# Patient Record
Sex: MALE | Race: WHITE | NOT HISPANIC OR LATINO
[De-identification: names, ages, dates, MRNs, and addresses within clinical notes are randomized per-mention and may not be internally consistent; named-entity substitution may affect disease eponyms.]

---

## 2022-01-01 ENCOUNTER — TRANSCRIPTION ENCOUNTER (OUTPATIENT)
Age: 87
End: 2022-01-01

## 2022-01-01 ENCOUNTER — INPATIENT (INPATIENT)
Facility: HOSPITAL | Age: 87
LOS: 2 days | DRG: 951 | End: 2022-10-02
Attending: STUDENT IN AN ORGANIZED HEALTH CARE EDUCATION/TRAINING PROGRAM | Admitting: HOSPITALIST
Payer: MEDICARE

## 2022-01-01 ENCOUNTER — APPOINTMENT (OUTPATIENT)
Dept: UROLOGY | Facility: CLINIC | Age: 87
End: 2022-01-01

## 2022-01-01 ENCOUNTER — INPATIENT (INPATIENT)
Facility: HOSPITAL | Age: 87
LOS: 7 days | Discharge: NURSING, REHAB OR LONG-TERM CARE | DRG: 871 | End: 2022-08-25
Attending: INTERNAL MEDICINE
Payer: MEDICARE

## 2022-01-01 ENCOUNTER — EMERGENCY (EMERGENCY)
Facility: HOSPITAL | Age: 87
LOS: 1 days | Discharge: HOME | End: 2022-01-01
Attending: EMERGENCY MEDICINE | Admitting: EMERGENCY MEDICINE
Payer: MEDICARE

## 2022-01-01 ENCOUNTER — INPATIENT (INPATIENT)
Facility: HOSPITAL | Age: 87
LOS: 1 days | Discharge: NURSING, REHAB OR LONG-TERM CARE | DRG: 378 | End: 2022-09-12
Attending: INTERNAL MEDICINE | Admitting: INTERNAL MEDICINE
Payer: MEDICARE

## 2022-01-01 VITALS
OXYGEN SATURATION: 98 % | HEART RATE: 80 BPM | TEMPERATURE: 98.3 F | SYSTOLIC BLOOD PRESSURE: 97 MMHG | DIASTOLIC BLOOD PRESSURE: 66 MMHG

## 2022-01-01 VITALS
TEMPERATURE: 98 F | OXYGEN SATURATION: 97 % | HEIGHT: 71 IN | RESPIRATION RATE: 16 BRPM | HEART RATE: 95 BPM | DIASTOLIC BLOOD PRESSURE: 60 MMHG | SYSTOLIC BLOOD PRESSURE: 108 MMHG

## 2022-01-01 VITALS
DIASTOLIC BLOOD PRESSURE: 61 MMHG | TEMPERATURE: 98 F | RESPIRATION RATE: 18 BRPM | SYSTOLIC BLOOD PRESSURE: 107 MMHG | HEART RATE: 83 BPM | OXYGEN SATURATION: 97 %

## 2022-01-01 VITALS
SYSTOLIC BLOOD PRESSURE: 113 MMHG | TEMPERATURE: 98 F | RESPIRATION RATE: 18 BRPM | DIASTOLIC BLOOD PRESSURE: 55 MMHG | HEIGHT: 71 IN | HEART RATE: 73 BPM | OXYGEN SATURATION: 97 % | WEIGHT: 199.96 LBS

## 2022-01-01 VITALS
DIASTOLIC BLOOD PRESSURE: 64 MMHG | OXYGEN SATURATION: 96 % | HEART RATE: 93 BPM | RESPIRATION RATE: 22 BRPM | WEIGHT: 149.91 LBS | SYSTOLIC BLOOD PRESSURE: 101 MMHG | TEMPERATURE: 98 F

## 2022-01-01 VITALS
SYSTOLIC BLOOD PRESSURE: 127 MMHG | DIASTOLIC BLOOD PRESSURE: 69 MMHG | HEART RATE: 85 BPM | RESPIRATION RATE: 17 BRPM | TEMPERATURE: 98 F | OXYGEN SATURATION: 97 %

## 2022-01-01 VITALS
RESPIRATION RATE: 18 BRPM | HEIGHT: 71 IN | SYSTOLIC BLOOD PRESSURE: 83 MMHG | OXYGEN SATURATION: 85 % | WEIGHT: 149.91 LBS | TEMPERATURE: 98 F | HEART RATE: 80 BPM | DIASTOLIC BLOOD PRESSURE: 52 MMHG

## 2022-01-01 VITALS
DIASTOLIC BLOOD PRESSURE: 44 MMHG | OXYGEN SATURATION: 96 % | SYSTOLIC BLOOD PRESSURE: 70 MMHG | HEART RATE: 67 BPM | TEMPERATURE: 99 F | RESPIRATION RATE: 16 BRPM

## 2022-01-01 VITALS
SYSTOLIC BLOOD PRESSURE: 111 MMHG | RESPIRATION RATE: 18 BRPM | DIASTOLIC BLOOD PRESSURE: 67 MMHG | OXYGEN SATURATION: 95 % | TEMPERATURE: 98 F | HEART RATE: 65 BPM

## 2022-01-01 DIAGNOSIS — Z66 DO NOT RESUSCITATE: ICD-10-CM

## 2022-01-01 DIAGNOSIS — N18.9 CHRONIC KIDNEY DISEASE, UNSPECIFIED: ICD-10-CM

## 2022-01-01 DIAGNOSIS — J98.11 ATELECTASIS: ICD-10-CM

## 2022-01-01 DIAGNOSIS — Z51.5 ENCOUNTER FOR PALLIATIVE CARE: ICD-10-CM

## 2022-01-01 DIAGNOSIS — E78.5 HYPERLIPIDEMIA, UNSPECIFIED: ICD-10-CM

## 2022-01-01 DIAGNOSIS — I42.5 OTHER RESTRICTIVE CARDIOMYOPATHY: ICD-10-CM

## 2022-01-01 DIAGNOSIS — G93.49 OTHER ENCEPHALOPATHY: ICD-10-CM

## 2022-01-01 DIAGNOSIS — F03.90 UNSPECIFIED DEMENTIA WITHOUT BEHAVIORAL DISTURBANCE: ICD-10-CM

## 2022-01-01 DIAGNOSIS — F32.9 MAJOR DEPRESSIVE DISORDER, SINGLE EPISODE, UNSPECIFIED: ICD-10-CM

## 2022-01-01 DIAGNOSIS — R52 PAIN, UNSPECIFIED: ICD-10-CM

## 2022-01-01 DIAGNOSIS — I50.9 HEART FAILURE, UNSPECIFIED: ICD-10-CM

## 2022-01-01 DIAGNOSIS — N40.1 BENIGN PROSTATIC HYPERPLASIA WITH LOWER URINARY TRACT SYMPTOMS: ICD-10-CM

## 2022-01-01 DIAGNOSIS — H91.90 UNSPECIFIED HEARING LOSS, UNSPECIFIED EAR: ICD-10-CM

## 2022-01-01 DIAGNOSIS — Z29.9 ENCOUNTER FOR PROPHYLACTIC MEASURES, UNSPECIFIED: ICD-10-CM

## 2022-01-01 DIAGNOSIS — Z20.822 CONTACT WITH AND (SUSPECTED) EXPOSURE TO COVID-19: ICD-10-CM

## 2022-01-01 DIAGNOSIS — I13.0 HYPERTENSIVE HEART AND CHRONIC KIDNEY DISEASE WITH HEART FAILURE AND STAGE 1 THROUGH STAGE 4 CHRONIC KIDNEY DISEASE, OR UNSPECIFIED CHRONIC KIDNEY DISEASE: ICD-10-CM

## 2022-01-01 DIAGNOSIS — I10 ESSENTIAL (PRIMARY) HYPERTENSION: ICD-10-CM

## 2022-01-01 DIAGNOSIS — I48.20 CHRONIC ATRIAL FIBRILLATION, UNSPECIFIED: ICD-10-CM

## 2022-01-01 DIAGNOSIS — I48.91 UNSPECIFIED ATRIAL FIBRILLATION: ICD-10-CM

## 2022-01-01 DIAGNOSIS — K92.2 GASTROINTESTINAL HEMORRHAGE, UNSPECIFIED: ICD-10-CM

## 2022-01-01 DIAGNOSIS — G93.40 ENCEPHALOPATHY, UNSPECIFIED: ICD-10-CM

## 2022-01-01 DIAGNOSIS — A41.9 SEPSIS, UNSPECIFIED ORGANISM: ICD-10-CM

## 2022-01-01 DIAGNOSIS — R45.1 RESTLESSNESS AND AGITATION: ICD-10-CM

## 2022-01-01 DIAGNOSIS — Z79.01 LONG TERM (CURRENT) USE OF ANTICOAGULANTS: ICD-10-CM

## 2022-01-01 DIAGNOSIS — R79.89 OTHER SPECIFIED ABNORMAL FINDINGS OF BLOOD CHEMISTRY: ICD-10-CM

## 2022-01-01 DIAGNOSIS — Z71.89 OTHER SPECIFIED COUNSELING: ICD-10-CM

## 2022-01-01 DIAGNOSIS — F03.91 UNSPECIFIED DEMENTIA WITH BEHAVIORAL DISTURBANCE: ICD-10-CM

## 2022-01-01 DIAGNOSIS — N40.0 BENIGN PROSTATIC HYPERPLASIA WITHOUT LOWER URINARY TRACT SYMPTOMS: ICD-10-CM

## 2022-01-01 DIAGNOSIS — Z86.73 PERSONAL HISTORY OF TRANSIENT ISCHEMIC ATTACK (TIA), AND CEREBRAL INFARCTION WITHOUT RESIDUAL DEFICITS: ICD-10-CM

## 2022-01-01 DIAGNOSIS — D64.9 ANEMIA, UNSPECIFIED: ICD-10-CM

## 2022-01-01 DIAGNOSIS — J69.0 PNEUMONITIS DUE TO INHALATION OF FOOD AND VOMIT: ICD-10-CM

## 2022-01-01 DIAGNOSIS — G93.41 METABOLIC ENCEPHALOPATHY: ICD-10-CM

## 2022-01-01 DIAGNOSIS — I50.22 CHRONIC SYSTOLIC (CONGESTIVE) HEART FAILURE: ICD-10-CM

## 2022-01-01 DIAGNOSIS — I50.30 UNSPECIFIED DIASTOLIC (CONGESTIVE) HEART FAILURE: ICD-10-CM

## 2022-01-01 DIAGNOSIS — L89.95 PRESSURE ULCER OF UNSPECIFIED SITE, UNSTAGEABLE: ICD-10-CM

## 2022-01-01 DIAGNOSIS — E87.2 ACIDOSIS: ICD-10-CM

## 2022-01-01 DIAGNOSIS — I95.9 HYPOTENSION, UNSPECIFIED: ICD-10-CM

## 2022-01-01 DIAGNOSIS — N17.9 ACUTE KIDNEY FAILURE, UNSPECIFIED: ICD-10-CM

## 2022-01-01 DIAGNOSIS — Z53.29 PROCEDURE AND TREATMENT NOT CARRIED OUT BECAUSE OF PATIENT'S DECISION FOR OTHER REASONS: ICD-10-CM

## 2022-01-01 DIAGNOSIS — N39.0 URINARY TRACT INFECTION, SITE NOT SPECIFIED: ICD-10-CM

## 2022-01-01 DIAGNOSIS — R41.82 ALTERED MENTAL STATUS, UNSPECIFIED: ICD-10-CM

## 2022-01-01 DIAGNOSIS — E87.1 HYPO-OSMOLALITY AND HYPONATREMIA: ICD-10-CM

## 2022-01-01 DIAGNOSIS — E86.1 HYPOVOLEMIA: ICD-10-CM

## 2022-01-01 DIAGNOSIS — L89.616 PRESSURE-INDUCED DEEP TISSUE DAMAGE OF RIGHT HEEL: ICD-10-CM

## 2022-01-01 DIAGNOSIS — R33.9 RETENTION OF URINE, UNSPECIFIED: ICD-10-CM

## 2022-01-01 DIAGNOSIS — Z85.51 PERSONAL HISTORY OF MALIGNANT NEOPLASM OF BLADDER: ICD-10-CM

## 2022-01-01 DIAGNOSIS — R13.10 DYSPHAGIA, UNSPECIFIED: ICD-10-CM

## 2022-01-01 DIAGNOSIS — R11.2 NAUSEA WITH VOMITING, UNSPECIFIED: ICD-10-CM

## 2022-01-01 DIAGNOSIS — R60.0 LOCALIZED EDEMA: ICD-10-CM

## 2022-01-01 DIAGNOSIS — I44.0 ATRIOVENTRICULAR BLOCK, FIRST DEGREE: ICD-10-CM

## 2022-01-01 DIAGNOSIS — S31.000A UNSPECIFIED OPEN WOUND OF LOWER BACK AND PELVIS WITHOUT PENETRATION INTO RETROPERITONEUM, INITIAL ENCOUNTER: ICD-10-CM

## 2022-01-01 DIAGNOSIS — R53.81 OTHER MALAISE: ICD-10-CM

## 2022-01-01 DIAGNOSIS — R63.8 OTHER SYMPTOMS AND SIGNS CONCERNING FOOD AND FLUID INTAKE: ICD-10-CM

## 2022-01-01 DIAGNOSIS — L03.113 CELLULITIS OF RIGHT UPPER LIMB: ICD-10-CM

## 2022-01-01 DIAGNOSIS — R33.8 OTHER RETENTION OF URINE: ICD-10-CM

## 2022-01-01 DIAGNOSIS — R53.2 FUNCTIONAL QUADRIPLEGIA: ICD-10-CM

## 2022-01-01 DIAGNOSIS — Q61.02 CONGENITAL MULTIPLE RENAL CYSTS: ICD-10-CM

## 2022-01-01 DIAGNOSIS — L89.156 PRESSURE-INDUCED DEEP TISSUE DAMAGE OF SACRAL REGION: ICD-10-CM

## 2022-01-01 DIAGNOSIS — F03.90 UNSPECIFIED DEMENTIA, UNSPECIFIED SEVERITY, WITHOUT BEHAVIORAL DISTURBANCE, PSYCHOTIC DISTURBANCE, MOOD DISTURBANCE, AND ANXIETY: ICD-10-CM

## 2022-01-01 DIAGNOSIS — L89.626 PRESSURE-INDUCED DEEP TISSUE DAMAGE OF LEFT HEEL: ICD-10-CM

## 2022-01-01 DIAGNOSIS — N17.0 ACUTE KIDNEY FAILURE WITH TUBULAR NECROSIS: ICD-10-CM

## 2022-01-01 LAB
ALBUMIN SERPL ELPH-MCNC: 1.6 G/DL — LOW (ref 3.3–5)
ALBUMIN SERPL ELPH-MCNC: 1.9 G/DL — LOW (ref 3.3–5)
ALBUMIN SERPL ELPH-MCNC: 2 G/DL — LOW (ref 3.3–5)
ALBUMIN SERPL ELPH-MCNC: 2.1 G/DL — LOW (ref 3.3–5)
ALBUMIN SERPL ELPH-MCNC: 2.2 G/DL — LOW (ref 3.3–5)
ALBUMIN SERPL ELPH-MCNC: 2.3 G/DL — LOW (ref 3.3–5)
ALBUMIN SERPL ELPH-MCNC: 2.4 G/DL — LOW (ref 3.3–5)
ALBUMIN SERPL ELPH-MCNC: 2.6 G/DL — LOW (ref 3.3–5)
ALBUMIN SERPL ELPH-MCNC: 2.7 G/DL — LOW (ref 3.3–5)
ALBUMIN SERPL ELPH-MCNC: 3 G/DL — LOW (ref 3.3–5)
ALP SERPL-CCNC: 29 U/L — LOW (ref 40–120)
ALP SERPL-CCNC: 30 U/L — LOW (ref 40–120)
ALP SERPL-CCNC: 33 U/L — LOW (ref 40–120)
ALP SERPL-CCNC: 34 U/L — LOW (ref 40–120)
ALP SERPL-CCNC: 34 U/L — LOW (ref 40–120)
ALP SERPL-CCNC: 35 U/L — LOW (ref 40–120)
ALP SERPL-CCNC: 36 U/L — LOW (ref 40–120)
ALP SERPL-CCNC: 44 U/L — SIGNIFICANT CHANGE UP (ref 40–120)
ALP SERPL-CCNC: 45 U/L — SIGNIFICANT CHANGE UP (ref 40–120)
ALP SERPL-CCNC: 49 U/L — SIGNIFICANT CHANGE UP (ref 40–120)
ALP SERPL-CCNC: 53 U/L — SIGNIFICANT CHANGE UP (ref 40–120)
ALP SERPL-CCNC: 58 U/L — SIGNIFICANT CHANGE UP (ref 40–120)
ALP SERPL-CCNC: 67 U/L — SIGNIFICANT CHANGE UP (ref 40–120)
ALP SERPL-CCNC: 82 U/L — SIGNIFICANT CHANGE UP (ref 40–120)
ALT FLD-CCNC: 23 U/L — SIGNIFICANT CHANGE UP (ref 10–45)
ALT FLD-CCNC: 24 U/L — SIGNIFICANT CHANGE UP (ref 10–45)
ALT FLD-CCNC: 25 U/L — SIGNIFICANT CHANGE UP (ref 10–45)
ALT FLD-CCNC: 27 U/L — SIGNIFICANT CHANGE UP (ref 10–45)
ALT FLD-CCNC: 28 U/L — SIGNIFICANT CHANGE UP (ref 10–45)
ALT FLD-CCNC: 29 U/L — SIGNIFICANT CHANGE UP (ref 10–45)
ALT FLD-CCNC: 37 U/L — SIGNIFICANT CHANGE UP (ref 10–45)
ALT FLD-CCNC: 38 U/L — SIGNIFICANT CHANGE UP (ref 10–45)
ALT FLD-CCNC: 46 U/L — HIGH (ref 10–45)
ANION GAP SERPL CALC-SCNC: 10 MMOL/L — SIGNIFICANT CHANGE UP (ref 5–17)
ANION GAP SERPL CALC-SCNC: 11 MMOL/L — SIGNIFICANT CHANGE UP (ref 5–17)
ANION GAP SERPL CALC-SCNC: 11 MMOL/L — SIGNIFICANT CHANGE UP (ref 5–17)
ANION GAP SERPL CALC-SCNC: 12 MMOL/L — SIGNIFICANT CHANGE UP (ref 5–17)
ANION GAP SERPL CALC-SCNC: 13 MMOL/L — SIGNIFICANT CHANGE UP (ref 5–17)
ANION GAP SERPL CALC-SCNC: 14 MMOL/L — SIGNIFICANT CHANGE UP (ref 5–17)
ANION GAP SERPL CALC-SCNC: 14 MMOL/L — SIGNIFICANT CHANGE UP (ref 5–17)
ANION GAP SERPL CALC-SCNC: 15 MMOL/L — SIGNIFICANT CHANGE UP (ref 5–17)
ANION GAP SERPL CALC-SCNC: 15 MMOL/L — SIGNIFICANT CHANGE UP (ref 5–17)
ANION GAP SERPL CALC-SCNC: 18 MMOL/L — HIGH (ref 5–17)
ANION GAP SERPL CALC-SCNC: 18 MMOL/L — HIGH (ref 5–17)
ANION GAP SERPL CALC-SCNC: 19 MMOL/L — HIGH (ref 5–17)
ANISOCYTOSIS BLD QL: SIGNIFICANT CHANGE UP
ANISOCYTOSIS BLD QL: SLIGHT — SIGNIFICANT CHANGE UP
APPEARANCE UR: CLEAR — SIGNIFICANT CHANGE UP
APTT BLD: 24.3 SEC — LOW (ref 27.5–35.5)
APTT BLD: 27.5 SEC — SIGNIFICANT CHANGE UP (ref 27.5–35.5)
APTT BLD: 28.6 SEC — SIGNIFICANT CHANGE UP (ref 27.5–35.5)
APTT BLD: 31.3 SEC — SIGNIFICANT CHANGE UP (ref 27.5–35.5)
AST SERPL-CCNC: 37 U/L — SIGNIFICANT CHANGE UP (ref 10–40)
AST SERPL-CCNC: 41 U/L — HIGH (ref 10–40)
AST SERPL-CCNC: 48 U/L — HIGH (ref 10–40)
AST SERPL-CCNC: 48 U/L — HIGH (ref 10–40)
AST SERPL-CCNC: 53 U/L — HIGH (ref 10–40)
AST SERPL-CCNC: 53 U/L — HIGH (ref 10–40)
AST SERPL-CCNC: 56 U/L — HIGH (ref 10–40)
AST SERPL-CCNC: 57 U/L — HIGH (ref 10–40)
AST SERPL-CCNC: 60 U/L — HIGH (ref 10–40)
AST SERPL-CCNC: 60 U/L — HIGH (ref 10–40)
AST SERPL-CCNC: 65 U/L — HIGH (ref 10–40)
AST SERPL-CCNC: 70 U/L — HIGH (ref 10–40)
AST SERPL-CCNC: 75 U/L — HIGH (ref 10–40)
AST SERPL-CCNC: 78 U/L — HIGH (ref 10–40)
BACTERIA # UR AUTO: PRESENT /HPF
BASE EXCESS BLDV CALC-SCNC: -1.1 MMOL/L — SIGNIFICANT CHANGE UP (ref -2–3)
BASE EXCESS BLDV CALC-SCNC: -3.3 MMOL/L — LOW (ref -2–3)
BASE EXCESS BLDV CALC-SCNC: -8.8 MMOL/L — LOW (ref -2–3)
BASE EXCESS BLDV CALC-SCNC: -9.6 MMOL/L — LOW (ref -2–3)
BASOPHILS # BLD AUTO: 0 K/UL — SIGNIFICANT CHANGE UP (ref 0–0.2)
BASOPHILS # BLD AUTO: 0.01 K/UL — SIGNIFICANT CHANGE UP (ref 0–0.2)
BASOPHILS # BLD AUTO: 0.02 K/UL — SIGNIFICANT CHANGE UP (ref 0–0.2)
BASOPHILS NFR BLD AUTO: 0 % — SIGNIFICANT CHANGE UP (ref 0–2)
BASOPHILS NFR BLD AUTO: 0.1 % — SIGNIFICANT CHANGE UP (ref 0–2)
BASOPHILS NFR BLD AUTO: 0.2 % — SIGNIFICANT CHANGE UP (ref 0–2)
BASOPHILS NFR BLD AUTO: 0.2 % — SIGNIFICANT CHANGE UP (ref 0–2)
BILIRUB SERPL-MCNC: 0.4 MG/DL — SIGNIFICANT CHANGE UP (ref 0.2–1.2)
BILIRUB SERPL-MCNC: 0.4 MG/DL — SIGNIFICANT CHANGE UP (ref 0.2–1.2)
BILIRUB SERPL-MCNC: 0.5 MG/DL — SIGNIFICANT CHANGE UP (ref 0.2–1.2)
BILIRUB SERPL-MCNC: 0.6 MG/DL — SIGNIFICANT CHANGE UP (ref 0.2–1.2)
BILIRUB SERPL-MCNC: 0.8 MG/DL — SIGNIFICANT CHANGE UP (ref 0.2–1.2)
BILIRUB SERPL-MCNC: 0.9 MG/DL — SIGNIFICANT CHANGE UP (ref 0.2–1.2)
BILIRUB SERPL-MCNC: 1.2 MG/DL — SIGNIFICANT CHANGE UP (ref 0.2–1.2)
BILIRUB SERPL-MCNC: 1.4 MG/DL — HIGH (ref 0.2–1.2)
BILIRUB UR-MCNC: NEGATIVE — SIGNIFICANT CHANGE UP
BLD GP AB SCN SERPL QL: NEGATIVE — SIGNIFICANT CHANGE UP
BUN SERPL-MCNC: 100 MG/DL — HIGH (ref 7–23)
BUN SERPL-MCNC: 102 MG/DL — HIGH (ref 7–23)
BUN SERPL-MCNC: 103 MG/DL — HIGH (ref 7–23)
BUN SERPL-MCNC: 103 MG/DL — HIGH (ref 7–23)
BUN SERPL-MCNC: 117 MG/DL — HIGH (ref 7–23)
BUN SERPL-MCNC: 125 MG/DL — HIGH (ref 7–23)
BUN SERPL-MCNC: 62 MG/DL — HIGH (ref 7–23)
BUN SERPL-MCNC: 75 MG/DL — HIGH (ref 7–23)
BUN SERPL-MCNC: 83 MG/DL — HIGH (ref 7–23)
BUN SERPL-MCNC: 84 MG/DL — HIGH (ref 7–23)
BUN SERPL-MCNC: 94 MG/DL — HIGH (ref 7–23)
BUN SERPL-MCNC: 96 MG/DL — HIGH (ref 7–23)
BUN SERPL-MCNC: 96 MG/DL — HIGH (ref 7–23)
BUN SERPL-MCNC: 98 MG/DL — HIGH (ref 7–23)
BUN SERPL-MCNC: 99 MG/DL — HIGH (ref 7–23)
BURR CELLS BLD QL SMEAR: PRESENT — SIGNIFICANT CHANGE UP
BURR CELLS BLD QL SMEAR: SLIGHT — SIGNIFICANT CHANGE UP
CA-I SERPL-SCNC: 1.14 MMOL/L — LOW (ref 1.15–1.33)
CA-I SERPL-SCNC: 1.17 MMOL/L — SIGNIFICANT CHANGE UP (ref 1.15–1.33)
CA-I SERPL-SCNC: 1.35 MMOL/L — HIGH (ref 1.15–1.33)
CA-I SERPL-SCNC: 1.43 MMOL/L — HIGH (ref 1.15–1.33)
CALCIUM SERPL-MCNC: 10.5 MG/DL — SIGNIFICANT CHANGE UP (ref 8.4–10.5)
CALCIUM SERPL-MCNC: 7.6 MG/DL — LOW (ref 8.4–10.5)
CALCIUM SERPL-MCNC: 8.3 MG/DL — LOW (ref 8.4–10.5)
CALCIUM SERPL-MCNC: 8.5 MG/DL — SIGNIFICANT CHANGE UP (ref 8.4–10.5)
CALCIUM SERPL-MCNC: 8.6 MG/DL — SIGNIFICANT CHANGE UP (ref 8.4–10.5)
CALCIUM SERPL-MCNC: 8.7 MG/DL — SIGNIFICANT CHANGE UP (ref 8.4–10.5)
CALCIUM SERPL-MCNC: 8.8 MG/DL — SIGNIFICANT CHANGE UP (ref 8.4–10.5)
CALCIUM SERPL-MCNC: 8.9 MG/DL — SIGNIFICANT CHANGE UP (ref 8.4–10.5)
CALCIUM SERPL-MCNC: 9 MG/DL — SIGNIFICANT CHANGE UP (ref 8.4–10.5)
CALCIUM SERPL-MCNC: 9.1 MG/DL — SIGNIFICANT CHANGE UP (ref 8.4–10.5)
CALCIUM SERPL-MCNC: 9.2 MG/DL — SIGNIFICANT CHANGE UP (ref 8.4–10.5)
CALCIUM SERPL-MCNC: 9.4 MG/DL — SIGNIFICANT CHANGE UP (ref 8.4–10.5)
CALCIUM SERPL-MCNC: 9.4 MG/DL — SIGNIFICANT CHANGE UP (ref 8.4–10.5)
CHLORIDE SERPL-SCNC: 100 MMOL/L — SIGNIFICANT CHANGE UP (ref 96–108)
CHLORIDE SERPL-SCNC: 101 MMOL/L — SIGNIFICANT CHANGE UP (ref 96–108)
CHLORIDE SERPL-SCNC: 102 MMOL/L — SIGNIFICANT CHANGE UP (ref 96–108)
CHLORIDE SERPL-SCNC: 103 MMOL/L — SIGNIFICANT CHANGE UP (ref 96–108)
CHLORIDE SERPL-SCNC: 104 MMOL/L — SIGNIFICANT CHANGE UP (ref 96–108)
CHLORIDE SERPL-SCNC: 108 MMOL/L — SIGNIFICANT CHANGE UP (ref 96–108)
CHLORIDE SERPL-SCNC: 108 MMOL/L — SIGNIFICANT CHANGE UP (ref 96–108)
CHLORIDE SERPL-SCNC: 109 MMOL/L — HIGH (ref 96–108)
CHLORIDE SERPL-SCNC: 111 MMOL/L — HIGH (ref 96–108)
CHLORIDE SERPL-SCNC: 111 MMOL/L — HIGH (ref 96–108)
CHLORIDE SERPL-SCNC: 112 MMOL/L — HIGH (ref 96–108)
CHLORIDE SERPL-SCNC: 113 MMOL/L — HIGH (ref 96–108)
CHLORIDE SERPL-SCNC: 117 MMOL/L — HIGH (ref 96–108)
CHLORIDE SERPL-SCNC: 122 MMOL/L — HIGH (ref 96–108)
CK MB CFR SERPL CALC: 4 NG/ML — SIGNIFICANT CHANGE UP (ref 0–6.7)
CK MB CFR SERPL CALC: 5 NG/ML — SIGNIFICANT CHANGE UP (ref 0–6.7)
CK SERPL-CCNC: 265 U/L — HIGH (ref 30–200)
CK SERPL-CCNC: 379 U/L — HIGH (ref 30–200)
CO2 BLDV-SCNC: 15.7 MMOL/L — LOW (ref 22–26)
CO2 BLDV-SCNC: 16.5 MMOL/L — LOW (ref 22–26)
CO2 BLDV-SCNC: 20.8 MMOL/L — LOW (ref 22–26)
CO2 BLDV-SCNC: 23.6 MMOL/L — SIGNIFICANT CHANGE UP (ref 22–26)
CO2 SERPL-SCNC: 13 MMOL/L — LOW (ref 22–31)
CO2 SERPL-SCNC: 13 MMOL/L — LOW (ref 22–31)
CO2 SERPL-SCNC: 15 MMOL/L — LOW (ref 22–31)
CO2 SERPL-SCNC: 15 MMOL/L — LOW (ref 22–31)
CO2 SERPL-SCNC: 16 MMOL/L — LOW (ref 22–31)
CO2 SERPL-SCNC: 16 MMOL/L — LOW (ref 22–31)
CO2 SERPL-SCNC: 18 MMOL/L — LOW (ref 22–31)
CO2 SERPL-SCNC: 18 MMOL/L — LOW (ref 22–31)
CO2 SERPL-SCNC: 19 MMOL/L — LOW (ref 22–31)
CO2 SERPL-SCNC: 19 MMOL/L — LOW (ref 22–31)
CO2 SERPL-SCNC: 20 MMOL/L — LOW (ref 22–31)
CO2 SERPL-SCNC: 21 MMOL/L — LOW (ref 22–31)
CO2 SERPL-SCNC: 22 MMOL/L — SIGNIFICANT CHANGE UP (ref 22–31)
CO2 SERPL-SCNC: 25 MMOL/L — SIGNIFICANT CHANGE UP (ref 22–31)
COLOR SPEC: YELLOW — SIGNIFICANT CHANGE UP
CREAT ?TM UR-MCNC: 62 MG/DL — SIGNIFICANT CHANGE UP
CREAT ?TM UR-MCNC: 67 MG/DL — SIGNIFICANT CHANGE UP
CREAT SERPL-MCNC: 2.69 MG/DL — HIGH (ref 0.5–1.3)
CREAT SERPL-MCNC: 3.22 MG/DL — HIGH (ref 0.5–1.3)
CREAT SERPL-MCNC: 3.59 MG/DL — HIGH (ref 0.5–1.3)
CREAT SERPL-MCNC: 3.63 MG/DL — HIGH (ref 0.5–1.3)
CREAT SERPL-MCNC: 3.73 MG/DL — HIGH (ref 0.5–1.3)
CREAT SERPL-MCNC: 3.8 MG/DL — HIGH (ref 0.5–1.3)
CREAT SERPL-MCNC: 3.85 MG/DL — HIGH (ref 0.5–1.3)
CREAT SERPL-MCNC: 3.88 MG/DL — HIGH (ref 0.5–1.3)
CREAT SERPL-MCNC: 3.91 MG/DL — HIGH (ref 0.5–1.3)
CREAT SERPL-MCNC: 3.92 MG/DL — HIGH (ref 0.5–1.3)
CREAT SERPL-MCNC: 4.01 MG/DL — HIGH (ref 0.5–1.3)
CREAT SERPL-MCNC: 4.03 MG/DL — HIGH (ref 0.5–1.3)
CREAT SERPL-MCNC: 4.05 MG/DL — HIGH (ref 0.5–1.3)
CREAT SERPL-MCNC: 4.13 MG/DL — HIGH (ref 0.5–1.3)
CREAT SERPL-MCNC: 4.15 MG/DL — HIGH (ref 0.5–1.3)
CREAT SERPL-MCNC: 4.26 MG/DL — HIGH (ref 0.5–1.3)
CREAT SERPL-MCNC: 4.27 MG/DL — HIGH (ref 0.5–1.3)
CREAT SERPL-MCNC: 4.41 MG/DL — HIGH (ref 0.5–1.3)
CREAT SERPL-MCNC: 5.99 MG/DL — HIGH (ref 0.5–1.3)
CREAT SERPL-MCNC: 6.4 MG/DL — HIGH (ref 0.5–1.3)
CULTURE RESULTS: NO GROWTH — SIGNIFICANT CHANGE UP
CULTURE RESULTS: SIGNIFICANT CHANGE UP
CULTURE RESULTS: SIGNIFICANT CHANGE UP
DIFF PNL FLD: ABNORMAL
EGFR: 12 ML/MIN/1.73M2 — LOW
EGFR: 13 ML/MIN/1.73M2 — LOW
EGFR: 14 ML/MIN/1.73M2 — LOW
EGFR: 15 ML/MIN/1.73M2 — LOW
EGFR: 15 ML/MIN/1.73M2 — LOW
EGFR: 17 ML/MIN/1.73M2 — LOW
EGFR: 21 ML/MIN/1.73M2 — LOW
EGFR: 8 ML/MIN/1.73M2 — LOW
EGFR: 8 ML/MIN/1.73M2 — LOW
EOSINOPHIL # BLD AUTO: 0 K/UL — SIGNIFICANT CHANGE UP (ref 0–0.5)
EOSINOPHIL # BLD AUTO: 0.01 K/UL — SIGNIFICANT CHANGE UP (ref 0–0.5)
EOSINOPHIL # BLD AUTO: 0.02 K/UL — SIGNIFICANT CHANGE UP (ref 0–0.5)
EOSINOPHIL # BLD AUTO: 0.06 K/UL — SIGNIFICANT CHANGE UP (ref 0–0.5)
EOSINOPHIL # BLD AUTO: 0.08 K/UL — SIGNIFICANT CHANGE UP (ref 0–0.5)
EOSINOPHIL # BLD AUTO: 0.13 K/UL — SIGNIFICANT CHANGE UP (ref 0–0.5)
EOSINOPHIL # BLD AUTO: 0.15 K/UL — SIGNIFICANT CHANGE UP (ref 0–0.5)
EOSINOPHIL # BLD AUTO: 0.16 K/UL — SIGNIFICANT CHANGE UP (ref 0–0.5)
EOSINOPHIL # BLD AUTO: 0.17 K/UL — SIGNIFICANT CHANGE UP (ref 0–0.5)
EOSINOPHIL # BLD AUTO: 0.22 K/UL — SIGNIFICANT CHANGE UP (ref 0–0.5)
EOSINOPHIL # BLD AUTO: 0.26 K/UL — SIGNIFICANT CHANGE UP (ref 0–0.5)
EOSINOPHIL # BLD AUTO: 0.34 K/UL — SIGNIFICANT CHANGE UP (ref 0–0.5)
EOSINOPHIL NFR BLD AUTO: 0 % — SIGNIFICANT CHANGE UP (ref 0–6)
EOSINOPHIL NFR BLD AUTO: 0.1 % — SIGNIFICANT CHANGE UP (ref 0–6)
EOSINOPHIL NFR BLD AUTO: 0.1 % — SIGNIFICANT CHANGE UP (ref 0–6)
EOSINOPHIL NFR BLD AUTO: 0.6 % — SIGNIFICANT CHANGE UP (ref 0–6)
EOSINOPHIL NFR BLD AUTO: 0.9 % — SIGNIFICANT CHANGE UP (ref 0–6)
EOSINOPHIL NFR BLD AUTO: 1.7 % — SIGNIFICANT CHANGE UP (ref 0–6)
EOSINOPHIL NFR BLD AUTO: 2 % — SIGNIFICANT CHANGE UP (ref 0–6)
EOSINOPHIL NFR BLD AUTO: 2 % — SIGNIFICANT CHANGE UP (ref 0–6)
EOSINOPHIL NFR BLD AUTO: 2.3 % — SIGNIFICANT CHANGE UP (ref 0–6)
EOSINOPHIL NFR BLD AUTO: 2.5 % — SIGNIFICANT CHANGE UP (ref 0–6)
EOSINOPHIL NFR BLD AUTO: 3.6 % — SIGNIFICANT CHANGE UP (ref 0–6)
EOSINOPHIL NFR BLD AUTO: 4.4 % — SIGNIFICANT CHANGE UP (ref 0–6)
EPI CELLS # UR: SIGNIFICANT CHANGE UP /HPF (ref 0–5)
FERRITIN SERPL-MCNC: 916 NG/ML — HIGH (ref 30–400)
FOLATE SERPL-MCNC: 5.8 NG/ML — SIGNIFICANT CHANGE UP
GAS PNL BLDV: 130 MMOL/L — LOW (ref 136–145)
GAS PNL BLDV: 133 MMOL/L — LOW (ref 136–145)
GAS PNL BLDV: 149 MMOL/L — HIGH (ref 136–145)
GAS PNL BLDV: 149 MMOL/L — HIGH (ref 136–145)
GAS PNL BLDV: SIGNIFICANT CHANGE UP
GIANT PLATELETS BLD QL SMEAR: PRESENT — SIGNIFICANT CHANGE UP
GLUCOSE SERPL-MCNC: 105 MG/DL — HIGH (ref 70–99)
GLUCOSE SERPL-MCNC: 106 MG/DL — HIGH (ref 70–99)
GLUCOSE SERPL-MCNC: 112 MG/DL — HIGH (ref 70–99)
GLUCOSE SERPL-MCNC: 119 MG/DL — HIGH (ref 70–99)
GLUCOSE SERPL-MCNC: 121 MG/DL — HIGH (ref 70–99)
GLUCOSE SERPL-MCNC: 124 MG/DL — HIGH (ref 70–99)
GLUCOSE SERPL-MCNC: 132 MG/DL — HIGH (ref 70–99)
GLUCOSE SERPL-MCNC: 140 MG/DL — HIGH (ref 70–99)
GLUCOSE SERPL-MCNC: 81 MG/DL — SIGNIFICANT CHANGE UP (ref 70–99)
GLUCOSE SERPL-MCNC: 84 MG/DL — SIGNIFICANT CHANGE UP (ref 70–99)
GLUCOSE SERPL-MCNC: 85 MG/DL — SIGNIFICANT CHANGE UP (ref 70–99)
GLUCOSE SERPL-MCNC: 87 MG/DL — SIGNIFICANT CHANGE UP (ref 70–99)
GLUCOSE SERPL-MCNC: 91 MG/DL — SIGNIFICANT CHANGE UP (ref 70–99)
GLUCOSE SERPL-MCNC: 92 MG/DL — SIGNIFICANT CHANGE UP (ref 70–99)
GLUCOSE SERPL-MCNC: 94 MG/DL — SIGNIFICANT CHANGE UP (ref 70–99)
GLUCOSE SERPL-MCNC: 94 MG/DL — SIGNIFICANT CHANGE UP (ref 70–99)
GLUCOSE SERPL-MCNC: 97 MG/DL — SIGNIFICANT CHANGE UP (ref 70–99)
GLUCOSE SERPL-MCNC: 99 MG/DL — SIGNIFICANT CHANGE UP (ref 70–99)
GLUCOSE UR QL: NEGATIVE — SIGNIFICANT CHANGE UP
HCO3 BLDV-SCNC: 15 MMOL/L — LOW (ref 22–29)
HCO3 BLDV-SCNC: 16 MMOL/L — LOW (ref 22–29)
HCO3 BLDV-SCNC: 20 MMOL/L — LOW (ref 22–29)
HCO3 BLDV-SCNC: 23 MMOL/L — SIGNIFICANT CHANGE UP (ref 22–29)
HCT VFR BLD CALC: 19 % — CRITICAL LOW (ref 39–50)
HCT VFR BLD CALC: 21.2 % — LOW (ref 39–50)
HCT VFR BLD CALC: 21.8 % — LOW (ref 39–50)
HCT VFR BLD CALC: 22.1 % — LOW (ref 39–50)
HCT VFR BLD CALC: 22.5 % — LOW (ref 39–50)
HCT VFR BLD CALC: 23.3 % — LOW (ref 39–50)
HCT VFR BLD CALC: 23.5 % — LOW (ref 39–50)
HCT VFR BLD CALC: 23.6 % — LOW (ref 39–50)
HCT VFR BLD CALC: 23.8 % — LOW (ref 39–50)
HCT VFR BLD CALC: 24.2 % — LOW (ref 39–50)
HCT VFR BLD CALC: 27.5 % — LOW (ref 39–50)
HCT VFR BLD CALC: 28 % — LOW (ref 39–50)
HCT VFR BLD CALC: 28.1 % — LOW (ref 39–50)
HCT VFR BLD CALC: 28.2 % — LOW (ref 39–50)
HCT VFR BLD CALC: 28.4 % — LOW (ref 39–50)
HCT VFR BLD CALC: 28.9 % — LOW (ref 39–50)
HCT VFR BLD CALC: 29.6 % — LOW (ref 39–50)
HCT VFR BLD CALC: 30.2 % — LOW (ref 39–50)
HGB BLD-MCNC: 5.9 G/DL — CRITICAL LOW (ref 13–17)
HGB BLD-MCNC: 7.2 G/DL — LOW (ref 13–17)
HGB BLD-MCNC: 7.2 G/DL — LOW (ref 13–17)
HGB BLD-MCNC: 7.3 G/DL — LOW (ref 13–17)
HGB BLD-MCNC: 7.6 G/DL — LOW (ref 13–17)
HGB BLD-MCNC: 7.7 G/DL — LOW (ref 13–17)
HGB BLD-MCNC: 7.8 G/DL — LOW (ref 13–17)
HGB BLD-MCNC: 8.6 G/DL — LOW (ref 13–17)
HGB BLD-MCNC: 8.9 G/DL — LOW (ref 13–17)
HGB BLD-MCNC: 9.1 G/DL — LOW (ref 13–17)
HGB BLD-MCNC: 9.2 G/DL — LOW (ref 13–17)
HGB BLD-MCNC: 9.4 G/DL — LOW (ref 13–17)
HGB BLD-MCNC: 9.5 G/DL — LOW (ref 13–17)
HYPOCHROMIA BLD QL: SLIGHT — SIGNIFICANT CHANGE UP
IMM GRANULOCYTES NFR BLD AUTO: 0.4 % — SIGNIFICANT CHANGE UP (ref 0–1.5)
IMM GRANULOCYTES NFR BLD AUTO: 0.5 % — SIGNIFICANT CHANGE UP (ref 0–1.5)
IMM GRANULOCYTES NFR BLD AUTO: 0.9 % — SIGNIFICANT CHANGE UP (ref 0–1.5)
IMM GRANULOCYTES NFR BLD AUTO: 0.9 % — SIGNIFICANT CHANGE UP (ref 0–1.5)
IMM GRANULOCYTES NFR BLD AUTO: 1 % — SIGNIFICANT CHANGE UP (ref 0–1.5)
IMM GRANULOCYTES NFR BLD AUTO: 1 % — SIGNIFICANT CHANGE UP (ref 0–1.5)
IMM GRANULOCYTES NFR BLD AUTO: 1.1 % — HIGH (ref 0–0.9)
IMM GRANULOCYTES NFR BLD AUTO: 1.2 % — SIGNIFICANT CHANGE UP (ref 0–1.5)
IMM GRANULOCYTES NFR BLD AUTO: 1.2 % — SIGNIFICANT CHANGE UP (ref 0–1.5)
IMM GRANULOCYTES NFR BLD AUTO: 1.3 % — SIGNIFICANT CHANGE UP (ref 0–1.5)
IMM GRANULOCYTES NFR BLD AUTO: 1.3 % — SIGNIFICANT CHANGE UP (ref 0–1.5)
IMM GRANULOCYTES NFR BLD AUTO: 1.6 % — HIGH (ref 0–1.5)
IMM GRANULOCYTES NFR BLD AUTO: 1.8 % — HIGH (ref 0–1.5)
INR BLD: 1.18 — HIGH (ref 0.88–1.16)
INR BLD: 1.19 — HIGH (ref 0.88–1.16)
INR BLD: 1.42 — HIGH (ref 0.88–1.16)
INR BLD: 1.43 — HIGH (ref 0.88–1.16)
IRON SATN MFR SERPL: 32 % — SIGNIFICANT CHANGE UP (ref 16–55)
IRON SATN MFR SERPL: 49 UG/DL — SIGNIFICANT CHANGE UP (ref 45–165)
KETONES UR-MCNC: NEGATIVE — SIGNIFICANT CHANGE UP
LACTATE SERPL-SCNC: 1.1 MMOL/L — SIGNIFICANT CHANGE UP (ref 0.5–2)
LACTATE SERPL-SCNC: 1.8 MMOL/L — SIGNIFICANT CHANGE UP (ref 0.5–2)
LACTATE SERPL-SCNC: 2.3 MMOL/L — HIGH (ref 0.5–2)
LEUKOCYTE ESTERASE UR-ACNC: NEGATIVE — SIGNIFICANT CHANGE UP
LIDOCAIN IGE QN: 49 U/L — SIGNIFICANT CHANGE UP (ref 7–60)
LIDOCAIN IGE QN: 54 U/L — SIGNIFICANT CHANGE UP (ref 7–60)
LYMPHOCYTES # BLD AUTO: 0.19 K/UL — LOW (ref 1–3.3)
LYMPHOCYTES # BLD AUTO: 0.62 K/UL — LOW (ref 1–3.3)
LYMPHOCYTES # BLD AUTO: 0.77 K/UL — LOW (ref 1–3.3)
LYMPHOCYTES # BLD AUTO: 1.05 K/UL — SIGNIFICANT CHANGE UP (ref 1–3.3)
LYMPHOCYTES # BLD AUTO: 1.13 K/UL — SIGNIFICANT CHANGE UP (ref 1–3.3)
LYMPHOCYTES # BLD AUTO: 1.19 K/UL — SIGNIFICANT CHANGE UP (ref 1–3.3)
LYMPHOCYTES # BLD AUTO: 1.25 K/UL — SIGNIFICANT CHANGE UP (ref 1–3.3)
LYMPHOCYTES # BLD AUTO: 1.28 K/UL — SIGNIFICANT CHANGE UP (ref 1–3.3)
LYMPHOCYTES # BLD AUTO: 1.33 K/UL — SIGNIFICANT CHANGE UP (ref 1–3.3)
LYMPHOCYTES # BLD AUTO: 1.41 K/UL — SIGNIFICANT CHANGE UP (ref 1–3.3)
LYMPHOCYTES # BLD AUTO: 1.42 K/UL — SIGNIFICANT CHANGE UP (ref 1–3.3)
LYMPHOCYTES # BLD AUTO: 1.46 K/UL — SIGNIFICANT CHANGE UP (ref 1–3.3)
LYMPHOCYTES # BLD AUTO: 1.5 K/UL — SIGNIFICANT CHANGE UP (ref 1–3.3)
LYMPHOCYTES # BLD AUTO: 1.6 K/UL — SIGNIFICANT CHANGE UP (ref 1–3.3)
LYMPHOCYTES # BLD AUTO: 1.7 % — LOW (ref 13–44)
LYMPHOCYTES # BLD AUTO: 1.7 K/UL — SIGNIFICANT CHANGE UP (ref 1–3.3)
LYMPHOCYTES # BLD AUTO: 10.2 % — LOW (ref 13–44)
LYMPHOCYTES # BLD AUTO: 13.8 % — SIGNIFICANT CHANGE UP (ref 13–44)
LYMPHOCYTES # BLD AUTO: 15.4 % — SIGNIFICANT CHANGE UP (ref 13–44)
LYMPHOCYTES # BLD AUTO: 15.7 % — SIGNIFICANT CHANGE UP (ref 13–44)
LYMPHOCYTES # BLD AUTO: 16.8 % — SIGNIFICANT CHANGE UP (ref 13–44)
LYMPHOCYTES # BLD AUTO: 17 % — SIGNIFICANT CHANGE UP (ref 13–44)
LYMPHOCYTES # BLD AUTO: 17.3 % — SIGNIFICANT CHANGE UP (ref 13–44)
LYMPHOCYTES # BLD AUTO: 17.4 % — SIGNIFICANT CHANGE UP (ref 13–44)
LYMPHOCYTES # BLD AUTO: 21.9 % — SIGNIFICANT CHANGE UP (ref 13–44)
LYMPHOCYTES # BLD AUTO: 22.1 % — SIGNIFICANT CHANGE UP (ref 13–44)
LYMPHOCYTES # BLD AUTO: 4.8 % — LOW (ref 13–44)
LYMPHOCYTES # BLD AUTO: 5.2 % — LOW (ref 13–44)
LYMPHOCYTES # BLD AUTO: 8 % — LOW (ref 13–44)
LYMPHOCYTES # BLD AUTO: 8.5 % — LOW (ref 13–44)
MACROCYTES BLD QL: SIGNIFICANT CHANGE UP
MACROCYTES BLD QL: SLIGHT — SIGNIFICANT CHANGE UP
MAGNESIUM SERPL-MCNC: 2.2 MG/DL — SIGNIFICANT CHANGE UP (ref 1.6–2.6)
MAGNESIUM SERPL-MCNC: 2.3 MG/DL — SIGNIFICANT CHANGE UP (ref 1.6–2.6)
MAGNESIUM SERPL-MCNC: 2.4 MG/DL — SIGNIFICANT CHANGE UP (ref 1.6–2.6)
MAGNESIUM SERPL-MCNC: 2.6 MG/DL — SIGNIFICANT CHANGE UP (ref 1.6–2.6)
MAGNESIUM SERPL-MCNC: 2.8 MG/DL — HIGH (ref 1.6–2.6)
MANUAL SMEAR VERIFICATION: SIGNIFICANT CHANGE UP
MANUAL SMEAR VERIFICATION: SIGNIFICANT CHANGE UP
MCHC RBC-ENTMCNC: 30.5 PG — SIGNIFICANT CHANGE UP (ref 27–34)
MCHC RBC-ENTMCNC: 30.6 GM/DL — LOW (ref 32–36)
MCHC RBC-ENTMCNC: 30.6 PG — SIGNIFICANT CHANGE UP (ref 27–34)
MCHC RBC-ENTMCNC: 30.7 PG — SIGNIFICANT CHANGE UP (ref 27–34)
MCHC RBC-ENTMCNC: 30.8 PG — SIGNIFICANT CHANGE UP (ref 27–34)
MCHC RBC-ENTMCNC: 30.9 PG — SIGNIFICANT CHANGE UP (ref 27–34)
MCHC RBC-ENTMCNC: 31 PG — SIGNIFICANT CHANGE UP (ref 27–34)
MCHC RBC-ENTMCNC: 31.1 GM/DL — LOW (ref 32–36)
MCHC RBC-ENTMCNC: 31.1 GM/DL — LOW (ref 32–36)
MCHC RBC-ENTMCNC: 31.1 PG — SIGNIFICANT CHANGE UP (ref 27–34)
MCHC RBC-ENTMCNC: 31.2 PG — SIGNIFICANT CHANGE UP (ref 27–34)
MCHC RBC-ENTMCNC: 31.2 PG — SIGNIFICANT CHANGE UP (ref 27–34)
MCHC RBC-ENTMCNC: 31.4 PG — SIGNIFICANT CHANGE UP (ref 27–34)
MCHC RBC-ENTMCNC: 31.6 PG — SIGNIFICANT CHANGE UP (ref 27–34)
MCHC RBC-ENTMCNC: 31.8 GM/DL — LOW (ref 32–36)
MCHC RBC-ENTMCNC: 31.8 GM/DL — LOW (ref 32–36)
MCHC RBC-ENTMCNC: 31.8 PG — SIGNIFICANT CHANGE UP (ref 27–34)
MCHC RBC-ENTMCNC: 31.9 GM/DL — LOW (ref 32–36)
MCHC RBC-ENTMCNC: 31.9 PG — SIGNIFICANT CHANGE UP (ref 27–34)
MCHC RBC-ENTMCNC: 31.9 PG — SIGNIFICANT CHANGE UP (ref 27–34)
MCHC RBC-ENTMCNC: 32.1 GM/DL — SIGNIFICANT CHANGE UP (ref 32–36)
MCHC RBC-ENTMCNC: 32.1 PG — SIGNIFICANT CHANGE UP (ref 27–34)
MCHC RBC-ENTMCNC: 32.2 GM/DL — SIGNIFICANT CHANGE UP (ref 32–36)
MCHC RBC-ENTMCNC: 32.2 PG — SIGNIFICANT CHANGE UP (ref 27–34)
MCHC RBC-ENTMCNC: 32.2 PG — SIGNIFICANT CHANGE UP (ref 27–34)
MCHC RBC-ENTMCNC: 32.5 GM/DL — SIGNIFICANT CHANGE UP (ref 32–36)
MCHC RBC-ENTMCNC: 32.6 GM/DL — SIGNIFICANT CHANGE UP (ref 32–36)
MCHC RBC-ENTMCNC: 33 PG — SIGNIFICANT CHANGE UP (ref 27–34)
MCHC RBC-ENTMCNC: 33.1 GM/DL — SIGNIFICANT CHANGE UP (ref 32–36)
MCHC RBC-ENTMCNC: 33.1 GM/DL — SIGNIFICANT CHANGE UP (ref 32–36)
MCHC RBC-ENTMCNC: 33.2 GM/DL — SIGNIFICANT CHANGE UP (ref 32–36)
MCHC RBC-ENTMCNC: 33.5 GM/DL — SIGNIFICANT CHANGE UP (ref 32–36)
MCHC RBC-ENTMCNC: 33.8 GM/DL — SIGNIFICANT CHANGE UP (ref 32–36)
MCHC RBC-ENTMCNC: 34 GM/DL — SIGNIFICANT CHANGE UP (ref 32–36)
MCV RBC AUTO: 100.3 FL — HIGH (ref 80–100)
MCV RBC AUTO: 100.4 FL — HIGH (ref 80–100)
MCV RBC AUTO: 101.8 FL — HIGH (ref 80–100)
MCV RBC AUTO: 103.1 FL — HIGH (ref 80–100)
MCV RBC AUTO: 106.1 FL — HIGH (ref 80–100)
MCV RBC AUTO: 91.6 FL — SIGNIFICANT CHANGE UP (ref 80–100)
MCV RBC AUTO: 91.8 FL — SIGNIFICANT CHANGE UP (ref 80–100)
MCV RBC AUTO: 91.8 FL — SIGNIFICANT CHANGE UP (ref 80–100)
MCV RBC AUTO: 92.2 FL — SIGNIFICANT CHANGE UP (ref 80–100)
MCV RBC AUTO: 93.7 FL — SIGNIFICANT CHANGE UP (ref 80–100)
MCV RBC AUTO: 95.9 FL — SIGNIFICANT CHANGE UP (ref 80–100)
MCV RBC AUTO: 96 FL — SIGNIFICANT CHANGE UP (ref 80–100)
MCV RBC AUTO: 96.2 FL — SIGNIFICANT CHANGE UP (ref 80–100)
MCV RBC AUTO: 96.5 FL — SIGNIFICANT CHANGE UP (ref 80–100)
MCV RBC AUTO: 96.8 FL — SIGNIFICANT CHANGE UP (ref 80–100)
MCV RBC AUTO: 96.9 FL — SIGNIFICANT CHANGE UP (ref 80–100)
MCV RBC AUTO: 98 FL — SIGNIFICANT CHANGE UP (ref 80–100)
MCV RBC AUTO: 98.7 FL — SIGNIFICANT CHANGE UP (ref 80–100)
MICROCYTES BLD QL: SLIGHT — SIGNIFICANT CHANGE UP
MONOCYTES # BLD AUTO: 0.38 K/UL — SIGNIFICANT CHANGE UP (ref 0–0.9)
MONOCYTES # BLD AUTO: 0.58 K/UL — SIGNIFICANT CHANGE UP (ref 0–0.9)
MONOCYTES # BLD AUTO: 0.63 K/UL — SIGNIFICANT CHANGE UP (ref 0–0.9)
MONOCYTES # BLD AUTO: 0.67 K/UL — SIGNIFICANT CHANGE UP (ref 0–0.9)
MONOCYTES # BLD AUTO: 0.71 K/UL — SIGNIFICANT CHANGE UP (ref 0–0.9)
MONOCYTES # BLD AUTO: 0.81 K/UL — SIGNIFICANT CHANGE UP (ref 0–0.9)
MONOCYTES # BLD AUTO: 0.83 K/UL — SIGNIFICANT CHANGE UP (ref 0–0.9)
MONOCYTES # BLD AUTO: 0.84 K/UL — SIGNIFICANT CHANGE UP (ref 0–0.9)
MONOCYTES # BLD AUTO: 0.88 K/UL — SIGNIFICANT CHANGE UP (ref 0–0.9)
MONOCYTES # BLD AUTO: 0.88 K/UL — SIGNIFICANT CHANGE UP (ref 0–0.9)
MONOCYTES # BLD AUTO: 0.94 K/UL — HIGH (ref 0–0.9)
MONOCYTES # BLD AUTO: 0.96 K/UL — HIGH (ref 0–0.9)
MONOCYTES # BLD AUTO: 1.06 K/UL — HIGH (ref 0–0.9)
MONOCYTES # BLD AUTO: 1.06 K/UL — HIGH (ref 0–0.9)
MONOCYTES # BLD AUTO: 1.43 K/UL — HIGH (ref 0–0.9)
MONOCYTES NFR BLD AUTO: 10.3 % — SIGNIFICANT CHANGE UP (ref 2–14)
MONOCYTES NFR BLD AUTO: 10.8 % — SIGNIFICANT CHANGE UP (ref 2–14)
MONOCYTES NFR BLD AUTO: 10.9 % — SIGNIFICANT CHANGE UP (ref 2–14)
MONOCYTES NFR BLD AUTO: 10.9 % — SIGNIFICANT CHANGE UP (ref 2–14)
MONOCYTES NFR BLD AUTO: 11.3 % — SIGNIFICANT CHANGE UP (ref 2–14)
MONOCYTES NFR BLD AUTO: 11.6 % — SIGNIFICANT CHANGE UP (ref 2–14)
MONOCYTES NFR BLD AUTO: 13.8 % — SIGNIFICANT CHANGE UP (ref 2–14)
MONOCYTES NFR BLD AUTO: 14 % — SIGNIFICANT CHANGE UP (ref 2–14)
MONOCYTES NFR BLD AUTO: 2.6 % — SIGNIFICANT CHANGE UP (ref 2–14)
MONOCYTES NFR BLD AUTO: 5.2 % — SIGNIFICANT CHANGE UP (ref 2–14)
MONOCYTES NFR BLD AUTO: 5.5 % — SIGNIFICANT CHANGE UP (ref 2–14)
MONOCYTES NFR BLD AUTO: 5.9 % — SIGNIFICANT CHANGE UP (ref 2–14)
MONOCYTES NFR BLD AUTO: 6.1 % — SIGNIFICANT CHANGE UP (ref 2–14)
MONOCYTES NFR BLD AUTO: 6.8 % — SIGNIFICANT CHANGE UP (ref 2–14)
MONOCYTES NFR BLD AUTO: 8.8 % — SIGNIFICANT CHANGE UP (ref 2–14)
MYELOCYTES NFR BLD: 0.8 % — HIGH (ref 0–0)
NEUTROPHILS # BLD AUTO: 10.46 K/UL — HIGH (ref 1.8–7.4)
NEUTROPHILS # BLD AUTO: 10.92 K/UL — HIGH (ref 1.8–7.4)
NEUTROPHILS # BLD AUTO: 11.12 K/UL — HIGH (ref 1.8–7.4)
NEUTROPHILS # BLD AUTO: 11.53 K/UL — HIGH (ref 1.8–7.4)
NEUTROPHILS # BLD AUTO: 12.71 K/UL — HIGH (ref 1.8–7.4)
NEUTROPHILS # BLD AUTO: 13.46 K/UL — HIGH (ref 1.8–7.4)
NEUTROPHILS # BLD AUTO: 4.11 K/UL — SIGNIFICANT CHANGE UP (ref 1.8–7.4)
NEUTROPHILS # BLD AUTO: 4.48 K/UL — SIGNIFICANT CHANGE UP (ref 1.8–7.4)
NEUTROPHILS # BLD AUTO: 4.79 K/UL — SIGNIFICANT CHANGE UP (ref 1.8–7.4)
NEUTROPHILS # BLD AUTO: 5.48 K/UL — SIGNIFICANT CHANGE UP (ref 1.8–7.4)
NEUTROPHILS # BLD AUTO: 5.49 K/UL — SIGNIFICANT CHANGE UP (ref 1.8–7.4)
NEUTROPHILS # BLD AUTO: 5.51 K/UL — SIGNIFICANT CHANGE UP (ref 1.8–7.4)
NEUTROPHILS # BLD AUTO: 6.2 K/UL — SIGNIFICANT CHANGE UP (ref 1.8–7.4)
NEUTROPHILS # BLD AUTO: 6.43 K/UL — SIGNIFICANT CHANGE UP (ref 1.8–7.4)
NEUTROPHILS # BLD AUTO: 7.04 K/UL — SIGNIFICANT CHANGE UP (ref 1.8–7.4)
NEUTROPHILS NFR BLD AUTO: 58.4 % — SIGNIFICANT CHANGE UP (ref 43–77)
NEUTROPHILS NFR BLD AUTO: 59.9 % — SIGNIFICANT CHANGE UP (ref 43–77)
NEUTROPHILS NFR BLD AUTO: 66.2 % — SIGNIFICANT CHANGE UP (ref 43–77)
NEUTROPHILS NFR BLD AUTO: 67.9 % — SIGNIFICANT CHANGE UP (ref 43–77)
NEUTROPHILS NFR BLD AUTO: 68.2 % — SIGNIFICANT CHANGE UP (ref 43–77)
NEUTROPHILS NFR BLD AUTO: 71.8 % — SIGNIFICANT CHANGE UP (ref 43–77)
NEUTROPHILS NFR BLD AUTO: 71.9 % — SIGNIFICANT CHANGE UP (ref 43–77)
NEUTROPHILS NFR BLD AUTO: 72.2 % — SIGNIFICANT CHANGE UP (ref 43–77)
NEUTROPHILS NFR BLD AUTO: 75.8 % — SIGNIFICANT CHANGE UP (ref 43–77)
NEUTROPHILS NFR BLD AUTO: 78.8 % — HIGH (ref 43–77)
NEUTROPHILS NFR BLD AUTO: 84 % — HIGH (ref 43–77)
NEUTROPHILS NFR BLD AUTO: 85 % — HIGH (ref 43–77)
NEUTROPHILS NFR BLD AUTO: 88.6 % — HIGH (ref 43–77)
NEUTROPHILS NFR BLD AUTO: 91.4 % — HIGH (ref 43–77)
NEUTROPHILS NFR BLD AUTO: 93.1 % — HIGH (ref 43–77)
NITRITE UR-MCNC: NEGATIVE — SIGNIFICANT CHANGE UP
NRBC # BLD: 0 /100 WBCS — SIGNIFICANT CHANGE UP (ref 0–0)
NRBC # BLD: 1 /100 WBCS — HIGH (ref 0–0)
NRBC # BLD: 1 /100 — HIGH (ref 0–0)
NRBC # BLD: 2 /100 WBCS — HIGH (ref 0–0)
NRBC # BLD: SIGNIFICANT CHANGE UP /100 WBCS (ref 0–0)
NT-PROBNP SERPL-SCNC: 2189 PG/ML — HIGH (ref 0–300)
OSMOLALITY SERPL: 314 MOSM/KG — HIGH (ref 280–301)
OSMOLALITY UR: 370 MOSM/KG — SIGNIFICANT CHANGE UP (ref 300–900)
OVALOCYTES BLD QL SMEAR: SLIGHT — SIGNIFICANT CHANGE UP
OVALOCYTES BLD QL SMEAR: SLIGHT — SIGNIFICANT CHANGE UP
PCO2 BLDV: 28 MMHG — LOW (ref 42–55)
PCO2 BLDV: 29 MMHG — LOW (ref 42–55)
PCO2 BLDV: 30 MMHG — LOW (ref 42–55)
PCO2 BLDV: 34 MMHG — LOW (ref 42–55)
PH BLDV: 7.33 — SIGNIFICANT CHANGE UP (ref 7.32–7.43)
PH BLDV: 7.34 — SIGNIFICANT CHANGE UP (ref 7.32–7.43)
PH BLDV: 7.43 — SIGNIFICANT CHANGE UP (ref 7.32–7.43)
PH BLDV: 7.43 — SIGNIFICANT CHANGE UP (ref 7.32–7.43)
PH UR: 6 — SIGNIFICANT CHANGE UP (ref 5–8)
PHOSPHATE SERPL-MCNC: 3.2 MG/DL — SIGNIFICANT CHANGE UP (ref 2.5–4.5)
PHOSPHATE SERPL-MCNC: 3.2 MG/DL — SIGNIFICANT CHANGE UP (ref 2.5–4.5)
PHOSPHATE SERPL-MCNC: 3.4 MG/DL — SIGNIFICANT CHANGE UP (ref 2.5–4.5)
PHOSPHATE SERPL-MCNC: 3.7 MG/DL — SIGNIFICANT CHANGE UP (ref 2.5–4.5)
PHOSPHATE SERPL-MCNC: 3.8 MG/DL — SIGNIFICANT CHANGE UP (ref 2.5–4.5)
PHOSPHATE SERPL-MCNC: 3.9 MG/DL — SIGNIFICANT CHANGE UP (ref 2.5–4.5)
PHOSPHATE SERPL-MCNC: 4 MG/DL — SIGNIFICANT CHANGE UP (ref 2.5–4.5)
PHOSPHATE SERPL-MCNC: 4.1 MG/DL — SIGNIFICANT CHANGE UP (ref 2.5–4.5)
PHOSPHATE SERPL-MCNC: 4.1 MG/DL — SIGNIFICANT CHANGE UP (ref 2.5–4.5)
PHOSPHATE SERPL-MCNC: 4.9 MG/DL — HIGH (ref 2.5–4.5)
PHOSPHATE SERPL-MCNC: 5.1 MG/DL — HIGH (ref 2.5–4.5)
PLAT MORPH BLD: NORMAL — SIGNIFICANT CHANGE UP
PLAT MORPH BLD: NORMAL — SIGNIFICANT CHANGE UP
PLATELET # BLD AUTO: 191 K/UL — SIGNIFICANT CHANGE UP (ref 150–400)
PLATELET # BLD AUTO: 209 K/UL — SIGNIFICANT CHANGE UP (ref 150–400)
PLATELET # BLD AUTO: 229 K/UL — SIGNIFICANT CHANGE UP (ref 150–400)
PLATELET # BLD AUTO: 245 K/UL — SIGNIFICANT CHANGE UP (ref 150–400)
PLATELET # BLD AUTO: 248 K/UL — SIGNIFICANT CHANGE UP (ref 150–400)
PLATELET # BLD AUTO: 251 K/UL — SIGNIFICANT CHANGE UP (ref 150–400)
PLATELET # BLD AUTO: 257 K/UL — SIGNIFICANT CHANGE UP (ref 150–400)
PLATELET # BLD AUTO: 258 K/UL — SIGNIFICANT CHANGE UP (ref 150–400)
PLATELET # BLD AUTO: 258 K/UL — SIGNIFICANT CHANGE UP (ref 150–400)
PLATELET # BLD AUTO: 273 K/UL — SIGNIFICANT CHANGE UP (ref 150–400)
PLATELET # BLD AUTO: 278 K/UL — SIGNIFICANT CHANGE UP (ref 150–400)
PLATELET # BLD AUTO: 285 K/UL — SIGNIFICANT CHANGE UP (ref 150–400)
PLATELET # BLD AUTO: 294 K/UL — SIGNIFICANT CHANGE UP (ref 150–400)
PLATELET # BLD AUTO: 305 K/UL — SIGNIFICANT CHANGE UP (ref 150–400)
PLATELET # BLD AUTO: 308 K/UL — SIGNIFICANT CHANGE UP (ref 150–400)
PLATELET # BLD AUTO: 355 K/UL — SIGNIFICANT CHANGE UP (ref 150–400)
PLATELET # BLD AUTO: 396 K/UL — SIGNIFICANT CHANGE UP (ref 150–400)
PLATELET # BLD AUTO: 400 K/UL — SIGNIFICANT CHANGE UP (ref 150–400)
PO2 BLDV: 39 MMHG — SIGNIFICANT CHANGE UP (ref 25–45)
PO2 BLDV: 40 MMHG — SIGNIFICANT CHANGE UP (ref 25–45)
PO2 BLDV: 48 MMHG — HIGH (ref 25–45)
PO2 BLDV: 57 MMHG — HIGH (ref 25–45)
POIKILOCYTOSIS BLD QL AUTO: SIGNIFICANT CHANGE UP
POIKILOCYTOSIS BLD QL AUTO: SLIGHT — SIGNIFICANT CHANGE UP
POLYCHROMASIA BLD QL SMEAR: SLIGHT — SIGNIFICANT CHANGE UP
POLYCHROMASIA BLD QL SMEAR: SLIGHT — SIGNIFICANT CHANGE UP
POTASSIUM BLDV-SCNC: 4.1 MMOL/L — SIGNIFICANT CHANGE UP (ref 3.5–5.1)
POTASSIUM BLDV-SCNC: 4.3 MMOL/L — SIGNIFICANT CHANGE UP (ref 3.5–5.1)
POTASSIUM BLDV-SCNC: 4.5 MMOL/L — SIGNIFICANT CHANGE UP (ref 3.5–5.1)
POTASSIUM BLDV-SCNC: 4.7 MMOL/L — SIGNIFICANT CHANGE UP (ref 3.5–5.1)
POTASSIUM SERPL-MCNC: 3.4 MMOL/L — LOW (ref 3.5–5.3)
POTASSIUM SERPL-MCNC: 3.5 MMOL/L — SIGNIFICANT CHANGE UP (ref 3.5–5.3)
POTASSIUM SERPL-MCNC: 3.6 MMOL/L — SIGNIFICANT CHANGE UP (ref 3.5–5.3)
POTASSIUM SERPL-MCNC: 3.9 MMOL/L — SIGNIFICANT CHANGE UP (ref 3.5–5.3)
POTASSIUM SERPL-MCNC: 4 MMOL/L — SIGNIFICANT CHANGE UP (ref 3.5–5.3)
POTASSIUM SERPL-MCNC: 4.1 MMOL/L — SIGNIFICANT CHANGE UP (ref 3.5–5.3)
POTASSIUM SERPL-MCNC: 4.2 MMOL/L — SIGNIFICANT CHANGE UP (ref 3.5–5.3)
POTASSIUM SERPL-MCNC: 4.2 MMOL/L — SIGNIFICANT CHANGE UP (ref 3.5–5.3)
POTASSIUM SERPL-MCNC: 4.4 MMOL/L — SIGNIFICANT CHANGE UP (ref 3.5–5.3)
POTASSIUM SERPL-MCNC: 4.5 MMOL/L — SIGNIFICANT CHANGE UP (ref 3.5–5.3)
POTASSIUM SERPL-MCNC: 4.6 MMOL/L — SIGNIFICANT CHANGE UP (ref 3.5–5.3)
POTASSIUM SERPL-MCNC: 4.7 MMOL/L — SIGNIFICANT CHANGE UP (ref 3.5–5.3)
POTASSIUM SERPL-MCNC: 4.9 MMOL/L — SIGNIFICANT CHANGE UP (ref 3.5–5.3)
POTASSIUM SERPL-MCNC: 5 MMOL/L — SIGNIFICANT CHANGE UP (ref 3.5–5.3)
POTASSIUM SERPL-SCNC: 3.4 MMOL/L — LOW (ref 3.5–5.3)
POTASSIUM SERPL-SCNC: 3.5 MMOL/L — SIGNIFICANT CHANGE UP (ref 3.5–5.3)
POTASSIUM SERPL-SCNC: 3.6 MMOL/L — SIGNIFICANT CHANGE UP (ref 3.5–5.3)
POTASSIUM SERPL-SCNC: 3.9 MMOL/L — SIGNIFICANT CHANGE UP (ref 3.5–5.3)
POTASSIUM SERPL-SCNC: 4 MMOL/L — SIGNIFICANT CHANGE UP (ref 3.5–5.3)
POTASSIUM SERPL-SCNC: 4.1 MMOL/L — SIGNIFICANT CHANGE UP (ref 3.5–5.3)
POTASSIUM SERPL-SCNC: 4.2 MMOL/L — SIGNIFICANT CHANGE UP (ref 3.5–5.3)
POTASSIUM SERPL-SCNC: 4.2 MMOL/L — SIGNIFICANT CHANGE UP (ref 3.5–5.3)
POTASSIUM SERPL-SCNC: 4.4 MMOL/L — SIGNIFICANT CHANGE UP (ref 3.5–5.3)
POTASSIUM SERPL-SCNC: 4.5 MMOL/L — SIGNIFICANT CHANGE UP (ref 3.5–5.3)
POTASSIUM SERPL-SCNC: 4.6 MMOL/L — SIGNIFICANT CHANGE UP (ref 3.5–5.3)
POTASSIUM SERPL-SCNC: 4.7 MMOL/L — SIGNIFICANT CHANGE UP (ref 3.5–5.3)
POTASSIUM SERPL-SCNC: 4.9 MMOL/L — SIGNIFICANT CHANGE UP (ref 3.5–5.3)
POTASSIUM SERPL-SCNC: 5 MMOL/L — SIGNIFICANT CHANGE UP (ref 3.5–5.3)
POTASSIUM UR-SCNC: 48 MMOL/L — SIGNIFICANT CHANGE UP
PROCALCITONIN SERPL-MCNC: 1.6 NG/ML — HIGH (ref 0.02–0.1)
PROCALCITONIN SERPL-MCNC: 1.94 NG/ML — HIGH (ref 0.02–0.1)
PROT ?TM UR-MCNC: 37 MG/DL — HIGH (ref 0–12)
PROT SERPL-MCNC: 4.2 G/DL — LOW (ref 6–8.3)
PROT SERPL-MCNC: 4.2 G/DL — LOW (ref 6–8.3)
PROT SERPL-MCNC: 4.4 G/DL — LOW (ref 6–8.3)
PROT SERPL-MCNC: 4.5 G/DL — LOW (ref 6–8.3)
PROT SERPL-MCNC: 4.6 G/DL — LOW (ref 6–8.3)
PROT SERPL-MCNC: 4.6 G/DL — LOW (ref 6–8.3)
PROT SERPL-MCNC: 4.7 G/DL — LOW (ref 6–8.3)
PROT SERPL-MCNC: 4.7 G/DL — LOW (ref 6–8.3)
PROT SERPL-MCNC: 4.8 G/DL — LOW (ref 6–8.3)
PROT SERPL-MCNC: 4.9 G/DL — LOW (ref 6–8.3)
PROT SERPL-MCNC: 5 G/DL — LOW (ref 6–8.3)
PROT SERPL-MCNC: 5.1 G/DL — LOW (ref 6–8.3)
PROT SERPL-MCNC: 5.6 G/DL — LOW (ref 6–8.3)
PROT SERPL-MCNC: 6.3 G/DL — SIGNIFICANT CHANGE UP (ref 6–8.3)
PROT UR-MCNC: 30 MG/DL
PROT/CREAT UR-RTO: 0.6 RATIO — HIGH (ref 0–0.2)
PROTHROM AB SERPL-ACNC: 14.1 SEC — HIGH (ref 10.5–13.4)
PROTHROM AB SERPL-ACNC: 14.2 SEC — HIGH (ref 10.5–13.4)
PROTHROM AB SERPL-ACNC: 16.9 SEC — HIGH (ref 10.5–13.4)
PROTHROM AB SERPL-ACNC: 17.1 SEC — HIGH (ref 10.5–13.4)
RAPID RVP RESULT: SIGNIFICANT CHANGE UP
RBC # BLD: 1.79 M/UL — LOW (ref 4.2–5.8)
RBC # BLD: 2.29 M/UL — LOW (ref 4.2–5.8)
RBC # BLD: 2.31 M/UL — LOW (ref 4.2–5.8)
RBC # BLD: 2.36 M/UL — LOW (ref 4.2–5.8)
RBC # BLD: 2.38 M/UL — LOW (ref 4.2–5.8)
RBC # BLD: 2.44 M/UL — LOW (ref 4.2–5.8)
RBC # BLD: 2.46 M/UL — LOW (ref 4.2–5.8)
RBC # BLD: 2.48 M/UL — LOW (ref 4.2–5.8)
RBC # BLD: 2.5 M/UL — LOW (ref 4.2–5.8)
RBC # BLD: 2.56 M/UL — LOW (ref 4.2–5.8)
RBC # BLD: 2.76 M/UL — LOW (ref 4.2–5.8)
RBC # BLD: 2.79 M/UL — LOW (ref 4.2–5.8)
RBC # BLD: 2.86 M/UL — LOW (ref 4.2–5.8)
RBC # BLD: 2.91 M/UL — LOW (ref 4.2–5.8)
RBC # BLD: 2.93 M/UL — LOW (ref 4.2–5.8)
RBC # BLD: 2.95 M/UL — LOW (ref 4.2–5.8)
RBC # BLD: 2.95 M/UL — LOW (ref 4.2–5.8)
RBC # BLD: 3.03 M/UL — LOW (ref 4.2–5.8)
RBC # FLD: 14.9 % — HIGH (ref 10.3–14.5)
RBC # FLD: 15 % — HIGH (ref 10.3–14.5)
RBC # FLD: 15.2 % — HIGH (ref 10.3–14.5)
RBC # FLD: 15.2 % — HIGH (ref 10.3–14.5)
RBC # FLD: 15.3 % — HIGH (ref 10.3–14.5)
RBC # FLD: 15.3 % — HIGH (ref 10.3–14.5)
RBC # FLD: 15.4 % — HIGH (ref 10.3–14.5)
RBC # FLD: 15.6 % — HIGH (ref 10.3–14.5)
RBC # FLD: 15.7 % — HIGH (ref 10.3–14.5)
RBC # FLD: 15.8 % — HIGH (ref 10.3–14.5)
RBC # FLD: 19.4 % — HIGH (ref 10.3–14.5)
RBC # FLD: 19.8 % — HIGH (ref 10.3–14.5)
RBC # FLD: 20 % — HIGH (ref 10.3–14.5)
RBC # FLD: 20.9 % — HIGH (ref 10.3–14.5)
RBC # FLD: 21.5 % — HIGH (ref 10.3–14.5)
RBC # FLD: 22 % — HIGH (ref 10.3–14.5)
RBC # FLD: 22.4 % — HIGH (ref 10.3–14.5)
RBC # FLD: 24.8 % — HIGH (ref 10.3–14.5)
RBC BLD AUTO: ABNORMAL
RBC BLD AUTO: SIGNIFICANT CHANGE UP
RBC CASTS # UR COMP ASSIST: < 5 /HPF — SIGNIFICANT CHANGE UP
RH IG SCN BLD-IMP: NEGATIVE — SIGNIFICANT CHANGE UP
SAO2 % BLDV: 61.2 % — LOW (ref 67–88)
SAO2 % BLDV: 69.8 % — SIGNIFICANT CHANGE UP (ref 67–88)
SAO2 % BLDV: 77.9 % — SIGNIFICANT CHANGE UP (ref 67–88)
SAO2 % BLDV: 90.3 % — HIGH (ref 67–88)
SARS-COV-2 RNA SPEC QL NAA+PROBE: NEGATIVE — SIGNIFICANT CHANGE UP
SARS-COV-2 RNA SPEC QL NAA+PROBE: SIGNIFICANT CHANGE UP
SARS-COV-2 RNA SPEC QL NAA+PROBE: SIGNIFICANT CHANGE UP
SODIUM SERPL-SCNC: 131 MMOL/L — LOW (ref 135–145)
SODIUM SERPL-SCNC: 133 MMOL/L — LOW (ref 135–145)
SODIUM SERPL-SCNC: 134 MMOL/L — LOW (ref 135–145)
SODIUM SERPL-SCNC: 135 MMOL/L — SIGNIFICANT CHANGE UP (ref 135–145)
SODIUM SERPL-SCNC: 136 MMOL/L — SIGNIFICANT CHANGE UP (ref 135–145)
SODIUM SERPL-SCNC: 136 MMOL/L — SIGNIFICANT CHANGE UP (ref 135–145)
SODIUM SERPL-SCNC: 137 MMOL/L — SIGNIFICANT CHANGE UP (ref 135–145)
SODIUM SERPL-SCNC: 139 MMOL/L — SIGNIFICANT CHANGE UP (ref 135–145)
SODIUM SERPL-SCNC: 139 MMOL/L — SIGNIFICANT CHANGE UP (ref 135–145)
SODIUM SERPL-SCNC: 140 MMOL/L — SIGNIFICANT CHANGE UP (ref 135–145)
SODIUM SERPL-SCNC: 140 MMOL/L — SIGNIFICANT CHANGE UP (ref 135–145)
SODIUM SERPL-SCNC: 143 MMOL/L — SIGNIFICANT CHANGE UP (ref 135–145)
SODIUM SERPL-SCNC: 144 MMOL/L — SIGNIFICANT CHANGE UP (ref 135–145)
SODIUM SERPL-SCNC: 144 MMOL/L — SIGNIFICANT CHANGE UP (ref 135–145)
SODIUM SERPL-SCNC: 151 MMOL/L — HIGH (ref 135–145)
SODIUM SERPL-SCNC: 153 MMOL/L — HIGH (ref 135–145)
SODIUM UR-SCNC: <20 MMOL/L — SIGNIFICANT CHANGE UP
SODIUM UR-SCNC: <20 MMOL/L — SIGNIFICANT CHANGE UP
SP GR SPEC: 1.02 — SIGNIFICANT CHANGE UP (ref 1–1.03)
SPECIMEN SOURCE: SIGNIFICANT CHANGE UP
SPHEROCYTES BLD QL SMEAR: SIGNIFICANT CHANGE UP
TIBC SERPL-MCNC: 153 UG/DL — LOW (ref 220–430)
TROPONIN T SERPL-MCNC: 0.18 NG/ML — CRITICAL HIGH (ref 0–0.01)
TROPONIN T SERPL-MCNC: 0.2 NG/ML — CRITICAL HIGH (ref 0–0.01)
TROPONIN T SERPL-MCNC: 0.22 NG/ML — CRITICAL HIGH (ref 0–0.01)
TSH SERPL-MCNC: 3.12 UIU/ML — SIGNIFICANT CHANGE UP (ref 0.27–4.2)
UIBC SERPL-MCNC: 104 UG/DL — LOW (ref 110–370)
UROBILINOGEN FLD QL: 0.2 E.U./DL — SIGNIFICANT CHANGE UP
UUN UR-MCNC: 682 MG/DL — SIGNIFICANT CHANGE UP
VIT B12 SERPL-MCNC: 1222 PG/ML — SIGNIFICANT CHANGE UP (ref 232–1245)
WBC # BLD: 11.24 K/UL — HIGH (ref 3.8–10.5)
WBC # BLD: 13 K/UL — HIGH (ref 3.8–10.5)
WBC # BLD: 13.24 K/UL — HIGH (ref 3.8–10.5)
WBC # BLD: 13.88 K/UL — HIGH (ref 3.8–10.5)
WBC # BLD: 13.9 K/UL — HIGH (ref 3.8–10.5)
WBC # BLD: 14.14 K/UL — HIGH (ref 3.8–10.5)
WBC # BLD: 14.26 K/UL — HIGH (ref 3.8–10.5)
WBC # BLD: 14.73 K/UL — HIGH (ref 3.8–10.5)
WBC # BLD: 14.95 K/UL — HIGH (ref 3.8–10.5)
WBC # BLD: 6.86 K/UL — SIGNIFICANT CHANGE UP (ref 3.8–10.5)
WBC # BLD: 7.24 K/UL — SIGNIFICANT CHANGE UP (ref 3.8–10.5)
WBC # BLD: 7.61 K/UL — SIGNIFICANT CHANGE UP (ref 3.8–10.5)
WBC # BLD: 7.62 K/UL — SIGNIFICANT CHANGE UP (ref 3.8–10.5)
WBC # BLD: 7.68 K/UL — SIGNIFICANT CHANGE UP (ref 3.8–10.5)
WBC # BLD: 8.12 K/UL — SIGNIFICANT CHANGE UP (ref 3.8–10.5)
WBC # BLD: 8.64 K/UL — SIGNIFICANT CHANGE UP (ref 3.8–10.5)
WBC # BLD: 9.29 K/UL — SIGNIFICANT CHANGE UP (ref 3.8–10.5)
WBC # BLD: 9.42 K/UL — SIGNIFICANT CHANGE UP (ref 3.8–10.5)
WBC # FLD AUTO: 11.24 K/UL — HIGH (ref 3.8–10.5)
WBC # FLD AUTO: 13 K/UL — HIGH (ref 3.8–10.5)
WBC # FLD AUTO: 13.24 K/UL — HIGH (ref 3.8–10.5)
WBC # FLD AUTO: 13.88 K/UL — HIGH (ref 3.8–10.5)
WBC # FLD AUTO: 13.9 K/UL — HIGH (ref 3.8–10.5)
WBC # FLD AUTO: 14.14 K/UL — HIGH (ref 3.8–10.5)
WBC # FLD AUTO: 14.26 K/UL — HIGH (ref 3.8–10.5)
WBC # FLD AUTO: 14.73 K/UL — HIGH (ref 3.8–10.5)
WBC # FLD AUTO: 14.95 K/UL — HIGH (ref 3.8–10.5)
WBC # FLD AUTO: 6.86 K/UL — SIGNIFICANT CHANGE UP (ref 3.8–10.5)
WBC # FLD AUTO: 7.24 K/UL — SIGNIFICANT CHANGE UP (ref 3.8–10.5)
WBC # FLD AUTO: 7.61 K/UL — SIGNIFICANT CHANGE UP (ref 3.8–10.5)
WBC # FLD AUTO: 7.62 K/UL — SIGNIFICANT CHANGE UP (ref 3.8–10.5)
WBC # FLD AUTO: 7.68 K/UL — SIGNIFICANT CHANGE UP (ref 3.8–10.5)
WBC # FLD AUTO: 8.12 K/UL — SIGNIFICANT CHANGE UP (ref 3.8–10.5)
WBC # FLD AUTO: 8.64 K/UL — SIGNIFICANT CHANGE UP (ref 3.8–10.5)
WBC # FLD AUTO: 9.29 K/UL — SIGNIFICANT CHANGE UP (ref 3.8–10.5)
WBC # FLD AUTO: 9.42 K/UL — SIGNIFICANT CHANGE UP (ref 3.8–10.5)
WBC UR QL: < 5 /HPF — SIGNIFICANT CHANGE UP

## 2022-01-01 PROCEDURE — 85610 PROTHROMBIN TIME: CPT

## 2022-01-01 PROCEDURE — 99358 PROLONG SERVICE W/O CONTACT: CPT

## 2022-01-01 PROCEDURE — 93010 ELECTROCARDIOGRAM REPORT: CPT

## 2022-01-01 PROCEDURE — 76770 US EXAM ABDO BACK WALL COMP: CPT

## 2022-01-01 PROCEDURE — 86901 BLOOD TYPING SEROLOGIC RH(D): CPT

## 2022-01-01 PROCEDURE — 80048 BASIC METABOLIC PNL TOTAL CA: CPT

## 2022-01-01 PROCEDURE — 97162 PT EVAL MOD COMPLEX 30 MIN: CPT

## 2022-01-01 PROCEDURE — 99233 SBSQ HOSP IP/OBS HIGH 50: CPT

## 2022-01-01 PROCEDURE — G1004: CPT

## 2022-01-01 PROCEDURE — 84132 ASSAY OF SERUM POTASSIUM: CPT

## 2022-01-01 PROCEDURE — 84133 ASSAY OF URINE POTASSIUM: CPT

## 2022-01-01 PROCEDURE — 99223 1ST HOSP IP/OBS HIGH 75: CPT

## 2022-01-01 PROCEDURE — 74230 X-RAY XM SWLNG FUNCJ C+: CPT

## 2022-01-01 PROCEDURE — 99222 1ST HOSP IP/OBS MODERATE 55: CPT

## 2022-01-01 PROCEDURE — 97164 PT RE-EVAL EST PLAN CARE: CPT

## 2022-01-01 PROCEDURE — 85025 COMPLETE CBC W/AUTO DIFF WBC: CPT

## 2022-01-01 PROCEDURE — 36415 COLL VENOUS BLD VENIPUNCTURE: CPT

## 2022-01-01 PROCEDURE — 80053 COMPREHEN METABOLIC PANEL: CPT

## 2022-01-01 PROCEDURE — 99232 SBSQ HOSP IP/OBS MODERATE 35: CPT | Mod: GC

## 2022-01-01 PROCEDURE — 93971 EXTREMITY STUDY: CPT | Mod: 26,RT

## 2022-01-01 PROCEDURE — 82728 ASSAY OF FERRITIN: CPT

## 2022-01-01 PROCEDURE — 92610 EVALUATE SWALLOWING FUNCTION: CPT

## 2022-01-01 PROCEDURE — 82803 BLOOD GASES ANY COMBINATION: CPT

## 2022-01-01 PROCEDURE — 93306 TTE W/DOPPLER COMPLETE: CPT | Mod: 26

## 2022-01-01 PROCEDURE — 82746 ASSAY OF FOLIC ACID SERUM: CPT

## 2022-01-01 PROCEDURE — 99497 ADVNCD CARE PLAN 30 MIN: CPT | Mod: 25

## 2022-01-01 PROCEDURE — 99221 1ST HOSP IP/OBS SF/LOW 40: CPT | Mod: GC

## 2022-01-01 PROCEDURE — 99283 EMERGENCY DEPT VISIT LOW MDM: CPT

## 2022-01-01 PROCEDURE — 70450 CT HEAD/BRAIN W/O DYE: CPT | Mod: 26,MA

## 2022-01-01 PROCEDURE — 99232 SBSQ HOSP IP/OBS MODERATE 35: CPT

## 2022-01-01 PROCEDURE — 71045 X-RAY EXAM CHEST 1 VIEW: CPT

## 2022-01-01 PROCEDURE — 96374 THER/PROPH/DIAG INJ IV PUSH: CPT

## 2022-01-01 PROCEDURE — 83690 ASSAY OF LIPASE: CPT

## 2022-01-01 PROCEDURE — 93971 EXTREMITY STUDY: CPT

## 2022-01-01 PROCEDURE — 94640 AIRWAY INHALATION TREATMENT: CPT

## 2022-01-01 PROCEDURE — 84443 ASSAY THYROID STIM HORMONE: CPT

## 2022-01-01 PROCEDURE — 84100 ASSAY OF PHOSPHORUS: CPT

## 2022-01-01 PROCEDURE — 99284 EMERGENCY DEPT VISIT MOD MDM: CPT

## 2022-01-01 PROCEDURE — 74176 CT ABD & PELVIS W/O CONTRAST: CPT | Mod: 26,MG

## 2022-01-01 PROCEDURE — 83935 ASSAY OF URINE OSMOLALITY: CPT

## 2022-01-01 PROCEDURE — 99291 CRITICAL CARE FIRST HOUR: CPT

## 2022-01-01 PROCEDURE — 84145 PROCALCITONIN (PCT): CPT

## 2022-01-01 PROCEDURE — 0225U NFCT DS DNA&RNA 21 SARSCOV2: CPT

## 2022-01-01 PROCEDURE — 83735 ASSAY OF MAGNESIUM: CPT

## 2022-01-01 PROCEDURE — 97161 PT EVAL LOW COMPLEX 20 MIN: CPT

## 2022-01-01 PROCEDURE — P9016: CPT

## 2022-01-01 PROCEDURE — 84295 ASSAY OF SERUM SODIUM: CPT

## 2022-01-01 PROCEDURE — 99223 1ST HOSP IP/OBS HIGH 75: CPT | Mod: GC

## 2022-01-01 PROCEDURE — 93005 ELECTROCARDIOGRAM TRACING: CPT

## 2022-01-01 PROCEDURE — 85730 THROMBOPLASTIN TIME PARTIAL: CPT

## 2022-01-01 PROCEDURE — 86900 BLOOD TYPING SEROLOGIC ABO: CPT

## 2022-01-01 PROCEDURE — 87635 SARS-COV-2 COVID-19 AMP PRB: CPT

## 2022-01-01 PROCEDURE — 82570 ASSAY OF URINE CREATININE: CPT

## 2022-01-01 PROCEDURE — 70450 CT HEAD/BRAIN W/O DYE: CPT | Mod: 26

## 2022-01-01 PROCEDURE — 83540 ASSAY OF IRON: CPT

## 2022-01-01 PROCEDURE — 83880 ASSAY OF NATRIURETIC PEPTIDE: CPT

## 2022-01-01 PROCEDURE — 99233 SBSQ HOSP IP/OBS HIGH 50: CPT | Mod: GC

## 2022-01-01 PROCEDURE — 93306 TTE W/DOPPLER COMPLETE: CPT

## 2022-01-01 PROCEDURE — 84484 ASSAY OF TROPONIN QUANT: CPT

## 2022-01-01 PROCEDURE — 70450 CT HEAD/BRAIN W/O DYE: CPT

## 2022-01-01 PROCEDURE — 96375 TX/PRO/DX INJ NEW DRUG ADDON: CPT

## 2022-01-01 PROCEDURE — 99239 HOSP IP/OBS DSCHRG MGMT >30: CPT

## 2022-01-01 PROCEDURE — 82553 CREATINE MB FRACTION: CPT

## 2022-01-01 PROCEDURE — 71250 CT THORAX DX C-: CPT | Mod: MG

## 2022-01-01 PROCEDURE — 71250 CT THORAX DX C-: CPT | Mod: 26,MG

## 2022-01-01 PROCEDURE — 76770 US EXAM ABDO BACK WALL COMP: CPT | Mod: 26

## 2022-01-01 PROCEDURE — 99285 EMERGENCY DEPT VISIT HI MDM: CPT

## 2022-01-01 PROCEDURE — 96376 TX/PRO/DX INJ SAME DRUG ADON: CPT

## 2022-01-01 PROCEDURE — 84540 ASSAY OF URINE/UREA-N: CPT

## 2022-01-01 PROCEDURE — 82330 ASSAY OF CALCIUM: CPT

## 2022-01-01 PROCEDURE — 84156 ASSAY OF PROTEIN URINE: CPT

## 2022-01-01 PROCEDURE — 74176 CT ABD & PELVIS W/O CONTRAST: CPT | Mod: MG

## 2022-01-01 PROCEDURE — 86850 RBC ANTIBODY SCREEN: CPT

## 2022-01-01 PROCEDURE — 92526 ORAL FUNCTION THERAPY: CPT

## 2022-01-01 PROCEDURE — 87040 BLOOD CULTURE FOR BACTERIA: CPT

## 2022-01-01 PROCEDURE — 74230 X-RAY XM SWLNG FUNCJ C+: CPT | Mod: 26

## 2022-01-01 PROCEDURE — 99358 PROLONG SERVICE W/O CONTACT: CPT | Mod: NC

## 2022-01-01 PROCEDURE — 86923 COMPATIBILITY TEST ELECTRIC: CPT

## 2022-01-01 PROCEDURE — 36430 TRANSFUSION BLD/BLD COMPNT: CPT

## 2022-01-01 PROCEDURE — 99291 CRITICAL CARE FIRST HOUR: CPT | Mod: FT,25

## 2022-01-01 PROCEDURE — 82550 ASSAY OF CK (CPK): CPT

## 2022-01-01 PROCEDURE — 71045 X-RAY EXAM CHEST 1 VIEW: CPT | Mod: 26

## 2022-01-01 PROCEDURE — 82607 VITAMIN B-12: CPT

## 2022-01-01 PROCEDURE — 92611 MOTION FLUOROSCOPY/SWALLOW: CPT

## 2022-01-01 PROCEDURE — 83930 ASSAY OF BLOOD OSMOLALITY: CPT

## 2022-01-01 PROCEDURE — 83605 ASSAY OF LACTIC ACID: CPT

## 2022-01-01 PROCEDURE — 85027 COMPLETE CBC AUTOMATED: CPT

## 2022-01-01 PROCEDURE — 83550 IRON BINDING TEST: CPT

## 2022-01-01 PROCEDURE — 84300 ASSAY OF URINE SODIUM: CPT

## 2022-01-01 RX ORDER — ASCORBIC ACID 60 MG
500 TABLET,CHEWABLE ORAL DAILY
Refills: 0 | Status: DISCONTINUED | OUTPATIENT
Start: 2022-01-01 | End: 2022-01-01

## 2022-01-01 RX ORDER — IPRATROPIUM/ALBUTEROL SULFATE 18-103MCG
3 AEROSOL WITH ADAPTER (GRAM) INHALATION EVERY 6 HOURS
Refills: 0 | Status: DISCONTINUED | OUTPATIENT
Start: 2022-01-01 | End: 2022-01-01

## 2022-01-01 RX ORDER — CEFTRIAXONE 500 MG/1
1000 INJECTION, POWDER, FOR SOLUTION INTRAMUSCULAR; INTRAVENOUS ONCE
Refills: 0 | Status: COMPLETED | OUTPATIENT
Start: 2022-01-01 | End: 2022-01-01

## 2022-01-01 RX ORDER — HYDROMORPHONE HYDROCHLORIDE 2 MG/ML
0.5 INJECTION INTRAMUSCULAR; INTRAVENOUS; SUBCUTANEOUS ONCE
Refills: 0 | Status: DISCONTINUED | OUTPATIENT
Start: 2022-01-01 | End: 2022-01-01

## 2022-01-01 RX ORDER — FINASTERIDE 5 MG/1
1 TABLET, FILM COATED ORAL
Qty: 0 | Refills: 0 | DISCHARGE

## 2022-01-01 RX ORDER — CEPHALEXIN 500 MG
1 CAPSULE ORAL
Qty: 0 | Refills: 0 | DISCHARGE
Start: 2022-01-01

## 2022-01-01 RX ORDER — CEPHALEXIN 500 MG
250 CAPSULE ORAL EVERY 12 HOURS
Refills: 0 | Status: DISCONTINUED | OUTPATIENT
Start: 2022-01-01 | End: 2022-01-01

## 2022-01-01 RX ORDER — POTASSIUM CHLORIDE 20 MEQ
40 PACKET (EA) ORAL ONCE
Refills: 0 | Status: COMPLETED | OUTPATIENT
Start: 2022-01-01 | End: 2022-01-01

## 2022-01-01 RX ORDER — SERTRALINE 25 MG/1
1 TABLET, FILM COATED ORAL
Qty: 0 | Refills: 0 | DISCHARGE

## 2022-01-01 RX ORDER — PIPERACILLIN AND TAZOBACTAM 4; .5 G/20ML; G/20ML
2.25 INJECTION, POWDER, LYOPHILIZED, FOR SOLUTION INTRAVENOUS ONCE
Refills: 0 | Status: COMPLETED | OUTPATIENT
Start: 2022-01-01 | End: 2022-01-01

## 2022-01-01 RX ORDER — SODIUM CHLORIDE 9 MG/ML
500 INJECTION, SOLUTION INTRAVENOUS ONCE
Refills: 0 | Status: COMPLETED | OUTPATIENT
Start: 2022-01-01 | End: 2022-01-01

## 2022-01-01 RX ORDER — PANTOPRAZOLE SODIUM 20 MG/1
40 TABLET, DELAYED RELEASE ORAL ONCE
Refills: 0 | Status: COMPLETED | OUTPATIENT
Start: 2022-01-01 | End: 2022-01-01

## 2022-01-01 RX ORDER — PIPERACILLIN AND TAZOBACTAM 4; .5 G/20ML; G/20ML
2.25 INJECTION, POWDER, LYOPHILIZED, FOR SOLUTION INTRAVENOUS EVERY 8 HOURS
Refills: 0 | Status: COMPLETED | OUTPATIENT
Start: 2022-01-01 | End: 2022-01-01

## 2022-01-01 RX ORDER — SODIUM CHLORIDE 9 MG/ML
1000 INJECTION, SOLUTION INTRAVENOUS ONCE
Refills: 0 | Status: COMPLETED | OUTPATIENT
Start: 2022-01-01 | End: 2022-01-01

## 2022-01-01 RX ORDER — ONDANSETRON 8 MG/1
4 TABLET, FILM COATED ORAL ONCE
Refills: 0 | Status: COMPLETED | OUTPATIENT
Start: 2022-01-01 | End: 2022-01-01

## 2022-01-01 RX ORDER — CEFPODOXIME PROXETIL 100 MG
1 TABLET ORAL
Qty: 3 | Refills: 0
Start: 2022-01-01 | End: 2022-01-01

## 2022-01-01 RX ORDER — LIDOCAINE 4 G/100G
1 CREAM TOPICAL
Qty: 0 | Refills: 0 | DISCHARGE

## 2022-01-01 RX ORDER — ATORVASTATIN CALCIUM 80 MG/1
10 TABLET, FILM COATED ORAL AT BEDTIME
Refills: 0 | Status: DISCONTINUED | OUTPATIENT
Start: 2022-01-01 | End: 2022-01-01

## 2022-01-01 RX ORDER — FUROSEMIDE 40 MG
1 TABLET ORAL
Qty: 0 | Refills: 0 | DISCHARGE

## 2022-01-01 RX ORDER — SODIUM CHLORIDE 9 MG/ML
500 INJECTION, SOLUTION INTRAVENOUS
Refills: 0 | Status: DISCONTINUED | OUTPATIENT
Start: 2022-01-01 | End: 2022-01-01

## 2022-01-01 RX ORDER — APIXABAN 2.5 MG/1
2.5 TABLET, FILM COATED ORAL EVERY 12 HOURS
Refills: 0 | Status: DISCONTINUED | OUTPATIENT
Start: 2022-01-01 | End: 2022-01-01

## 2022-01-01 RX ORDER — ONDANSETRON 8 MG/1
4 TABLET, FILM COATED ORAL EVERY 6 HOURS
Refills: 0 | Status: DISCONTINUED | OUTPATIENT
Start: 2022-01-01 | End: 2022-01-01

## 2022-01-01 RX ORDER — PIPERACILLIN AND TAZOBACTAM 4; .5 G/20ML; G/20ML
2.25 INJECTION, POWDER, LYOPHILIZED, FOR SOLUTION INTRAVENOUS
Qty: 0 | Refills: 0 | DISCHARGE
End: 2022-01-01

## 2022-01-01 RX ORDER — HYDROMORPHONE HYDROCHLORIDE 2 MG/ML
0.5 INJECTION INTRAMUSCULAR; INTRAVENOUS; SUBCUTANEOUS
Refills: 0 | Status: DISCONTINUED | OUTPATIENT
Start: 2022-01-01 | End: 2022-01-01

## 2022-01-01 RX ORDER — DOXEPIN HCL 100 MG
1 CAPSULE ORAL
Qty: 0 | Refills: 0 | DISCHARGE

## 2022-01-01 RX ORDER — FUROSEMIDE 40 MG
20 TABLET ORAL EVERY 24 HOURS
Refills: 0 | Status: DISCONTINUED | OUTPATIENT
Start: 2022-01-01 | End: 2022-01-01

## 2022-01-01 RX ORDER — HALOPERIDOL DECANOATE 100 MG/ML
2.5 INJECTION INTRAMUSCULAR ONCE
Refills: 0 | Status: COMPLETED | OUTPATIENT
Start: 2022-01-01 | End: 2022-01-01

## 2022-01-01 RX ORDER — POTASSIUM CHLORIDE 20 MEQ
20 PACKET (EA) ORAL ONCE
Refills: 0 | Status: COMPLETED | OUTPATIENT
Start: 2022-01-01 | End: 2022-01-01

## 2022-01-01 RX ORDER — FINASTERIDE 5 MG/1
5 TABLET, FILM COATED ORAL DAILY
Refills: 0 | Status: DISCONTINUED | OUTPATIENT
Start: 2022-01-01 | End: 2022-01-01

## 2022-01-01 RX ORDER — SODIUM CHLORIDE 9 MG/ML
1000 INJECTION, SOLUTION INTRAVENOUS
Refills: 0 | Status: DISCONTINUED | OUTPATIENT
Start: 2022-01-01 | End: 2022-01-01

## 2022-01-01 RX ORDER — HYDROMORPHONE HYDROCHLORIDE 2 MG/ML
2 INJECTION INTRAMUSCULAR; INTRAVENOUS; SUBCUTANEOUS EVERY 6 HOURS
Refills: 0 | Status: DISCONTINUED | OUTPATIENT
Start: 2022-01-01 | End: 2022-01-01

## 2022-01-01 RX ORDER — PANTOPRAZOLE SODIUM 20 MG/1
40 TABLET, DELAYED RELEASE ORAL EVERY 12 HOURS
Refills: 0 | Status: DISCONTINUED | OUTPATIENT
Start: 2022-01-01 | End: 2022-01-01

## 2022-01-01 RX ORDER — ROBINUL 0.2 MG/ML
0.4 INJECTION INTRAMUSCULAR; INTRAVENOUS
Refills: 0 | Status: DISCONTINUED | OUTPATIENT
Start: 2022-01-01 | End: 2022-01-01

## 2022-01-01 RX ORDER — SODIUM CHLORIDE 9 MG/ML
1000 INJECTION INTRAMUSCULAR; INTRAVENOUS; SUBCUTANEOUS ONCE
Refills: 0 | Status: COMPLETED | OUTPATIENT
Start: 2022-01-01 | End: 2022-01-01

## 2022-01-01 RX ORDER — PROTHROMBIN COMPLEX CONCENTRATE (HUMAN) 25.5; 16.5; 24; 22; 22; 26 [IU]/ML; [IU]/ML; [IU]/ML; [IU]/ML; [IU]/ML; [IU]/ML
2000 POWDER, FOR SOLUTION INTRAVENOUS ONCE
Refills: 0 | Status: COMPLETED | OUTPATIENT
Start: 2022-01-01 | End: 2022-01-01

## 2022-01-01 RX ORDER — SERTRALINE 25 MG/1
50 TABLET, FILM COATED ORAL DAILY
Refills: 0 | Status: DISCONTINUED | OUTPATIENT
Start: 2022-01-01 | End: 2022-01-01

## 2022-01-01 RX ORDER — ACETAMINOPHEN 500 MG
650 TABLET ORAL EVERY 6 HOURS
Refills: 0 | Status: DISCONTINUED | OUTPATIENT
Start: 2022-01-01 | End: 2022-01-01

## 2022-01-01 RX ORDER — PANTOPRAZOLE SODIUM 20 MG/1
40 TABLET, DELAYED RELEASE ORAL
Refills: 0 | Status: DISCONTINUED | OUTPATIENT
Start: 2022-01-01 | End: 2022-01-01

## 2022-01-01 RX ORDER — SODIUM CHLORIDE 9 MG/ML
1000 INJECTION INTRAMUSCULAR; INTRAVENOUS; SUBCUTANEOUS
Refills: 0 | Status: DISCONTINUED | OUTPATIENT
Start: 2022-01-01 | End: 2022-01-01

## 2022-01-01 RX ORDER — POLYETHYLENE GLYCOL 3350 17 G/17G
17 POWDER, FOR SOLUTION ORAL DAILY
Refills: 0 | Status: DISCONTINUED | OUTPATIENT
Start: 2022-01-01 | End: 2022-01-01

## 2022-01-01 RX ORDER — HYDROMORPHONE HYDROCHLORIDE 2 MG/ML
0.5 INJECTION INTRAMUSCULAR; INTRAVENOUS; SUBCUTANEOUS EVERY 6 HOURS
Refills: 0 | Status: DISCONTINUED | OUTPATIENT
Start: 2022-01-01 | End: 2022-01-01

## 2022-01-01 RX ORDER — QUETIAPINE FUMARATE 200 MG/1
1 TABLET, FILM COATED ORAL
Qty: 0 | Refills: 0 | DISCHARGE
Start: 2022-01-01

## 2022-01-01 RX ORDER — PANTOPRAZOLE SODIUM 20 MG/1
8 TABLET, DELAYED RELEASE ORAL
Qty: 80 | Refills: 0 | Status: DISCONTINUED | OUTPATIENT
Start: 2022-01-01 | End: 2022-01-01

## 2022-01-01 RX ORDER — CEFTRIAXONE 500 MG/1
2000 INJECTION, POWDER, FOR SOLUTION INTRAMUSCULAR; INTRAVENOUS EVERY 24 HOURS
Refills: 0 | Status: DISCONTINUED | OUTPATIENT
Start: 2022-01-01 | End: 2022-01-01

## 2022-01-01 RX ORDER — CARVEDILOL PHOSPHATE 80 MG/1
3.12 CAPSULE, EXTENDED RELEASE ORAL EVERY 12 HOURS
Refills: 0 | Status: DISCONTINUED | OUTPATIENT
Start: 2022-01-01 | End: 2022-01-01

## 2022-01-01 RX ORDER — TAMSULOSIN HYDROCHLORIDE 0.4 MG/1
0.4 CAPSULE ORAL AT BEDTIME
Refills: 0 | Status: DISCONTINUED | OUTPATIENT
Start: 2022-01-01 | End: 2022-01-01

## 2022-01-01 RX ORDER — ATORVASTATIN CALCIUM 80 MG/1
1 TABLET, FILM COATED ORAL
Qty: 0 | Refills: 0 | DISCHARGE

## 2022-01-01 RX ORDER — PANTOPRAZOLE SODIUM 20 MG/1
40 TABLET, DELAYED RELEASE ORAL
Qty: 0 | Refills: 0 | DISCHARGE
Start: 2022-01-01

## 2022-01-01 RX ORDER — ASCORBIC ACID 60 MG
1 TABLET,CHEWABLE ORAL
Qty: 0 | Refills: 0 | DISCHARGE

## 2022-01-01 RX ORDER — ACETAMINOPHEN 500 MG
2 TABLET ORAL
Qty: 0 | Refills: 0 | DISCHARGE

## 2022-01-01 RX ORDER — SERTRALINE 25 MG/1
50 TABLET, FILM COATED ORAL EVERY 24 HOURS
Refills: 0 | Status: DISCONTINUED | OUTPATIENT
Start: 2022-01-01 | End: 2022-01-01

## 2022-01-01 RX ORDER — CARVEDILOL PHOSPHATE 80 MG/1
1 CAPSULE, EXTENDED RELEASE ORAL
Qty: 0 | Refills: 0 | DISCHARGE

## 2022-01-01 RX ORDER — APIXABAN 2.5 MG/1
1 TABLET, FILM COATED ORAL
Qty: 0 | Refills: 0 | DISCHARGE

## 2022-01-01 RX ORDER — ZINC SULFATE TAB 220 MG (50 MG ZINC EQUIVALENT) 220 (50 ZN) MG
1 TAB ORAL
Qty: 0 | Refills: 0 | DISCHARGE

## 2022-01-01 RX ORDER — SODIUM BICARBONATE 1 MEQ/ML
0.22 SYRINGE (ML) INTRAVENOUS
Qty: 150 | Refills: 0 | Status: DISCONTINUED | OUTPATIENT
Start: 2022-01-01 | End: 2022-01-01

## 2022-01-01 RX ORDER — SODIUM CHLORIDE 9 MG/ML
2100 INJECTION INTRAMUSCULAR; INTRAVENOUS; SUBCUTANEOUS ONCE
Refills: 0 | Status: DISCONTINUED | OUTPATIENT
Start: 2022-01-01 | End: 2022-01-01

## 2022-01-01 RX ORDER — PANTOPRAZOLE SODIUM 20 MG/1
1 TABLET, DELAYED RELEASE ORAL
Qty: 60 | Refills: 0
Start: 2022-01-01 | End: 2022-10-11

## 2022-01-01 RX ORDER — TAMSULOSIN HYDROCHLORIDE 0.4 MG/1
1 CAPSULE ORAL
Qty: 0 | Refills: 0 | DISCHARGE

## 2022-01-01 RX ORDER — QUETIAPINE FUMARATE 200 MG/1
25 TABLET, FILM COATED ORAL AT BEDTIME
Refills: 0 | Status: DISCONTINUED | OUTPATIENT
Start: 2022-01-01 | End: 2022-01-01

## 2022-01-01 RX ORDER — DOCUSATE SODIUM 100 MG
1 CAPSULE ORAL
Qty: 0 | Refills: 0 | DISCHARGE

## 2022-01-01 RX ORDER — LIDOCAINE 4 G/100G
1 CREAM TOPICAL DAILY
Refills: 0 | Status: DISCONTINUED | OUTPATIENT
Start: 2022-01-01 | End: 2022-01-01

## 2022-01-01 RX ORDER — SODIUM CHLORIDE 9 MG/ML
1000 INJECTION, SOLUTION INTRAVENOUS ONCE
Refills: 0 | Status: DISCONTINUED | OUTPATIENT
Start: 2022-01-01 | End: 2022-01-01

## 2022-01-01 RX ORDER — VANCOMYCIN HCL 1 G
1000 VIAL (EA) INTRAVENOUS ONCE
Refills: 0 | Status: COMPLETED | OUTPATIENT
Start: 2022-01-01 | End: 2022-01-01

## 2022-01-01 RX ORDER — FENOFIBRATE,MICRONIZED 130 MG
1 CAPSULE ORAL
Qty: 0 | Refills: 0 | DISCHARGE

## 2022-01-01 RX ORDER — PIPERACILLIN AND TAZOBACTAM 4; .5 G/20ML; G/20ML
3.38 INJECTION, POWDER, LYOPHILIZED, FOR SOLUTION INTRAVENOUS ONCE
Refills: 0 | Status: DISCONTINUED | OUTPATIENT
Start: 2022-01-01 | End: 2022-01-01

## 2022-01-01 RX ORDER — METRONIDAZOLE 500 MG
500 TABLET ORAL ONCE
Refills: 0 | Status: COMPLETED | OUTPATIENT
Start: 2022-01-01 | End: 2022-01-01

## 2022-01-01 RX ORDER — FINASTERIDE 5 MG/1
5 TABLET, FILM COATED ORAL EVERY 24 HOURS
Refills: 0 | Status: DISCONTINUED | OUTPATIENT
Start: 2022-01-01 | End: 2022-01-01

## 2022-01-01 RX ORDER — SENNA PLUS 8.6 MG/1
2 TABLET ORAL AT BEDTIME
Refills: 0 | Status: DISCONTINUED | OUTPATIENT
Start: 2022-01-01 | End: 2022-01-01

## 2022-01-01 RX ORDER — PIPERACILLIN AND TAZOBACTAM 4; .5 G/20ML; G/20ML
3.38 INJECTION, POWDER, LYOPHILIZED, FOR SOLUTION INTRAVENOUS ONCE
Refills: 0 | Status: COMPLETED | OUTPATIENT
Start: 2022-01-01 | End: 2022-01-01

## 2022-01-01 RX ORDER — SODIUM CHLORIDE 9 MG/ML
500 INJECTION INTRAMUSCULAR; INTRAVENOUS; SUBCUTANEOUS ONCE
Refills: 0 | Status: COMPLETED | OUTPATIENT
Start: 2022-01-01 | End: 2022-01-01

## 2022-01-01 RX ORDER — HYDRALAZINE HCL 50 MG
1 TABLET ORAL
Qty: 0 | Refills: 0 | DISCHARGE

## 2022-01-01 RX ORDER — PIPERACILLIN AND TAZOBACTAM 4; .5 G/20ML; G/20ML
2.25 INJECTION, POWDER, LYOPHILIZED, FOR SOLUTION INTRAVENOUS EVERY 6 HOURS
Refills: 0 | Status: DISCONTINUED | OUTPATIENT
Start: 2022-01-01 | End: 2022-01-01

## 2022-01-01 RX ORDER — HYDRALAZINE HCL 50 MG
50 TABLET ORAL EVERY 8 HOURS
Refills: 0 | Status: DISCONTINUED | OUTPATIENT
Start: 2022-01-01 | End: 2022-01-01

## 2022-01-01 RX ORDER — FENOFIBRATE,MICRONIZED 130 MG
145 CAPSULE ORAL EVERY 24 HOURS
Refills: 0 | Status: DISCONTINUED | OUTPATIENT
Start: 2022-01-01 | End: 2022-01-01

## 2022-01-01 RX ORDER — HYDROMORPHONE HYDROCHLORIDE 2 MG/ML
0.5 INJECTION INTRAMUSCULAR; INTRAVENOUS; SUBCUTANEOUS EVERY 4 HOURS
Refills: 0 | Status: DISCONTINUED | OUTPATIENT
Start: 2022-01-01 | End: 2022-01-01

## 2022-01-01 RX ADMIN — HYDROMORPHONE HYDROCHLORIDE 0.5 MILLIGRAM(S): 2 INJECTION INTRAMUSCULAR; INTRAVENOUS; SUBCUTANEOUS at 05:44

## 2022-01-01 RX ADMIN — Medication 650 MILLIGRAM(S): at 10:18

## 2022-01-01 RX ADMIN — HYDROMORPHONE HYDROCHLORIDE 0.5 MILLIGRAM(S): 2 INJECTION INTRAMUSCULAR; INTRAVENOUS; SUBCUTANEOUS at 22:15

## 2022-01-01 RX ADMIN — PIPERACILLIN AND TAZOBACTAM 200 GRAM(S): 4; .5 INJECTION, POWDER, LYOPHILIZED, FOR SOLUTION INTRAVENOUS at 18:46

## 2022-01-01 RX ADMIN — PIPERACILLIN AND TAZOBACTAM 200 GRAM(S): 4; .5 INJECTION, POWDER, LYOPHILIZED, FOR SOLUTION INTRAVENOUS at 14:40

## 2022-01-01 RX ADMIN — FINASTERIDE 5 MILLIGRAM(S): 5 TABLET, FILM COATED ORAL at 08:39

## 2022-01-01 RX ADMIN — APIXABAN 2.5 MILLIGRAM(S): 2.5 TABLET, FILM COATED ORAL at 09:02

## 2022-01-01 RX ADMIN — Medication 650 MILLIGRAM(S): at 07:03

## 2022-01-01 RX ADMIN — ROBINUL 0.4 MILLIGRAM(S): 0.2 INJECTION INTRAMUSCULAR; INTRAVENOUS at 00:32

## 2022-01-01 RX ADMIN — APIXABAN 2.5 MILLIGRAM(S): 2.5 TABLET, FILM COATED ORAL at 10:40

## 2022-01-01 RX ADMIN — Medication 250 MILLIGRAM(S): at 19:06

## 2022-01-01 RX ADMIN — SERTRALINE 50 MILLIGRAM(S): 25 TABLET, FILM COATED ORAL at 08:39

## 2022-01-01 RX ADMIN — PIPERACILLIN AND TAZOBACTAM 200 GRAM(S): 4; .5 INJECTION, POWDER, LYOPHILIZED, FOR SOLUTION INTRAVENOUS at 14:09

## 2022-01-01 RX ADMIN — SERTRALINE 50 MILLIGRAM(S): 25 TABLET, FILM COATED ORAL at 12:33

## 2022-01-01 RX ADMIN — Medication 3 MILLILITER(S): at 18:15

## 2022-01-01 RX ADMIN — ATORVASTATIN CALCIUM 10 MILLIGRAM(S): 80 TABLET, FILM COATED ORAL at 22:13

## 2022-01-01 RX ADMIN — SERTRALINE 50 MILLIGRAM(S): 25 TABLET, FILM COATED ORAL at 12:01

## 2022-01-01 RX ADMIN — SODIUM CHLORIDE 100 MILLILITER(S): 9 INJECTION, SOLUTION INTRAVENOUS at 13:59

## 2022-01-01 RX ADMIN — APIXABAN 2.5 MILLIGRAM(S): 2.5 TABLET, FILM COATED ORAL at 22:05

## 2022-01-01 RX ADMIN — Medication 3 MILLILITER(S): at 12:26

## 2022-01-01 RX ADMIN — TAMSULOSIN HYDROCHLORIDE 0.4 MILLIGRAM(S): 0.4 CAPSULE ORAL at 22:13

## 2022-01-01 RX ADMIN — PANTOPRAZOLE SODIUM 40 MILLIGRAM(S): 20 TABLET, DELAYED RELEASE ORAL at 17:40

## 2022-01-01 RX ADMIN — APIXABAN 2.5 MILLIGRAM(S): 2.5 TABLET, FILM COATED ORAL at 23:17

## 2022-01-01 RX ADMIN — APIXABAN 2.5 MILLIGRAM(S): 2.5 TABLET, FILM COATED ORAL at 23:13

## 2022-01-01 RX ADMIN — SENNA PLUS 2 TABLET(S): 8.6 TABLET ORAL at 22:33

## 2022-01-01 RX ADMIN — PIPERACILLIN AND TAZOBACTAM 200 GRAM(S): 4; .5 INJECTION, POWDER, LYOPHILIZED, FOR SOLUTION INTRAVENOUS at 01:19

## 2022-01-01 RX ADMIN — TAMSULOSIN HYDROCHLORIDE 0.4 MILLIGRAM(S): 0.4 CAPSULE ORAL at 22:28

## 2022-01-01 RX ADMIN — HYDROMORPHONE HYDROCHLORIDE 0.5 MILLIGRAM(S): 2 INJECTION INTRAMUSCULAR; INTRAVENOUS; SUBCUTANEOUS at 10:01

## 2022-01-01 RX ADMIN — Medication 3 MILLILITER(S): at 05:46

## 2022-01-01 RX ADMIN — HYDROMORPHONE HYDROCHLORIDE 0.5 MILLIGRAM(S): 2 INJECTION INTRAMUSCULAR; INTRAVENOUS; SUBCUTANEOUS at 00:54

## 2022-01-01 RX ADMIN — Medication 3 MILLILITER(S): at 17:40

## 2022-01-01 RX ADMIN — Medication 650 MILLIGRAM(S): at 07:45

## 2022-01-01 RX ADMIN — PIPERACILLIN AND TAZOBACTAM 200 GRAM(S): 4; .5 INJECTION, POWDER, LYOPHILIZED, FOR SOLUTION INTRAVENOUS at 18:40

## 2022-01-01 RX ADMIN — POLYETHYLENE GLYCOL 3350 17 GRAM(S): 17 POWDER, FOR SOLUTION ORAL at 11:08

## 2022-01-01 RX ADMIN — Medication 3 MILLILITER(S): at 23:18

## 2022-01-01 RX ADMIN — SODIUM CHLORIDE 1000 MILLILITER(S): 9 INJECTION, SOLUTION INTRAVENOUS at 17:41

## 2022-01-01 RX ADMIN — SODIUM CHLORIDE 500 MILLILITER(S): 9 INJECTION INTRAMUSCULAR; INTRAVENOUS; SUBCUTANEOUS at 21:08

## 2022-01-01 RX ADMIN — ATORVASTATIN CALCIUM 10 MILLIGRAM(S): 80 TABLET, FILM COATED ORAL at 22:54

## 2022-01-01 RX ADMIN — SODIUM CHLORIDE 50 MILLILITER(S): 9 INJECTION, SOLUTION INTRAVENOUS at 18:25

## 2022-01-01 RX ADMIN — Medication 40 MILLIEQUIVALENT(S): at 15:02

## 2022-01-01 RX ADMIN — PANTOPRAZOLE SODIUM 40 MILLIGRAM(S): 20 TABLET, DELAYED RELEASE ORAL at 07:03

## 2022-01-01 RX ADMIN — Medication 3 MILLILITER(S): at 11:58

## 2022-01-01 RX ADMIN — SODIUM CHLORIDE 1000 MILLILITER(S): 9 INJECTION INTRAMUSCULAR; INTRAVENOUS; SUBCUTANEOUS at 20:33

## 2022-01-01 RX ADMIN — PIPERACILLIN AND TAZOBACTAM 200 GRAM(S): 4; .5 INJECTION, POWDER, LYOPHILIZED, FOR SOLUTION INTRAVENOUS at 06:52

## 2022-01-01 RX ADMIN — SERTRALINE 50 MILLIGRAM(S): 25 TABLET, FILM COATED ORAL at 10:40

## 2022-01-01 RX ADMIN — PIPERACILLIN AND TAZOBACTAM 200 GRAM(S): 4; .5 INJECTION, POWDER, LYOPHILIZED, FOR SOLUTION INTRAVENOUS at 13:03

## 2022-01-01 RX ADMIN — PIPERACILLIN AND TAZOBACTAM 200 GRAM(S): 4; .5 INJECTION, POWDER, LYOPHILIZED, FOR SOLUTION INTRAVENOUS at 13:34

## 2022-01-01 RX ADMIN — ATORVASTATIN CALCIUM 10 MILLIGRAM(S): 80 TABLET, FILM COATED ORAL at 21:40

## 2022-01-01 RX ADMIN — Medication 650 MILLIGRAM(S): at 01:22

## 2022-01-01 RX ADMIN — ATORVASTATIN CALCIUM 10 MILLIGRAM(S): 80 TABLET, FILM COATED ORAL at 22:05

## 2022-01-01 RX ADMIN — SODIUM CHLORIDE 75 MILLILITER(S): 9 INJECTION, SOLUTION INTRAVENOUS at 11:41

## 2022-01-01 RX ADMIN — HYDROMORPHONE HYDROCHLORIDE 0.5 MILLIGRAM(S): 2 INJECTION INTRAMUSCULAR; INTRAVENOUS; SUBCUTANEOUS at 06:11

## 2022-01-01 RX ADMIN — Medication 650 MILLIGRAM(S): at 11:19

## 2022-01-01 RX ADMIN — HYDROMORPHONE HYDROCHLORIDE 0.5 MILLIGRAM(S): 2 INJECTION INTRAMUSCULAR; INTRAVENOUS; SUBCUTANEOUS at 22:30

## 2022-01-01 RX ADMIN — HYDROMORPHONE HYDROCHLORIDE 0.5 MILLIGRAM(S): 2 INJECTION INTRAMUSCULAR; INTRAVENOUS; SUBCUTANEOUS at 04:10

## 2022-01-01 RX ADMIN — ATORVASTATIN CALCIUM 10 MILLIGRAM(S): 80 TABLET, FILM COATED ORAL at 23:14

## 2022-01-01 RX ADMIN — Medication 3 MILLILITER(S): at 06:12

## 2022-01-01 RX ADMIN — HYDROMORPHONE HYDROCHLORIDE 0.5 MILLIGRAM(S): 2 INJECTION INTRAMUSCULAR; INTRAVENOUS; SUBCUTANEOUS at 13:03

## 2022-01-01 RX ADMIN — PIPERACILLIN AND TAZOBACTAM 200 GRAM(S): 4; .5 INJECTION, POWDER, LYOPHILIZED, FOR SOLUTION INTRAVENOUS at 15:43

## 2022-01-01 RX ADMIN — ATORVASTATIN CALCIUM 10 MILLIGRAM(S): 80 TABLET, FILM COATED ORAL at 22:14

## 2022-01-01 RX ADMIN — FINASTERIDE 5 MILLIGRAM(S): 5 TABLET, FILM COATED ORAL at 09:02

## 2022-01-01 RX ADMIN — HYDROMORPHONE HYDROCHLORIDE 0.5 MILLIGRAM(S): 2 INJECTION INTRAMUSCULAR; INTRAVENOUS; SUBCUTANEOUS at 10:22

## 2022-01-01 RX ADMIN — Medication 3 MILLILITER(S): at 12:01

## 2022-01-01 RX ADMIN — HYDROMORPHONE HYDROCHLORIDE 0.5 MILLIGRAM(S): 2 INJECTION INTRAMUSCULAR; INTRAVENOUS; SUBCUTANEOUS at 19:03

## 2022-01-01 RX ADMIN — Medication 3 MILLILITER(S): at 17:04

## 2022-01-01 RX ADMIN — TAMSULOSIN HYDROCHLORIDE 0.4 MILLIGRAM(S): 0.4 CAPSULE ORAL at 22:33

## 2022-01-01 RX ADMIN — TAMSULOSIN HYDROCHLORIDE 0.4 MILLIGRAM(S): 0.4 CAPSULE ORAL at 22:07

## 2022-01-01 RX ADMIN — Medication 3 MILLILITER(S): at 00:47

## 2022-01-01 RX ADMIN — Medication 3 MILLILITER(S): at 06:52

## 2022-01-01 RX ADMIN — SODIUM CHLORIDE 1000 MILLILITER(S): 9 INJECTION INTRAMUSCULAR; INTRAVENOUS; SUBCUTANEOUS at 20:35

## 2022-01-01 RX ADMIN — HYDROMORPHONE HYDROCHLORIDE 0.5 MILLIGRAM(S): 2 INJECTION INTRAMUSCULAR; INTRAVENOUS; SUBCUTANEOUS at 06:00

## 2022-01-01 RX ADMIN — HYDROMORPHONE HYDROCHLORIDE 0.5 MILLIGRAM(S): 2 INJECTION INTRAMUSCULAR; INTRAVENOUS; SUBCUTANEOUS at 16:30

## 2022-01-01 RX ADMIN — CEFTRIAXONE 100 MILLIGRAM(S): 500 INJECTION, POWDER, FOR SOLUTION INTRAMUSCULAR; INTRAVENOUS at 10:19

## 2022-01-01 RX ADMIN — PANTOPRAZOLE SODIUM 10 MG/HR: 20 TABLET, DELAYED RELEASE ORAL at 00:46

## 2022-01-01 RX ADMIN — Medication 3 MILLILITER(S): at 12:52

## 2022-01-01 RX ADMIN — APIXABAN 2.5 MILLIGRAM(S): 2.5 TABLET, FILM COATED ORAL at 22:54

## 2022-01-01 RX ADMIN — APIXABAN 2.5 MILLIGRAM(S): 2.5 TABLET, FILM COATED ORAL at 08:39

## 2022-01-01 RX ADMIN — SENNA PLUS 2 TABLET(S): 8.6 TABLET ORAL at 21:41

## 2022-01-01 RX ADMIN — Medication 40 MILLIEQUIVALENT(S): at 07:51

## 2022-01-01 RX ADMIN — PANTOPRAZOLE SODIUM 40 MILLIGRAM(S): 20 TABLET, DELAYED RELEASE ORAL at 00:18

## 2022-01-01 RX ADMIN — TAMSULOSIN HYDROCHLORIDE 0.4 MILLIGRAM(S): 0.4 CAPSULE ORAL at 22:53

## 2022-01-01 RX ADMIN — PIPERACILLIN AND TAZOBACTAM 200 GRAM(S): 4; .5 INJECTION, POWDER, LYOPHILIZED, FOR SOLUTION INTRAVENOUS at 22:03

## 2022-01-01 RX ADMIN — Medication 40 MILLIEQUIVALENT(S): at 15:00

## 2022-01-01 RX ADMIN — APIXABAN 2.5 MILLIGRAM(S): 2.5 TABLET, FILM COATED ORAL at 22:13

## 2022-01-01 RX ADMIN — HALOPERIDOL DECANOATE 2.5 MILLIGRAM(S): 100 INJECTION INTRAMUSCULAR at 11:50

## 2022-01-01 RX ADMIN — Medication 100 MILLIGRAM(S): at 04:02

## 2022-01-01 RX ADMIN — HYDROMORPHONE HYDROCHLORIDE 0.5 MILLIGRAM(S): 2 INJECTION INTRAMUSCULAR; INTRAVENOUS; SUBCUTANEOUS at 12:21

## 2022-01-01 RX ADMIN — HYDROMORPHONE HYDROCHLORIDE 0.5 MILLIGRAM(S): 2 INJECTION INTRAMUSCULAR; INTRAVENOUS; SUBCUTANEOUS at 12:36

## 2022-01-01 RX ADMIN — FINASTERIDE 5 MILLIGRAM(S): 5 TABLET, FILM COATED ORAL at 12:00

## 2022-01-01 RX ADMIN — APIXABAN 2.5 MILLIGRAM(S): 2.5 TABLET, FILM COATED ORAL at 09:32

## 2022-01-01 RX ADMIN — SODIUM CHLORIDE 70 MILLILITER(S): 9 INJECTION, SOLUTION INTRAVENOUS at 18:01

## 2022-01-01 RX ADMIN — ATORVASTATIN CALCIUM 10 MILLIGRAM(S): 80 TABLET, FILM COATED ORAL at 22:28

## 2022-01-01 RX ADMIN — HYDROMORPHONE HYDROCHLORIDE 0.5 MILLIGRAM(S): 2 INJECTION INTRAMUSCULAR; INTRAVENOUS; SUBCUTANEOUS at 03:56

## 2022-01-01 RX ADMIN — Medication 3 MILLILITER(S): at 13:05

## 2022-01-01 RX ADMIN — FINASTERIDE 5 MILLIGRAM(S): 5 TABLET, FILM COATED ORAL at 12:33

## 2022-01-01 RX ADMIN — HYDROMORPHONE HYDROCHLORIDE 0.5 MILLIGRAM(S): 2 INJECTION INTRAMUSCULAR; INTRAVENOUS; SUBCUTANEOUS at 01:09

## 2022-01-01 RX ADMIN — QUETIAPINE FUMARATE 25 MILLIGRAM(S): 200 TABLET, FILM COATED ORAL at 22:28

## 2022-01-01 RX ADMIN — PANTOPRAZOLE SODIUM 40 MILLIGRAM(S): 20 TABLET, DELAYED RELEASE ORAL at 17:43

## 2022-01-01 RX ADMIN — FINASTERIDE 5 MILLIGRAM(S): 5 TABLET, FILM COATED ORAL at 11:20

## 2022-01-01 RX ADMIN — Medication 3 MILLILITER(S): at 06:02

## 2022-01-01 RX ADMIN — HYDROMORPHONE HYDROCHLORIDE 0.5 MILLIGRAM(S): 2 INJECTION INTRAMUSCULAR; INTRAVENOUS; SUBCUTANEOUS at 03:26

## 2022-01-01 RX ADMIN — PROTHROMBIN COMPLEX CONCENTRATE (HUMAN) 2000 INTERNATIONAL UNIT(S): 25.5; 16.5; 24; 22; 22; 26 POWDER, FOR SOLUTION INTRAVENOUS at 00:29

## 2022-01-01 RX ADMIN — POLYETHYLENE GLYCOL 3350 17 GRAM(S): 17 POWDER, FOR SOLUTION ORAL at 12:53

## 2022-01-01 RX ADMIN — SENNA PLUS 2 TABLET(S): 8.6 TABLET ORAL at 22:05

## 2022-01-01 RX ADMIN — Medication 100 MEQ/KG/HR: at 10:01

## 2022-01-01 RX ADMIN — HYDROMORPHONE HYDROCHLORIDE 0.5 MILLIGRAM(S): 2 INJECTION INTRAMUSCULAR; INTRAVENOUS; SUBCUTANEOUS at 10:37

## 2022-01-01 RX ADMIN — Medication 100 MEQ/KG/HR: at 21:20

## 2022-01-01 RX ADMIN — Medication 3 MILLILITER(S): at 17:32

## 2022-01-01 RX ADMIN — Medication 650 MILLIGRAM(S): at 17:43

## 2022-01-01 RX ADMIN — Medication 650 MILLIGRAM(S): at 11:15

## 2022-01-01 RX ADMIN — HYDROMORPHONE HYDROCHLORIDE 0.5 MILLIGRAM(S): 2 INJECTION INTRAMUSCULAR; INTRAVENOUS; SUBCUTANEOUS at 06:15

## 2022-01-01 RX ADMIN — FINASTERIDE 5 MILLIGRAM(S): 5 TABLET, FILM COATED ORAL at 11:51

## 2022-01-01 RX ADMIN — TAMSULOSIN HYDROCHLORIDE 0.4 MILLIGRAM(S): 0.4 CAPSULE ORAL at 22:05

## 2022-01-01 RX ADMIN — PANTOPRAZOLE SODIUM 40 MILLIGRAM(S): 20 TABLET, DELAYED RELEASE ORAL at 23:18

## 2022-01-01 RX ADMIN — ATORVASTATIN CALCIUM 10 MILLIGRAM(S): 80 TABLET, FILM COATED ORAL at 22:33

## 2022-01-01 RX ADMIN — APIXABAN 2.5 MILLIGRAM(S): 2.5 TABLET, FILM COATED ORAL at 10:01

## 2022-01-01 RX ADMIN — HYDROMORPHONE HYDROCHLORIDE 0.5 MILLIGRAM(S): 2 INJECTION INTRAMUSCULAR; INTRAVENOUS; SUBCUTANEOUS at 04:46

## 2022-01-01 RX ADMIN — Medication 650 MILLIGRAM(S): at 12:04

## 2022-01-01 RX ADMIN — PIPERACILLIN AND TAZOBACTAM 200 GRAM(S): 4; .5 INJECTION, POWDER, LYOPHILIZED, FOR SOLUTION INTRAVENOUS at 06:19

## 2022-01-01 RX ADMIN — PIPERACILLIN AND TAZOBACTAM 200 GRAM(S): 4; .5 INJECTION, POWDER, LYOPHILIZED, FOR SOLUTION INTRAVENOUS at 06:02

## 2022-01-01 RX ADMIN — Medication 3 MILLILITER(S): at 23:12

## 2022-01-01 RX ADMIN — PIPERACILLIN AND TAZOBACTAM 200 GRAM(S): 4; .5 INJECTION, POWDER, LYOPHILIZED, FOR SOLUTION INTRAVENOUS at 21:41

## 2022-01-01 RX ADMIN — ONDANSETRON 4 MILLIGRAM(S): 8 TABLET, FILM COATED ORAL at 20:33

## 2022-01-01 RX ADMIN — HYDROMORPHONE HYDROCHLORIDE 0.5 MILLIGRAM(S): 2 INJECTION INTRAMUSCULAR; INTRAVENOUS; SUBCUTANEOUS at 00:45

## 2022-01-01 RX ADMIN — Medication 3 MILLILITER(S): at 11:22

## 2022-01-01 RX ADMIN — PIPERACILLIN AND TAZOBACTAM 200 GRAM(S): 4; .5 INJECTION, POWDER, LYOPHILIZED, FOR SOLUTION INTRAVENOUS at 14:44

## 2022-01-01 RX ADMIN — ATORVASTATIN CALCIUM 10 MILLIGRAM(S): 80 TABLET, FILM COATED ORAL at 22:06

## 2022-01-01 RX ADMIN — Medication 3 MILLILITER(S): at 05:49

## 2022-01-01 RX ADMIN — HYDROMORPHONE HYDROCHLORIDE 0.5 MILLIGRAM(S): 2 INJECTION INTRAMUSCULAR; INTRAVENOUS; SUBCUTANEOUS at 16:45

## 2022-01-01 RX ADMIN — PIPERACILLIN AND TAZOBACTAM 200 GRAM(S): 4; .5 INJECTION, POWDER, LYOPHILIZED, FOR SOLUTION INTRAVENOUS at 22:53

## 2022-01-01 RX ADMIN — FINASTERIDE 5 MILLIGRAM(S): 5 TABLET, FILM COATED ORAL at 08:54

## 2022-01-01 RX ADMIN — Medication 3 MILLILITER(S): at 11:11

## 2022-01-01 RX ADMIN — Medication 40 MILLIEQUIVALENT(S): at 19:51

## 2022-01-01 RX ADMIN — Medication 145 MILLIGRAM(S): at 12:33

## 2022-01-01 RX ADMIN — Medication 1000 MILLIGRAM(S): at 20:10

## 2022-01-01 RX ADMIN — Medication 650 MILLIGRAM(S): at 01:52

## 2022-01-01 RX ADMIN — HYDROMORPHONE HYDROCHLORIDE 0.5 MILLIGRAM(S): 2 INJECTION INTRAMUSCULAR; INTRAVENOUS; SUBCUTANEOUS at 18:55

## 2022-01-01 RX ADMIN — HYDROMORPHONE HYDROCHLORIDE 0.5 MILLIGRAM(S): 2 INJECTION INTRAMUSCULAR; INTRAVENOUS; SUBCUTANEOUS at 04:31

## 2022-01-01 RX ADMIN — CEFTRIAXONE 100 MILLIGRAM(S): 500 INJECTION, POWDER, FOR SOLUTION INTRAMUSCULAR; INTRAVENOUS at 03:36

## 2022-01-01 RX ADMIN — HYDROMORPHONE HYDROCHLORIDE 0.5 MILLIGRAM(S): 2 INJECTION INTRAMUSCULAR; INTRAVENOUS; SUBCUTANEOUS at 18:14

## 2022-01-01 RX ADMIN — PIPERACILLIN AND TAZOBACTAM 200 GRAM(S): 4; .5 INJECTION, POWDER, LYOPHILIZED, FOR SOLUTION INTRAVENOUS at 22:13

## 2022-01-01 RX ADMIN — SERTRALINE 50 MILLIGRAM(S): 25 TABLET, FILM COATED ORAL at 11:51

## 2022-01-01 RX ADMIN — APIXABAN 2.5 MILLIGRAM(S): 2.5 TABLET, FILM COATED ORAL at 22:00

## 2022-01-01 RX ADMIN — Medication 145 MILLIGRAM(S): at 11:19

## 2022-01-01 RX ADMIN — Medication 650 MILLIGRAM(S): at 06:45

## 2022-01-01 RX ADMIN — APIXABAN 2.5 MILLIGRAM(S): 2.5 TABLET, FILM COATED ORAL at 09:14

## 2022-01-01 RX ADMIN — Medication 3 MILLILITER(S): at 00:50

## 2022-01-01 RX ADMIN — PANTOPRAZOLE SODIUM 10 MG/HR: 20 TABLET, DELAYED RELEASE ORAL at 10:48

## 2022-01-01 RX ADMIN — SERTRALINE 50 MILLIGRAM(S): 25 TABLET, FILM COATED ORAL at 09:33

## 2022-01-01 RX ADMIN — SODIUM CHLORIDE 60 MILLILITER(S): 9 INJECTION, SOLUTION INTRAVENOUS at 08:49

## 2022-01-01 RX ADMIN — Medication 3 MILLILITER(S): at 12:08

## 2022-01-01 RX ADMIN — FINASTERIDE 5 MILLIGRAM(S): 5 TABLET, FILM COATED ORAL at 09:14

## 2022-01-01 RX ADMIN — SODIUM CHLORIDE 1000 MILLILITER(S): 9 INJECTION INTRAMUSCULAR; INTRAVENOUS; SUBCUTANEOUS at 03:36

## 2022-01-01 RX ADMIN — SERTRALINE 50 MILLIGRAM(S): 25 TABLET, FILM COATED ORAL at 09:14

## 2022-01-01 RX ADMIN — PANTOPRAZOLE SODIUM 40 MILLIGRAM(S): 20 TABLET, DELAYED RELEASE ORAL at 06:45

## 2022-01-01 RX ADMIN — Medication 250 MILLIGRAM(S): at 14:39

## 2022-01-01 RX ADMIN — PANTOPRAZOLE SODIUM 40 MILLIGRAM(S): 20 TABLET, DELAYED RELEASE ORAL at 18:12

## 2022-01-01 RX ADMIN — TAMSULOSIN HYDROCHLORIDE 0.4 MILLIGRAM(S): 0.4 CAPSULE ORAL at 21:41

## 2022-01-01 RX ADMIN — SODIUM CHLORIDE 1000 MILLILITER(S): 9 INJECTION INTRAMUSCULAR; INTRAVENOUS; SUBCUTANEOUS at 21:59

## 2022-01-01 RX ADMIN — FINASTERIDE 5 MILLIGRAM(S): 5 TABLET, FILM COATED ORAL at 09:32

## 2022-01-01 RX ADMIN — POLYETHYLENE GLYCOL 3350 17 GRAM(S): 17 POWDER, FOR SOLUTION ORAL at 17:42

## 2022-01-01 RX ADMIN — HYDROMORPHONE HYDROCHLORIDE 0.5 MILLIGRAM(S): 2 INJECTION INTRAMUSCULAR; INTRAVENOUS; SUBCUTANEOUS at 19:22

## 2022-01-01 RX ADMIN — PANTOPRAZOLE SODIUM 40 MILLIGRAM(S): 20 TABLET, DELAYED RELEASE ORAL at 17:32

## 2022-01-01 RX ADMIN — FINASTERIDE 5 MILLIGRAM(S): 5 TABLET, FILM COATED ORAL at 10:01

## 2022-01-01 RX ADMIN — SODIUM CHLORIDE 75 MILLILITER(S): 9 INJECTION, SOLUTION INTRAVENOUS at 22:03

## 2022-01-01 RX ADMIN — HYDROMORPHONE HYDROCHLORIDE 0.5 MILLIGRAM(S): 2 INJECTION INTRAMUSCULAR; INTRAVENOUS; SUBCUTANEOUS at 03:45

## 2022-01-01 RX ADMIN — SERTRALINE 50 MILLIGRAM(S): 25 TABLET, FILM COATED ORAL at 10:02

## 2022-01-01 RX ADMIN — Medication 3 MILLILITER(S): at 01:00

## 2022-01-01 RX ADMIN — POLYETHYLENE GLYCOL 3350 17 GRAM(S): 17 POWDER, FOR SOLUTION ORAL at 09:13

## 2022-01-01 RX ADMIN — PIPERACILLIN AND TAZOBACTAM 200 GRAM(S): 4; .5 INJECTION, POWDER, LYOPHILIZED, FOR SOLUTION INTRAVENOUS at 22:07

## 2022-01-01 RX ADMIN — Medication 3 MILLILITER(S): at 18:09

## 2022-01-01 RX ADMIN — Medication 3 MILLILITER(S): at 17:13

## 2022-01-01 RX ADMIN — PIPERACILLIN AND TAZOBACTAM 200 GRAM(S): 4; .5 INJECTION, POWDER, LYOPHILIZED, FOR SOLUTION INTRAVENOUS at 06:31

## 2022-01-01 RX ADMIN — Medication 20 MILLIEQUIVALENT(S): at 09:14

## 2022-01-01 RX ADMIN — PIPERACILLIN AND TAZOBACTAM 200 GRAM(S): 4; .5 INJECTION, POWDER, LYOPHILIZED, FOR SOLUTION INTRAVENOUS at 06:11

## 2022-01-01 RX ADMIN — SODIUM CHLORIDE 50 MILLILITER(S): 9 INJECTION, SOLUTION INTRAVENOUS at 20:19

## 2022-01-01 RX ADMIN — SERTRALINE 50 MILLIGRAM(S): 25 TABLET, FILM COATED ORAL at 11:20

## 2022-01-01 RX ADMIN — PIPERACILLIN AND TAZOBACTAM 200 GRAM(S): 4; .5 INJECTION, POWDER, LYOPHILIZED, FOR SOLUTION INTRAVENOUS at 22:32

## 2022-01-01 RX ADMIN — Medication 0.5 MILLIGRAM(S): at 04:14

## 2022-01-01 RX ADMIN — HYDROMORPHONE HYDROCHLORIDE 0.5 MILLIGRAM(S): 2 INJECTION INTRAMUSCULAR; INTRAVENOUS; SUBCUTANEOUS at 10:50

## 2022-01-01 RX ADMIN — APIXABAN 2.5 MILLIGRAM(S): 2.5 TABLET, FILM COATED ORAL at 21:41

## 2022-01-01 RX ADMIN — FINASTERIDE 5 MILLIGRAM(S): 5 TABLET, FILM COATED ORAL at 10:40

## 2022-01-01 RX ADMIN — SODIUM CHLORIDE 500 MILLILITER(S): 9 INJECTION, SOLUTION INTRAVENOUS at 20:19

## 2022-01-01 RX ADMIN — APIXABAN 2.5 MILLIGRAM(S): 2.5 TABLET, FILM COATED ORAL at 12:01

## 2022-01-01 RX ADMIN — SENNA PLUS 2 TABLET(S): 8.6 TABLET ORAL at 22:13

## 2022-01-01 RX ADMIN — APIXABAN 2.5 MILLIGRAM(S): 2.5 TABLET, FILM COATED ORAL at 21:32

## 2022-01-01 RX ADMIN — PIPERACILLIN AND TAZOBACTAM 200 GRAM(S): 4; .5 INJECTION, POWDER, LYOPHILIZED, FOR SOLUTION INTRAVENOUS at 05:46

## 2022-01-01 RX ADMIN — Medication 650 MILLIGRAM(S): at 18:18

## 2022-01-01 RX ADMIN — SERTRALINE 50 MILLIGRAM(S): 25 TABLET, FILM COATED ORAL at 09:15

## 2022-01-01 RX ADMIN — Medication 650 MILLIGRAM(S): at 12:34

## 2022-01-01 RX ADMIN — HYDROMORPHONE HYDROCHLORIDE 0.5 MILLIGRAM(S): 2 INJECTION INTRAMUSCULAR; INTRAVENOUS; SUBCUTANEOUS at 00:27

## 2022-01-01 RX ADMIN — TAMSULOSIN HYDROCHLORIDE 0.4 MILLIGRAM(S): 0.4 CAPSULE ORAL at 23:14

## 2022-01-01 RX ADMIN — SODIUM CHLORIDE 70 MILLILITER(S): 9 INJECTION, SOLUTION INTRAVENOUS at 12:45

## 2022-01-01 RX ADMIN — POLYETHYLENE GLYCOL 3350 17 GRAM(S): 17 POWDER, FOR SOLUTION ORAL at 11:59

## 2022-01-01 RX ADMIN — PIPERACILLIN AND TAZOBACTAM 200 GRAM(S): 4; .5 INJECTION, POWDER, LYOPHILIZED, FOR SOLUTION INTRAVENOUS at 12:05

## 2022-01-01 RX ADMIN — SERTRALINE 50 MILLIGRAM(S): 25 TABLET, FILM COATED ORAL at 08:54

## 2022-01-01 RX ADMIN — PROTHROMBIN COMPLEX CONCENTRATE (HUMAN) 533.33 INTERNATIONAL UNIT(S): 25.5; 16.5; 24; 22; 22; 26 POWDER, FOR SOLUTION INTRAVENOUS at 00:20

## 2022-01-01 RX ADMIN — APIXABAN 2.5 MILLIGRAM(S): 2.5 TABLET, FILM COATED ORAL at 08:54

## 2022-01-01 RX ADMIN — Medication 145 MILLIGRAM(S): at 11:52

## 2022-01-01 RX ADMIN — SENNA PLUS 2 TABLET(S): 8.6 TABLET ORAL at 22:07

## 2022-01-01 RX ADMIN — HYDROMORPHONE HYDROCHLORIDE 0.5 MILLIGRAM(S): 2 INJECTION INTRAMUSCULAR; INTRAVENOUS; SUBCUTANEOUS at 14:12

## 2022-01-01 RX ADMIN — Medication 3 MILLILITER(S): at 06:19

## 2022-01-01 RX ADMIN — SODIUM CHLORIDE 1000 MILLILITER(S): 9 INJECTION INTRAMUSCULAR; INTRAVENOUS; SUBCUTANEOUS at 07:20

## 2022-08-16 NOTE — ED PROVIDER NOTE - PHYSICAL EXAMINATION
CONST: tired appearing NAD speaking in full sentences  HEAD: atraumatic  EYES: conjunctivae clear, PERRL, EOMI  ENT: dried brown vomitus around mouth, tacky mucous membranes  NECK: supple/FROM  CARD: rrr no murmurs  CHEST: +bl rales/rhonchi, no wheezing/stridor/retractions/tripoding  ABD: soft, nd, nttp, no rebound/guarding  : no rash  EXT: FROM, symmetric distal pulses intact  SKIN: warm, dry, no rash, no pedal edema/ttp/rash, cap refill <2sec  NEURO: a+ox1, 4/5 strength x4, gross sensation intact x4

## 2022-08-16 NOTE — ED PROVIDER NOTE - NS_BEDUNITTYPES_ED_ALL_ED
pt for d/c to home today   TN spoke with Aki with [employer] -- he will meet with pt today and contact [facility] to resume HH       Future Appointments   Date Time Provider Department Center   2/11/2019  1:00 PM Salo Nuñez MD [facility] GASTRO [facility]   3/7/2019 10:15 AM oKry Jack MD [facility] UROLOGY [facility]        REGIONAL

## 2022-08-16 NOTE — ED PROVIDER NOTE - OBJECTIVE STATEMENT
89M tia, restrictive CM, chf, htn, hld, ckd, bph, urinary retention, limited mobility, biba from [facility] c/o "resp failure, hypotension, r/o sepsis." bp100/60 hr85 ox95% on 4L nc. pt c/o numerous nbnb emesis "too many to count" and subsequent cough/sob. no fever/chills, no ha/dizziness, no uri/cough, no cp, no abd pain, no diarrhea, no dysuria, no rash, no trauma.    hcp/daughter: brina farris [phone]

## 2022-08-16 NOTE — ED ADULT NURSE NOTE - CAPILLARY REFILL
Patient: An Abreu    Procedure Summary     Date: 06/19/21 Room / Location:  [facility] /  [facility]    Anesthesia Start: 1326 Anesthesia Stop: 1408    Procedure: COLONOSCOPY (N/A ) Diagnosis:     Surgeons: Wilfredo Simon MD Provider: Jose Alfredo Pena Jr., MD    Anesthesia Type: general ASA Status: 3          Anesthesia Type: general    Vitals  No vitals data found for the desired time range.          Post Anesthesia Care and Evaluation    Patient location during evaluation: ICU (Handoff to ICU RN)  Patient participation: complete - patient participated  Level of consciousness: awake and alert  Pain management: adequate  Airway patency: patent  Anesthetic complications: No anesthetic complications  PONV Status: none  Cardiovascular status: hemodynamically stable and acceptable  Respiratory status: nonlabored ventilation, acceptable and nasal cannula  Hydration status: acceptable    Comments: Pt awake and alert prior to ICU transport. On 4L/min O2 by NC      
2 seconds or less

## 2022-08-16 NOTE — ED ADULT TRIAGE NOTE - CHIEF COMPLAINT QUOTE
Patient sent from [facility] for hypotension and shortness of breath

## 2022-08-16 NOTE — ED ADULT NURSE NOTE - OBJECTIVE STATEMENT
Patient BIBEMS from nursing home c/o generalized weakness and confusion. EMS reports "his neighbor says he yells all night long." Patient reports feeling weak "everywhere"-- denies CP. Actively vomiting upon arrival. Patient speaking in full sentences. Able to state correct city, name, . Patient unable to state when symptoms began.

## 2022-08-16 NOTE — ED PROVIDER NOTE - CADM POA CENTRAL LINE
Pt repeat VBG lactate result 8.4 down from 9.5.  ED MD Escalante made aware.  No further orders at this time, pt rcving IVF hydration. No

## 2022-08-16 NOTE — ED ADULT NURSE REASSESSMENT NOTE - NS ED NURSE REASSESS COMMENT FT1
pt received from day shift RN. as per report pt sent from NH for vomiting and r/o PNA. Pt is A&O to person and place - @ baseline mental status pt is confused. at this time pt pending lab results - straight cath attempted however RN unable to advance catheter. MD made aware and external urinary catheter placed at this time. pt otherwise does not appear in distress, maintained on NC

## 2022-08-16 NOTE — ED ADULT NURSE NOTE - INTERVENTIONS DEFINITIONS
[place] to call system/Call bell, personal items and telephone within reach/Instruct patient to call for assistance/Room bathroom lighting operational/Non-slip footwear when patient is off stretcher/Physically safe environment: no spills, clutter or unnecessary equipment/Stretcher in lowest position, wheels locked, appropriate side rails in place/Provide visual cue, wrist band, yellow gown, etc./Monitor gait and stability/Monitor for mental status changes and reorient to person, place, and time/Review medications for side effects contributing to fall risk/Reinforce activity limits and safety measures with patient and family/Provide visual clues: red socks

## 2022-08-16 NOTE — ED PROVIDER NOTE - CLINICAL SUMMARY MEDICAL DECISION MAKING FREE TEXT BOX
avss. no active cp. no acute resp distress. found to have leukocytosis likely 2/2 aspiration pna on ct chest. lactate wnl. s/p abx/ivf. bcx pending. covid/rvp neg. no significant anemia vs electrolyte abnl. bnp 200s but no pulm edema on ct chest. trop 0.22>0.20 in setting of cr 4s (?baseline). ekg w/o significant st/t changes. ct a/p w/o acute abnl. daughter updated. see progress note. will admit.

## 2022-08-17 NOTE — CHART NOTE - NSCHARTNOTEFT_GEN_A_CORE
Patient refused rosenbaum catheter. Explained to patient primary team would like to obtain sample and due to elevated bladder scans. Patient continuously refused.     Primary team paged twice.

## 2022-08-17 NOTE — DIETITIAN INITIAL EVALUATION ADULT - OTHER CALCULATIONS
Based on Standards of Care pt within % IBW (IBW is 172#) thus actual body weight used for all calculations. Needs adjusted for advanced age, pressure injuries and protein was slightly (not fully) increased r/t CKD status. Fluid recs per team.

## 2022-08-17 NOTE — H&P ADULT - PROBLEM SELECTOR PLAN 11
F: s/p 500cc NS  E: caution with repletion given renal failure  N: pending S&S  GI: none  DVT: Heparin   Code: Full  Dispo: [id number] Patient with atrial fibrillation, home medication Eliquis 2.5 mg BID.   - Continue home medication

## 2022-08-17 NOTE — PROGRESS NOTE ADULT - ASSESSMENT
ASSESSMENT/PLAN89 y/o male PMH TIA, restrictive CM, CHF, HTN, HLD, Afib (on Eliquis), CKD (baseline Scr 2.0), BPH, urinary retention, limited mobility, brought from [facility] for oconcern of vomiting, hypoxia, sob, hypotension likely due to aspiration pneumonitis vs UTI, found to have SADIQ on admission.    1. O2 4LNC at this time  2. Bronchodilators:  Atrovent/ albuterol q 4 – 6 hours as needed  3. Corticosteroids: off  4. ID/Antibiotics: on Rocephin  5. Cardiac/HTN: optimize BP   6. GI: Rx/ prophylaxis c PPI/H2B  7. Heme: Rx/VT prophylaxis c SQH/SCD/AC pt on Eliquis  8. Aspiration precautions, Speech and Swallow evaluation  Discussed with managing team,

## 2022-08-17 NOTE — SWALLOW BEDSIDE ASSESSMENT ADULT - SWALLOW EVAL: RECOMMENDED DIET
Diet recommendations should be based on overall goals of care. If goals of care are to prevent prandial aspiration, recommend an instrumental assessment  - MBS to further assess swallow function. If goals of care are to provide comfort feeds and accept aspiration risks and their potential adverse effects, recommend soft and bite sized with thin liquids

## 2022-08-17 NOTE — H&P ADULT - PROBLEM SELECTOR PLAN 6
Patient with hypertension, home medications Coreg 3.125mg BID, Enalapril 5mg qd, Lasix 20mg qd, Hydralazine 50mg TID.  - Hold medications for now since patient was hypotensive Hgb on admission 9.4 (baseline 8) normocytic, likely GM vs AOCD in setting of renal failure. Currently no signs of active bleeding (no hematochezia, melena, hemoptysis, hematuria)  - obtain iron panel  - trend CBC  - maintain active T&S  - transfuse if Hgb <7 Patient with history of BPH complicated by urinary retention, home medications Finasteride 5mg qd, and Tamsulosin 0.4mg qd.   - Continue home medications

## 2022-08-17 NOTE — PATIENT PROFILE ADULT - FALL HARM RISK - HARM RISK INTERVENTIONS
Assistance with ambulation/Assistance OOB with selected safe patient handling equipment/Communicate Risk of Fall with Harm to all staff/Discuss with provider need for PT consult/Monitor gait and stability/Reinforce activity limits and safety measures with patient and family/Tailored Fall Risk Interventions/Visual Cue: Yellow wristband and red socks/Bed in lowest position, wheels locked, appropriate side rails in place/Call bell, personal items and telephone in reach/Instruct patient to call for assistance before getting out of bed or chair/Non-slip footwear when patient is out of bed/[place] to call system/Physically safe environment - no spills, clutter or unnecessary equipment/Purposeful Proactive Rounding/Room/bathroom lighting operational, light cord in reach

## 2022-08-17 NOTE — H&P ADULT - PROBLEM SELECTOR PLAN 1
Patient with recent cough, sob[facility] CTA with evidence of aspiration pneumonitis, likely in setting of vomiting which was most likely due to uremia. Patient currently continues to cough but no longer hypoxic   - Continue Zosyn  - S&S eval in AM Patient with recent cough, sob, hypoxia at [facility], CTAP with evidence of aspiration pneumonitis, likely in setting of vomiting which was most likely due to uremia. Patient currently continues to cough but no longer hypoxic   - Continue Zosyn  - S&S eval in AM  - F/u Bcx Patient meeting two SIRS critera HR 93 WBC 13.88 on admission possible sources aspiration pneumonitis and UTI.  Patient has UCx positive for the kleb pnuemo on 8/3 and was being treated with Levaquin since 8/13.  Abdominal source unlikely patient without diarrhea, no evidence of infection on CTAP.  Patient remains afebrile, hemodynamically stable   - F/u Bcx, Ucx, procal  - Monitor off antibiotics for pneumonitis for now  - Continue Levaquin for total 7-day course started 7/13 (pending passing S&S) Patient meeting two SIRS critera HR 93 WBC 13.88 on admission possible sources aspiration pneumonitis and UTI.  Patient has UCx positive for the kleb pnuemo on 8/3 and was being treated with Levaquin since 8/13.  Abdominal source unlikely patient without diarrhea, no evidence of infection on CTAP.  Patient remains afebrile, hemodynamically stable   - F/u Bcx, Ucx, procal  - Monitor off antibiotics for pneumonitis for now  - Continue Levaquin for total 7-day course started 7/13-7/19 (pending passing S&S)
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
## 2022-08-17 NOTE — CONSULT NOTE ADULT - PROBLEM SELECTOR RECOMMENDATION 2
Patient is a high risk of aspiration per speech and swallow evaluation and patient is recommended for MBS. Family would not like a feeding tube for their father.

## 2022-08-17 NOTE — H&P ADULT - PROBLEM SELECTOR PLAN 8
Patient with atrial fibrillation, home medication Eliquis 2.5 mg BID.   - Continue home medication Patient with hypertension, home medications Coreg 3.125mg BID, Enalapril 5mg qd, Lasix 20mg qd, Hydralazine 50mg TID.  - Hold medications for now since patient was hypotensive Patient with CHF, no previous echo, BNP on admission 2189, unclear if ischemic or nonischemic etiology but patient is documented to have restrictive cardiomyopathy  Home medications Coreg 3.125mg BID, Enalapril 5mg qd, Lasix 20mg qd, Hydralazine 50mg TID.  Patient clinically euvolemic on exam.  - Hold medications for now since patient was hypotensive and euvolemic, can restart one at a time  - TTE in AM  - strict I/Os, monitor UOP carefully  - daily weights

## 2022-08-17 NOTE — SWALLOW BEDSIDE ASSESSMENT ADULT - NS SPL SWALLOW CLINIC TRIAL FT
Oral stage is significant for prolonged bolus formation, manipulation and transport. Pharyngeal stage is significant for wet cough throughout assessment which can indicate airway protection deficits. However,  given baseline cough, clinical exam is limited. Further management should be based on overall goals of care. If goals of care are to eliminate aspiration, consider instrumental assessment - Modified Barium Swallow Study. Palliative care is involved and will clarify goals with family. This service will continue to follow.

## 2022-08-17 NOTE — DIETITIAN INITIAL EVALUATION ADULT - OTHER INFO
89 y/o male PMH TIA, restrictive CM, CHF, HTN, HLD, Afib (on Eliquis), CKD (baseline Scr 2.0), BPH, urinary retention, limited mobility, brought from [facility] for concern of vomiting, hypoxia, sob, hypotension. Patient not providing history, obtained from chart and ED provider.  Patient with several episodes of NBNB vomiting since yesterday wich subsequently onset of shortness of breath and cough which has been progressively working. At baseline patient does not use any supplemental oxygen but was requiring 4L NC and was hypotensive to 100/60. Per records from [facility] no recent fever, chills, rhinorrhea, chest pain, palpitations, headaches, abdominal pain, diarrhea, constipation, dysuria, urgency, frequency.    Pt seen on 4UR at bedside. Nutrition information obtained has been limited r/t Pt stated UBW ~ pounds, which is/is not consistent with wt upon admission (~ pounds). Appetite currently blank per pt; PTA, appetite was blank per pt. As per diet/24h recall, PTA pt consumed blank meals per day, which consisted of blank. During current admission, consumption of <blank% meals on average as noted per pt. Pt stated he enjoys blank. GI: s/s within normal limits at this time per flowsheets data. Curly: 13. Skin integrity: stage III pressure injury to sacrum, R ankle stage III pressure injury. No edema noted. Pain level is 5 per flowsheets data. NKFA. Pertinent lab values: low H&H, elevated BUN, elevated Creat, elevated glucose, elevated AST, low eGFR, elevated troponin; will monitor. Pt is currently NPO status. Pt is receiving IV Abx. No visual s/s of malnutrition upon visual inspection by RD. RD will remain available. Additional nutrition recommendations listed below to follow.  89 y/o male PMH TIA, restrictive CM, CHF, HTN, HLD, Afib (on Eliquis), CKD (baseline Scr 2.0), BPH, urinary retention, limited mobility, brought from [facility] for concern of vomiting, hypoxia, sob, hypotension. Patient not providing history, obtained from chart and ED provider.  Patient with several episodes of NBNB vomiting since yesterday wich subsequently onset of shortness of breath and cough which has been progressively working. At baseline patient does not use any supplemental oxygen but was requiring 4L NC and was hypotensive to 100/60. Per records from [facility] no recent fever, chills, rhinorrhea, chest pain, palpitations, headaches, abdominal pain, diarrhea, constipation, dysuria, urgency, frequency.    Pt seen on 4UR at bedside. Nutrition information obtained has been limited by pt; RD has obtained other information from EMR. RD left a voicemail for pt's emergency contact Celina. Pt stated UBW unknown, but he believes he has gained some wt. Wt upon admission was ~150#; IBW ~172#; ht ~5'11" per pt. Appetite currently poor per pt; PTA, appetite was fair per pt. As per diet/24h recall, PTA pt consumed variety of foods, but pt was unable to express further details. During current admission, pt is NPO status, thus pt is consuming 0% PO intakes. Pt stated he enjoys steak. GI: s/s within normal limits at this time per flowsheets data. Pt endorsed he does not recall his last BM; RD will f/u with team. Curly: 13. Skin integrity: stage III pressure injury to sacrum, R ankle stage III pressure injury. No edema noted. Pain level is 5 per flowsheets data. NKFA. Pertinent lab values: low H&H, elevated BUN, elevated Creat, elevated glucose, elevated AST, low eGFR, elevated troponin; will monitor. Pt is currently NPO status. Pt is receiving IV Abx. Partial nutrition focused physical exam conducted, pt did not want RD to conduct full exam; no s/s of malnutrition observed at this time by RD. Education deferred at this time. RD will remain available. Additional nutrition recommendations listed below to follow.

## 2022-08-17 NOTE — H&P ADULT - PROBLEM SELECTOR PLAN 7
Patient with hyperlipidemia, home medication Lipitor 10mg qd.   - Continue home medication Patient with CHF, no previous echo, BNP on admission 2189, unclear if ischemic or nonischemic etiology but patient is documented to have restrictive cardiomyopathy  Home medications Coreg 3.125mg BID, Enalapril 5mg qd, Lasix 20mg qd, Hydralazine 50mg TID.  Patient clinically euvolemic on exam.  - Hold medications for now since patient was hypotensive and euvolemic, can restart one at a time  - TTE in AM  - strict I/Os, monitor UOP carefully  - daily weights Hgb on admission 9.4 (baseline 8) normocytic, likely GM vs AOCD in setting of renal failure. Currently no signs of active bleeding (no hematochezia, melena, hemoptysis, hematuria)  - obtain iron panel  - trend CBC  - maintain active T&S  - transfuse if Hgb <7

## 2022-08-17 NOTE — CONSULT NOTE ADULT - ASSESSMENT
89 year old man with debility, dysphagia, pneumonia and encounter for palliative care.  89 year old man with debility, dysphagia, pneumonia, advance care planning and encounter for palliative care.

## 2022-08-17 NOTE — DIETITIAN INITIAL EVALUATION ADULT - PERTINENT MEDS FT
MEDICATIONS  (STANDING):  albuterol/ipratropium for Nebulization 3 milliLiter(s) Nebulizer every 6 hours  apixaban 2.5 milliGRAM(s) Oral every 12 hours  atorvastatin 10 milliGRAM(s) Oral at bedtime  finasteride 5 milliGRAM(s) Oral every 24 hours  sertraline 50 milliGRAM(s) Oral every 24 hours  tamsulosin 0.4 milliGRAM(s) Oral at bedtime    MEDICATIONS  (PRN):  
Mikey Siegel
major surgery lasting over 3 hrs

## 2022-08-17 NOTE — H&P ADULT - NSHPPHYSICALEXAM_GEN_ALL_CORE
VITALS:   T(C): 37.1 (08-16-22 @ 20:22), Max: 37.1 (08-16-22 @ 20:22)  HR: 80 (08-17-22 @ 02:33) (70 - 93)  BP: 112/53 (08-17-22 @ 02:33) (101/64 - 112/53)  RR: 20 (08-17-22 @ 02:33) (18 - 22)  SpO2: 95% (08-17-22 @ 02:33) (95% - 96%)    GENERAL: no acute distress, elderly man lying in bed comfortably  HEAD:  Atraumatic, normocephalic  EYES: PERRLA, conjunctiva and sclera clear  ENT: dry mucous membranes  NECK: Supple, no JVD  HEART: Regular rate and rhythm, no murmurs, rubs, or gallops  LUNGS: Unlabored respirations. Bilateral rhonci  ABDOMEN: Soft, nontender, supra-pubic distension, +BS  EXTREMITIES: 2+ peripheral pulses bilaterally. No clubbing, cyanosis, or edema  NERVOUS SYSTEM:  A&Ox1, exam not performed patient not participating  SKIN: No rashes or lesions

## 2022-08-17 NOTE — H&P ADULT - PROBLEM SELECTOR PLAN 10
Patient with atrial fibrillation, home medication Eliquis 2.5 mg BID.   - Continue home medication Patient with hyperlipidemia, home medication Lipitor 10mg qd.   - Continue home medication

## 2022-08-17 NOTE — CONSULT NOTE ADULT - CONVERSATION DETAILS
In addition to the EM visit, an advance care planning meeting was performed  Start time: 1115am  End time: 1145am  Total time: 30 minutes   A telephone meeting to discuss advance care planning was held today regarding: JOHN LIMON  Primary decision maker: Patient is not able to make decision for himself  Alternate/surrogate: Emerita and Celina (daughters)  Discussed advance directives including, but not limited to, healthcare proxy and code status.  Decision regarding code status: DNR/DNI  Documentation completed today: MOLST    Discussion had with patients daughters separately over the phone regarding there fathers overall condition. They stated that at baseline he is usually alert and oriented , explained that currently he is aspirating, but unclear of source. They would like to get an MBS done to evaluate further but would not want feeding tube. They would also like him to be comfortable and do not want resuscitation or intubation. MOLST filled out and placed in chart. Emotional support was provided and daughters plan to come visit on Sunday.

## 2022-08-17 NOTE — H&P ADULT - PROBLEM SELECTOR PLAN 2
SADIQ on admission, BUN/Scr 98/4.26 (baseline Scr 2), likely postrenal given patient's history of urinary retention vs prerenal in setting of poor po intake and hypovolemia since patient is on Lasix.    - Nephrology consult in AM  - bladder scan  - trend Cr, avoid nephrotoxic drugs, renally dose meds  - obtain ulytes SADIQ on admission, BUN/Scr 98/4.26 (baseline Scr 2), likely postrenal given patient's history of urinary retention vs prerenal in setting of poor po intake and hypovolemia since patient is on Lasix vs recent UTI (patient was being treated with Levaquin in [facility] for reported UTI for 7 days starting 8.13 UCx from 8/3 with kleb pneumo).    - Nephrology consult in AM  - bladder scan  - trend Cr, avoid nephrotoxic drugs, renally dose meds  - obtain ulytes  - F/u UA and Ucxm if UTI start treatment Patient with nausea and vomiting for two days, possibly in setting of uremia or UTI.  Other differential include viral gastroenteritis, no evidence of colitis or most severe GI infection on imaging and no reported diarrhea.  Cardiac etiology unlikely, patient without symptoms, no ischemia on EKG, trop likely up in setting of renal failure now peaked.  - Continue Zofran PRN vomiting   - F/u CTH

## 2022-08-17 NOTE — H&P ADULT - NSICDXPASTMEDICALHX_GEN_ALL_CORE_FT
PAST MEDICAL HISTORY:  BPH (benign prostatic hyperplasia)     Chronic atrial fibrillation     Chronic CHF     Chronic kidney disease (CKD)     Hyperlipidemia     Hypertension

## 2022-08-17 NOTE — DIETITIAN INITIAL EVALUATION ADULT - NUTRITIONGOAL OUTCOME1
Pt to consistently meet at least 75% of EEE via tolerated route that is consistent with GOC; pt will exhibit healthy wound healing

## 2022-08-17 NOTE — H&P ADULT - PROBLEM SELECTOR PLAN 5
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  Patient clinically euvolemic on exam.  - Hold medications for now since patient was hypotensive and euvolemic, can restart one at a time  - TTE in AM  - strict I/Os, monitor UOP carefully  - daily weights Patient with history of BPH complicated by urinary retention, home medications Finasteride 5mg qd, and Tamsulosin 0.4mg qd.   - Continue home medications SADIQ on admission, BUN/Scr 98/4.26 (baseline Scr 2), likely postrenal given patient's history of urinary retention vs prerenal in setting of poor po intake and hypovolemia since patient is on Lasix vs recent UTI (patient was being treated with Levaquin in NH for reported UTI for 7 days starting 8.13 UCx from 8/3 with kleb pneumo).    - Nephrology consult in AM  - bladder scan  - trend Cr, avoid nephrotoxic drugs, renally dose meds  - obtain ulytes  - F/u UA and Ucxm if UTI start treatment

## 2022-08-17 NOTE — H&P ADULT - PROBLEM SELECTOR PLAN 12
F: s/p 500cc NS  E: caution with repletion given renal failure  N: pending S&S  GI: none  DVT: eliquis  Code: Full  Dispo: [id number]

## 2022-08-17 NOTE — CONSULT NOTE ADULT - PROBLEM SELECTOR RECOMMENDATION 5
Patient was made a DNR/DNI by his daughters. MOLST placed in chart. Please see [facility] documentation for details.  As discussed during the palliative IDT meeting, the patients PSSA screening did not identify any current psychosocial need or spiritual support deficits.  - Baptism/Spiritual practice: unknowns   - Coping: [ ] well [ ] with difficulty [ ] poor coping  [x] unable to obtain given AMS   - Support system: [ ] strong [x ] adequate [ ] inadequate  - All questions answered, emotional support provided  -  primary team   - Please contact Palliative Medicine 24/7 at [phone] for any acute symptoms or further questions  - Will continue to follow with you

## 2022-08-17 NOTE — CHART NOTE - NSCHARTNOTEFT_GEN_A_CORE
PALLIATIVE MEDICINE COORDINATION OF CARE NOTE FOR JOHN LIMON  [  ] ED Trigger   [  ] MICU Trigger     [ X ] Consult    [  ] AI Comanagement    Never seen by palliative in the past.    ___30___ Minutes; Start: _0700am____  End: __0730am__, of non-face-to-face prolonged service provided that relates to (face-to-face) care that has or will occur and ongoing patient management, including one or more of the following:   - Reviewed records from other physicians or other health care professional services, including one or more of the following: other medical records and diagnostic / radiology study results     HPI:  89 y/o male PMH TIA, restrictive CM, CHF, HTN, HLD, Afib (on Eliquis), CKD (baseline Scr 2.0), BPH, urinary retention, limited mobility, brought from [facility] for concern of vomiting, hypoxia, sob, hypotension. Patient not providing history, obtained from chart and ED provider.  Patient with several episodes of NBNB vomiting since yesterday wich subsequently onset of shortness of breath and cough which has been progressively working. At baseline patient does not use any supplemental oxygen but was requiring 4L NC and was hypotensive to 100/60. Per records from [facility] no recent fever, chills, rhinorrhea, chest pain, palpitations, headaches, abdominal pain, diarrhea, constipation, dysuria, urgency, frequency.  HCP/daughter: Celina Bass [phone]    Hospital Course:  Vitals: T 97.7, HR 93, /64, RR 22, SaO2 96% 4L   Labs: WBC 13.88, Hgb 9.4, CO2 20, BUN 98, Scr 4.26, Albumin 3.0, AST 78, , Trop 0.22 --> 0.20, BNP 2189  CT Chest: 1. Possible aspiration pneumonitis worse on the right. 2. Old granulomatous disease. Mild atelectasis in the right lung base. Diffuse areas of subtle ground-glass mostly in the right lung possibly an aspiration pneumonitis given the history. Peripheral interstitial opacities possibly chronic fibrosis.  CTAP:  Multiple hepatic cysts. Gallstones with no definite evidence of acute cholecystitis. Bilateral renal cysts. Mild prominence of the right renal collecting system without obstructing stone. Colonic and duodenal diverticulosis.    EKG: sinus tachycardia with 1st degree heart block prolonged QRS  Intervention: Zofran 4mg IV, NS 1L, CTX 1g, Flagyl 500mg   Consult: none  (17 Aug 2022 04:13)      - Other: [facility] reviewed.    Rx found on iStop review. Ref #: [id number]    - Other: Medication reviewed.    The patient HAS NOT used PRN's in the last 24h.    MEDICATIONS  (STANDING):  albuterol/ipratropium for Nebulization 3 milliLiter(s) Nebulizer every 6 hours  apixaban 2.5 milliGRAM(s) Oral every 12 hours  atorvastatin 10 milliGRAM(s) Oral at bedtime  cefTRIAXone   IVPB 1000 milliGRAM(s) IV Intermittent once  finasteride 5 milliGRAM(s) Oral every 24 hours  sertraline 50 milliGRAM(s) Oral every 24 hours  tamsulosin 0.4 milliGRAM(s) Oral at bedtime    MEDICATIONS  (PRN):      - Other: Advanced directives     Full Code     MOLST form found on patient window under admit section called MOLST. Not signed by provider.     No documented HCP form found on patient window     No Living will / POA / Advance directives found on [place] / patient window     No documented GOC notes on [facility]    - Other: Coordination/Plan of care     _1___ admissions in 1 year     Current admission LOS: _0__ days     LACE score: _8___ NOT AN ADVANCE ILLNESS PATIENT BASED ON MOLST.     Patient NOT previously seen by palliative medicine consult service.      Consult request for: "  patient with multiple comorbidities  "    Full consult to follow within 24h.

## 2022-08-17 NOTE — H&P ADULT - PROBLEM SELECTOR PLAN 9
F: s/p 500cc NS  E: caution with repletion given renal failure  N: pending S&S  GI: none  DVT: Heparin   Code: Full  Dispo: [id number] Patient with hyperlipidemia, home medication Lipitor 10mg qd.   - Continue home medication Patient with hypertension, home medications Coreg 3.125mg BID, Enalapril 5mg qd, Lasix 20mg qd, Hydralazine 50mg TID.  - Hold medications for now since patient was hypotensive

## 2022-08-17 NOTE — PATIENT PROFILE ADULT - ABILITY TO HEAR (WITH HEARING AID OR HEARING APPLIANCE IF NORMALLY USED):
Mildly to Moderately Impaired: difficulty hearing in some environments or speaker may need to increase volume or speak distinctly Severely Impaired: absence of useful hearing

## 2022-08-17 NOTE — CONSULT NOTE ADULT - PROBLEM SELECTOR RECOMMENDATION 3
Patient with aspiration Pneumonitis, per CT there was evidence of aspiration pneumonitis, likely in setting of vomiting. He has a baseline cough. Patient to continue to be monitored off antibiotics per documentation.

## 2022-08-17 NOTE — PROGRESS NOTE ADULT - SUBJECTIVE AND OBJECTIVE BOX
Interventional, Pulmonary, Critical, Chest Special Procedures.Initial for     Pt was seen and fully examined by myself.     Time spent with patient in minutes:137    Patient is a 89y old  Male who presents with a chief complaint of Sepsis (17 Aug 2022 10:33) Events leading to this admission reviewed. The patient emaciated, ill appearing,, answeing simple questions, not really engaging.     HPI:  89 y/o male PMH TIA, restrictive CM, CHF, HTN, HLD, Afib (on Eliquis), CKD (baseline Scr 2.0), BPH, urinary retention, limited mobility, brought from [facility] for concern of vomiting, hypoxia, sob, hypotension. Patient not providing history, obtained from chart and ED provider.  Patient with several episodes of NBNB vomiting since yesterday, subsequently onset of shortness of breath and cough which has been progressively worsening . At baseline patient does not use any supplemental oxygen but was requiring 4L NC and was hypotensive to 100/60. Per records from [facility] no recent fever, chills, rhinorrhea, chest pain, palpitations, headaches, abdominal pain, diarrhea, constipation, dysuria, urgency, frequency.  HCP/daughter: Celina Bass [phone]    Hospital Course:  Vitals: T 97.7, HR 93, /64, RR 22, SaO2 96% 4L   Labs: WBC 13.88, Hgb 9.4, CO2 20, BUN 98, Scr 4.26, Albumin 3.0, AST 78, , Trop 0.22 --> 0.20, BNP 2189  CT Chest: 1. Possible aspiration pneumonitis worse on the right. 2. Old granulomatous disease. Mild atelectasis in the right lung base. Diffuse areas of subtle ground-glass mostly in the right lung possibly an aspiration pneumonitis given the history. Peripheral interstitial opacities possibly chronic fibrosis.  CTAP:  Multiple hepatic cysts. Gallstones with no definite evidence of acute cholecystitis. Bilateral renal cysts. Mild prominence of the right renal collecting system without obstructing stone. Colonic and duodenal diverticulosis.    EKG: sinus tachycardia with 1st degree heart block prolonged QRS  Intervention: Zofran 4mg IV, NS 1L, CTX 1g, Flagyl 500mg   Consult: none  (17 Aug 2022 04:13)      REVIEW OF SYSTEMS:  Constitutional: No fever, weight loss, chills + fatigue  Eyes: No eye pain, visual disturbances, or discharge  ENMT:  + difficulty hearing, tinnitus, vertigo; No sinus or throat pain. No epistaxis, +dysphagia, dysphonia, hoarseness no odynophagia  Neck: No pain, stiffness or neck swelling.  No masses or deformities  Respiratory: +cough, no wheezing, hemoptysis  - COPD  - ILD   - PE   - ASTHMA     - PNEUMONIA  Cardiovascular: No chest pain, AF dysrhythmia, palpitations, dizziness or edema - CAD   + CHF   + HTN  Gastrointestinal: No abdominal or epigastric pain. No nausea, vomiting or hematemesis; No diarrhea or constipation. No melena or hematochezia, Icterus.          Genitourinary: No dysuria, frequency, hematuria or incontinence   +CKD/SADIQ      - ESRD  Neurological: No headaches, memory loss, loss of strength, numbness or tremors      +DEMENTIA     - STROKE    - SEIZURE  Skin: No itching, burning, rashes or lesions   Lymph Nodes: No enlarged glands  Endocrine: No heat or cold intolerance; No hair loss       - DM     - THYROID DISORDER  Musculoskeletal: No joint pain or swelling; No muscle, back or extremity pain, No edema  Psychiatric: No depression, anxiety, mood swings or difficulty sleeping  Heme/Lymph: No easy bruising or bleeding gums         - ANEMIA      - CANCER   -COAGULOPATHY  Allergy and Immunologic: No hives or eczema    PAST MEDICAL & SURGICAL HISTORY:  Chronic CHF      Hypertension      Hyperlipidemia      Chronic kidney disease (CKD)      BPH (benign prostatic hyperplasia)      Chronic atrial fibrillation        FAMILY HISTORY:    SOCIAL HISTORY:      - Tobacco     - ETOH    Allergies    No Known Allergies    Intolerances      Vital Signs Last 24 Hrs  T(C): 36.8 (17 Aug 2022 09:14), Max: 37.1 (16 Aug 2022 20:22)  T(F): 98.2 (17 Aug 2022 09:14), Max: 98.7 (16 Aug 2022 20:22)  HR: 71 (17 Aug 2022 09:14) (70 - 93)  BP: 102/53 (17 Aug 2022 09:14) (101/64 - 112/53)  BP(mean): --  RR: 16 (17 Aug 2022 09:14) (16 - 22)  SpO2: 94% (17 Aug 2022 09:14) (94% - 97%)    Parameters below as of 17 Aug 2022 09:14  Patient On (Oxygen Delivery Method): room air            MEDICATIONS:  MEDICATIONS  (STANDING):  albuterol/ipratropium for Nebulization 3 milliLiter(s) Nebulizer every 6 hours  apixaban 2.5 milliGRAM(s) Oral every 12 hours  atorvastatin 10 milliGRAM(s) Oral at bedtime  finasteride 5 milliGRAM(s) Oral every 24 hours  sertraline 50 milliGRAM(s) Oral every 24 hours  tamsulosin 0.4 milliGRAM(s) Oral at bedtime    MEDICATIONS  (PRN):      PHYSICAL EXAM:  Comfortable, no distress  Eyes: PERRL, EOM intact; conjunctiva and sclera clear  Head: Normocephalic;  No Trauma  ENMT: No nasal discharge, hoarseness, cough or hemoptysis  Neck: Supple; non tender; no masses or deformities.    No JVD  Respiratory: CTA,  - WHEEZING   - RHONCHI  - RALES  - CRACKLES.  Diminished breath sounds  BILATERAL  RIGHT  LEFT  Cardiovascular: Regular rate and rhythm. S1 and S2 Normal; No murmurs, gallops or rubs     - PPM/AICD  Gastrointestinal: Soft non-tender, non-distended; Normal bowel sounds; No hepatosplenomegaly.     -PEG    -  GT   - MOMIN  Genitourinary: No costovertebral angle tenderness. No dysuria  Extremities: AROM, No clubbing, cyanosis or edema    Vascular: Peripheral pulses palpable 2+ bilaterally  Neurological: Alert and responisve to stimuli   Skin: Warm and dry. No obvious rash  Lymph Nodes: No acute cervical or supraclavicular adenopathy  Psychiatric: Cooperative and appropriate mood    DEVICES:  - DENTURES   +IV R / L     - ETUBE   -TRACH   -CTUBE  R / L    LABS:                          7.2    11.24 )-----------( 308      ( 17 Aug 2022 08:43 )             22.1     08-17    139  |  109<H>  |  103<H>  ----------------------------<  94  4.6   |  18<L>  |  4.13<H>    Ca    9.0      17 Aug 2022 08:43  Phos  4.0     08-17  Mg     2.4     08-17    TPro  4.8<L>  /  Alb  2.6<L>  /  TBili  0.4  /  DBili  x   /  AST  53<H>  /  ALT  27  /  AlkPhos  34<L>  08-17    PT/INR - ( 16 Aug 2022 20:11 )   PT: 16.9 sec;   INR: 1.42          PTT - ( 16 Aug 2022 20:11 )  PTT:31.3 sec  RADIOLOGY & ADDITIONAL STUDIES (The following images were personally reviewed):< from: CT Chest No Cont (08.17.22 @ 00:16) >    ACC: [id number] EXAM:  CT CHEST                          PROCEDURE DATE:  08/17/2022          INTERPRETATION:  ATTENDING OVER READ:    AGREE WITH BELOW REPORT WITH THE FOLLOWING ADDITIONS:  1. 5 mm nodule within the inferior segment of the lingula. Limited   evaluation due to motion degradation in this region. This may represent a   fissural lymph node. As per Fleischner society 2017 guidelines, in a high   risk individual, optional 12 month surveillance thoracic CT is suggested.  2. Bilateral mosaic attenuation pattern which may represent air trapping   from underlying small airways inflammation as there are scattered   tree-in-bud micronodules.  3. Moderate calcified aortic mural plaque formation. 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 Periodic   interval surveillance may be of value.  4. Heavy coronary artery calcification/stents. Mild calcification of the   aortic valve and mitral annulus.      [facility] RADIOLOGIST PRELIMINARY REPORT      Initial report created on 8/17/2022 2:32:40 AM EDT  PROCEDURE INFORMATION:  Exam: CT Chest Without Contrast; Diagnostic  Exam date and time: 8/16/2022 11:54 PM  Age: 89 years old  Clinical indication: Shortness of breath and other: SOB after vomiting    TECHNIQUE:  Imaging protocol: Diagnostic computed tomography of the chest without   contrast.  3D rendering (Not supervised by radiologist): MIP and/or 3D reconstructed  images were created by the technologist.    COMPARISON:  No relevant prior studies available.    FINDINGS:  Lungs: Old granulomatous disease. Mild atelectasis in the right lung base.  Diffuse areas of subtle ground-glass mostly in the right lung possibly an  aspirationpneumonitis given the history. Peripheral interstitial   opacities  possibly chronic fibrosis.  Pleural spaces: Unremarkable. No pneumothorax. No pleural effusion.  Heart: Heart enlarged with severe coronary artery calcification.  Lymph nodes: Unremarkable. No enlarged lymph nodes.  Vasculature: Calcified plaque of the aorta.    Bones/joints: Bones osteopenic with degenerative changes.  Soft tissues: Unremarkable.    IMPRESSION:  1. Possible aspiration pneumonitis worse on the right.  2. Other findings as described.    < end of copied text >

## 2022-08-17 NOTE — H&P ADULT - NSHPLABSRESULTS_GEN_ALL_CORE
LABS:                        9.4    13.88 )-----------( 396      ( 16 Aug 2022 20:11 )             28.4     08-16    135  |  101  |  98<H>  ----------------------------<  140<H>  4.9   |  20<L>  |  4.26<H>    Ca    10.5      16 Aug 2022 20:11  Mg     2.6     08-16    TPro  6.3  /  Alb  3.0<L>  /  TBili  0.5  /  DBili  x   /  AST  78<H>  /  ALT  37  /  AlkPhos  45  08-16    PT/INR - ( 16 Aug 2022 20:11 )   PT: 16.9 sec;   INR: 1.42          PTT - ( 16 Aug 2022 20:11 )  PTT:31.3 sec

## 2022-08-17 NOTE — H&P ADULT - ASSESSMENT
89 y/o male PMH TIA, restrictive CM, CHF, HTN, HLD, Afib (on Eliquis), CKD (baseline Scr 2.0), BPH, urinary retention, limited mobility, brought from [facility] for oconcern of vomiting, hypoxia, sob, hypotension likely due to aspiration pneumonitis in setting of vomiting due to uremia from renal failure.   89 y/o male PMH TIA, restrictive CM, CHF, HTN, HLD, Afib (on Eliquis), CKD (baseline Scr 2.0), BPH, urinary retention, limited mobility, brought from [facility] for oconcern of vomiting, hypoxia, sob, hypotension likely due to aspiration pneumonitis vs UTI, found to have SADIQ on admission.

## 2022-08-17 NOTE — DIETITIAN INITIAL EVALUATION ADULT - ADD RECOMMEND
1. When medically feasible/tolerated, recommend Low sodium, No Concentrated Potassium, No Concentrated Phos, 60gm Protein diet   >>Consider Ensure High Protein Plus supplement BID (350kcal, 20gPRO per serving)   2. Encourage pt to meet nutritional needs as able   3. Monitor PO intakes, trend weights (weekly), monitor skin integrity, monitor labs (electrolytes, CMP), monitor GI fxn   4. Encourage adherence to diet education (reinforce as able)   5. Recommend Zinc 220mg/day and Vitamin C 500mg daily for wound healing   6. Pain and bowel regimen per team *monitor BMs and communicate with team*  7. Will continue to assess/honor preferences as able   8. Align nutrition interventions with GOC at all times

## 2022-08-17 NOTE — CONSULT NOTE ADULT - SUBJECTIVE AND OBJECTIVE BOX
JOHN LIMON          [id number]            ([id number])    HPI:  89 y/o male PMH TIA, restrictive CM, CHF, HTN, HLD, Afib (on Eliquis), CKD (baseline Scr 2.0), BPH, urinary retention, limited mobility, brought from [facility] for concern of vomiting, hypoxia, sob, hypotension. Patient not providing history, obtained from chart and ED provider.  Patient with several episodes of NBNB vomiting since yesterday wich subsequently onset of shortness of breath and cough which has been progressively working. At baseline patient does not use any supplemental oxygen but was requiring 4L NC and was hypotensive to 100/60. Per records from [facility] no recent fever, chills, rhinorrhea, chest pain, palpitations, headaches, abdominal pain, diarrhea, constipation, dysuria, urgency, frequency.  HCP/daughter: Celina Bass [phone]    Hospital Course:  Vitals: T 97.7, HR 93, /64, RR 22, SaO2 96% 4L   Labs: WBC 13.88, Hgb 9.4, CO2 20, BUN 98, Scr 4.26, Albumin 3.0, AST 78, , Trop 0.22 --> 0.20, BNP 2189  CT Chest: 1. Possible aspiration pneumonitis worse on the right. 2. Old granulomatous disease. Mild atelectasis in the right lung base. Diffuse areas of subtle ground-glass mostly in the right lung possibly an aspiration pneumonitis given the history. Peripheral interstitial opacities possibly chronic fibrosis.  CTAP:  Multiple hepatic cysts. Gallstones with no definite evidence of acute cholecystitis. Bilateral renal cysts. Mild prominence of the right renal collecting system without obstructing stone. Colonic and duodenal diverticulosis.    EKG: sinus tachycardia with 1st degree heart block prolonged QRS  Intervention: Zofran 4mg IV, NS 1L, CTX 1g, Flagyl 500mg   Consult: none  (17 Aug 2022 04:13)    PAST MEDICAL & SURGICAL HISTORY:  Chronic CHF    Hypertension    Hyperlipidemia    Chronic kidney disease (CKD)    BPH (benign prostatic hyperplasia)    Chronic atrial fibrillation    FAMILY HISTORY:   Reviewed and found non contributory in mother or father    SOCIAL HISTORY: Patient is currently at Rehab.     PSYCHOSOCIAL ASSESSMENT:  Jewish/Spiritual practice: unknwon   Role of organized Scientology [ ] important [ ] some [x ] unable to assess dt pt mentation   Coping: [ ] well [ ] with difficulty [ ] poor coping [x ] unable to assess dt pt mentation  Support system: [ ] strong [x ] adequate [ ] inadequate    ROS:    Unable to attain due to:  AMS    Dyspnea (Jose Enrique 0-10):      0                  N/V (Y/N):                N              Secretions (Y/N) :     N           Agitation(Y/N): N  Pain (Y/N):     N  -Provocation/Palliation: n/a   -Quality/Quantity: n/a   -Radiating: n/a   -Severity: n/a   -Timing/Frequency: n/a   -Impact on ADLs: n/a     General:  unable to obtain   HEENT:    unable to obtain   Neck:  unable to obtain   CVS:  unable to obtain   Resp:  unable to obtain   GI:  unable to obtain   :  unable to obtain   Musc: unable to obtain Neuro:  Denied  Psych:  unable to obtain   Skin:  unable to obtain   Lymph:  unable to obtain     Allergies    No Known Allergies    Intolerances    Opiate Naive (Y/N):  Y  -iStop reviewed (Y/N): Y (Ref#:  [id number]            )    Medications:      MEDICATIONS  (STANDING):  albuterol/ipratropium for Nebulization 3 milliLiter(s) Nebulizer every 6 hours  apixaban 2.5 milliGRAM(s) Oral every 12 hours  atorvastatin 10 milliGRAM(s) Oral at bedtime  finasteride 5 milliGRAM(s) Oral every 24 hours  sertraline 50 milliGRAM(s) Oral every 24 hours  tamsulosin 0.4 milliGRAM(s) Oral at bedtime    MEDICATIONS  (PRN):    Labs:    CBC:                        7.2    11.24 )-----------( 308      ( 17 Aug 2022 08:43 )             22.1     CMP:        139  |  109<H>  |  103<H>  ----------------------------<  94  4.6   |  18<L>  |  4.13<H>    Ca    9.0      17 Aug 2022 08:43  Phos  4.0       Mg     2.4         TPro  4.8<L>  /  Alb  2.6<L>  /  TBili  0.4  /  DBili  x   /  AST  53<H>  /  ALT  27  /  AlkPhos  34<L>  08-17    PT/INR - ( 16 Aug 2022 20:11 )   PT: 16.9 sec;   INR: 1.42          PTT - ( 16 Aug 2022 20:11 )  PTT:31.3 sec    Imaging:      < from: CT Abdomen and Pelvis No Cont (22 @ 00:16) >    ACC: [id number] EXAM:  CT ABDOMEN AND PELVIS                          PROCEDURE DATE:  2022    IMPRESSION:  1. Exam limited by motion artifact.  2. No bowel obstruction.  3. Other findings as described.    --- End of Report ---    < end of copied text >    < from: CT Chest No Cont (22 @ 00:16) >  ACC: [id number] EXAM:  CT CHEST                          PROCEDURE DATE:  2022          INTERPRETATION:  ATTENDING OVER READ:    AGREE WITH BELOW REPORT WITH THE FOLLOWING ADDITIONS:  1. 5 mm nodule within the inferior segment of the lingula. Limited   evaluation due to motion degradation in this region. This may represent a   fissural lymph node. As per Fleischner society 2017 guidelines, in a high   risk individual, optional 12 month surveillance thoracic CT is suggested.  2. Bilateral mosaic attenuation pattern which may represent air trapping   from underlying small airways inflammation as there are scattered   tree-in-bud micronodules.  3. Moderate calcified aortic mural plaque formation. 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 Periodic   interval surveillance may be of value.  4. Heavy coronary artery calcification/stents. Mild calcification of the   aortic valve and mitral annulus.IMPRESSION:  1. Possible aspiration pneumonitis worse on the right.  2. Other findings as described.    < end of copied text >    PEx:  T(C): 36.8 (22 @ 09:14), Max: 37.1 (22 @ 20:22)  HR: 71 (22 @ 09:14) (70 - 93)  BP: 102/53 (22 @ 09:14) (101/64 - 112/53)  RR: 16 (22 @ 09:14) (16 - 22)  SpO2: 94% (22 @ 09:14) (94% - 97%)  Wt(kg): 68kG  Daily     Daily   CAPILLARY BLOOD GLUCOSE    I&O's Summary    GENERAL:  [ x]Alert  [ x]Oriented x 1-2  [ ]Lethargic  [ ]Cachexia  [ ]Unarousable  [x ]Verbal  [ ]Non-Verbal  Behavioral:   [ ] Anxiety  [ ] Delirium [ ] Agitation [ x] Other - calm  HEENT:  [ ]Normal   [x ]Dry mouth   [ ]ET Tube/Trach  [ ]Oral lesions  PULMONARY:   [ ]Clear  [ ]Tachypnea  [ ]Audible excessive secretions   [x ]Rhonchi  anteriorly       [ ]Right [ ]Left [ ]Bilateral  [ ]Crackles        [ ]Right [ ]Left [ ]Bilateral  [ ]Wheezing     [ ]Right [ ]Left [ ]Bilateral  CARDIOVASCULAR:    [x ]Regular [ ]Irregular [ ]Tachy  [ ]Ariel [ ]Murmur [ ]Other  GASTROINTESTINAL:  [ x]Soft  [ ]Distended   [ ]+BS  [ x]Non tender [ ]Tender  [ ]PEG [ ]OGT/ NGT  Last BM:   GENITOURINARY:  [ ]Normal [x ] Incontinent   [ ]Oliguria/Anuria   [ ]Epstein  MUSCULOSKELETAL:   [ ]Normal   [ x]Weakness  [ ]Bed/Wheelchair bound [ ]Edema  NEUROLOGIC:   [ ]No focal deficits  [ x] Cognitive impairment  [ ] Dysphagia [ ]Dysarthria [ ] Paresis [ ]Other   SKIN:   [x ]Normal   [ ]Pressure ulcer(s)  [ ]Rash      Preadmit Karnofsky: 60 %           Current Karnofsky:  60   %  Cachexia (Y/N): N    Advanced Directives:     Full Code    DECISION MAKER: patient is unable to make decisions for himself  LEGAL SURROGATE: Becky [phone] and Emerita [phone]    GOALS OF CARE DISCUSSION       Palliative care info/counseling provided	           Family meeting       Advanced Directives addressed please see Advance Care Planning Note	           See previous Palliative Medicine Note       Documentation of GOC: 	Full code           REFERRALS	        Unit SW/Case Mgmt       PT/OT

## 2022-08-17 NOTE — H&P ADULT - PROBLEM SELECTOR PLAN 3
Patient with history of BPH complicated by urinary retention, home medications Finasteride 5mg qd, and Tamsulosin 0.4mg qd.   - Continue home medications Patient with recent cough, sob, hypoxia at [facility] with evidence of aspiration pneumonitis, likely in setting of vomiting.  Patient currently continues to cough but no longer hypoxic. No infiltrates or consolidations on imaging.   - Monitor off antibiotics at this time, if becomes febrile consider starting treatment for pneumonia.   - S&S eval in AM  - Aspiration precautions

## 2022-08-17 NOTE — H&P ADULT - HISTORY OF PRESENT ILLNESS
89 y/o male PMH TIA, restrictive CM, CHF, HTN, HLD, Afib (on Eliquis), CKD (baseline Scr 2.0), BPH, urinary retention, limited mobility, brought from [facility] for oconcern of vomiting, hypoxia, sob, hypotension. Patient not providing history, obtained from chart and ED provider.  Patient with several episodes of NBNB vomiting since yesterday wich subsequently onset of shortness of breath and cough which has been progressively working. At baseline patient does not use any supplemental oxygen but was requiring 4L NC and was hypotensive to 100/60. Per records from [facility] no recent fever, chills, rhinorrhea, chest pain, palpitations, headaches, abdominal pain, diarrhea, constipation, dysuria, urgency, frequency.  HCP/daughter: brina farris [phone]    Hospital Course:  Vitals: T 97.7, HR 93, /64, RR 22, SaO2 96% 4L   Labs: WBC 13.88, Hgb 9.4, CO2 20, BUN 98, Scr 4.26, Albumin 3.0, AST 78, , Trop 0.22 --> 0.20, BNP 2189  CT Chest: 1. Possible aspiration pneumonitis worse on the right. 2. Old granulomatous disease. Mild atelectasis in the right lung base. Diffuse areas of subtle ground-glass mostly in the right lung possibly an aspiration pneumonitis given the history. Peripheral interstitial opacities possibly chronic fibrosis.  CTAP:  Multiple hepatic cysts. Gallstones with no definite evidence of acute cholecystitis. Bilateral renal cysts. Mild prominence of the right renal collecting system without obstructing stone. Colonic and duodenal diverticulosis.    Intervention: Zofran 4mg IV, NS 1L   Consult: none  89 y/o male PMH TIA, restrictive CM, CHF, HTN, HLD, Afib (on Eliquis), CKD (baseline Scr 2.0), BPH, urinary retention, limited mobility, brought from [facility] for concern of vomiting, hypoxia, sob, hypotension. Patient not providing history, obtained from chart and ED provider.  Patient with several episodes of NBNB vomiting since yesterday wich subsequently onset of shortness of breath and cough which has been progressively working. At baseline patient does not use any supplemental oxygen but was requiring 4L NC and was hypotensive to 100/60. Per records from [facility] no recent fever, chills, rhinorrhea, chest pain, palpitations, headaches, abdominal pain, diarrhea, constipation, dysuria, urgency, frequency.  HCP/daughter: Celina Bass [phone]    Hospital Course:  Vitals: T 97.7, HR 93, /64, RR 22, SaO2 96% 4L   Labs: WBC 13.88, Hgb 9.4, CO2 20, BUN 98, Scr 4.26, Albumin 3.0, AST 78, , Trop 0.22 --> 0.20, BNP 2189  CT Chest: 1. Possible aspiration pneumonitis worse on the right. 2. Old granulomatous disease. Mild atelectasis in the right lung base. Diffuse areas of subtle ground-glass mostly in the right lung possibly an aspiration pneumonitis given the history. Peripheral interstitial opacities possibly chronic fibrosis.  CTAP:  Multiple hepatic cysts. Gallstones with no definite evidence of acute cholecystitis. Bilateral renal cysts. Mild prominence of the right renal collecting system without obstructing stone. Colonic and duodenal diverticulosis.    EKG: sinus tachycardia with 1st degree heart block prolonged QRS  Intervention: Zofran 4mg IV, NS 1L, CTX 1g, Flagyl 500mg   Consult: none

## 2022-08-17 NOTE — H&P ADULT - PROBLEM SELECTOR PLAN 4
Hgb on admission 9.4 (baseline 8) normocytic, likely GM vs AOCD in setting of renal failure. Currently no signs of active bleeding (no hematochezia, melena, hemoptysis, hematuria)  - obtain iron panel  - trend CBC  - maintain active T&S  - transfuse if Hgb <7 SADIQ on admission, BUN/Scr 98/4.26 (baseline Scr 2), likely postrenal given patient's history of urinary retention vs prerenal in setting of poor po intake and hypovolemia since patient is on Lasix vs recent UTI (patient was being treated with Levaquin in NH for reported UTI for 7 days starting 8.13 UCx from 8/3 with kleb pneumo).    - Nephrology consult in AM  - bladder scan  - trend Cr, avoid nephrotoxic drugs, renally dose meds  - obtain ulytes  - F/u UA and Ucxm if UTI start treatment Patient A&Ox1 on admission, unknown baseline, unclear if there is underlying dementia of if this is an acute process.  Daughter (HCP) reports she is estranged from father and does not know his current mental status.    - F/u CTH  - Obtain collateral from nursing home regarding patient's baseline Patient A&Ox1 on admission, unknown baseline, unclear if there is underlying dementia of if this is an acute process.  Daughter (HCP) reports she is estranged from father and does not know his current mental status.    - F/u CTH  - Obtain collateral from nursing home regarding patient's baseline      # Elevated troponin   Patient with troponin 0.22 on admission which trended down to 0.2, likely elevated in setting of SADIQ and CKD.   - F/u repeat EKG and troponin at 10am

## 2022-08-18 NOTE — PROGRESS NOTE ADULT - PROBLEM SELECTOR PLAN 4
RESOLVED. Patient with nausea and vomiting for two days, possibly in setting of uremia or UTI.  Other differential include viral gastroenteritis, no evidence of colitis or most severe GI infection on imaging and no reported diarrhea.  Cardiac etiology unlikely, patient without symptoms, no ischemia on EKG, trop likely up in setting of renal failure now peaked.  - CTH: no acute pathology     Plan:  - Continue Zofran PRN vomiting

## 2022-08-18 NOTE — PROGRESS NOTE ADULT - PROBLEM SELECTOR PLAN 8
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  Patient clinically euvolemic on exam.  - Hold medications for now since patient was hypotensive and euvolemic, can restart one at a time  - TTE in AM  - strict I/Os, monitor UOP carefully  - daily weights Patient with CHF, no previous echo, BNP on admission 2189, unclear if ischemic or nonischemic etiology but patient is documented to have restrictive cardiomyopathy  Home medications Coreg 3.125mg BID, Enalapril 5mg qd, Lasix 20mg qd, Hydralazine 50mg TID.  Patient clinically euvolemic on exam.  TTE in AM    Plan:   - strict I/Os, monitor UOP carefully  - daily weights Patient with CHF, no previous echo, BNP on admission 2189, unclear if ischemic or nonischemic etiology but patient is documented to have restrictive cardiomyopathy  Home medications Coreg 3.125mg BID, Enalapril 5mg qd, Lasix 20mg qd, Hydralazine 50mg TID.  Patient clinically euvolemic on exam.  TTE: LVEF 45-50% with global hypokinesis. Abnormal septal motion seen due to a left bundle branch block. Mild MR, mild AR, trivial PCD effusion  - Euvolemic on exam, - JVD, - b/l L/E edema, current b/l base crackles likely secondary to asp PNA    Plan:   - strict I/Os, monitor UOP carefully

## 2022-08-18 NOTE — PROGRESS NOTE ADULT - PROBLEM SELECTOR PLAN 5
RESOLVED. Patient A&Ox1 on admission, unknown baseline, unclear if there is underlying dementia of if this is an acute process.  Daughter (HCP) reports she is estranged from father and does not know his current mental status.          # Elevated troponin   PMH constrictive CM  Patient with troponin 0.22 on admission which trended down to 0.2, likely elevated in setting of SADIQ and CKD.   - Repeat EKG xx  - troponin downtrending 0.2-> 0.18

## 2022-08-18 NOTE — PHYSICAL THERAPY INITIAL EVALUATION ADULT - GENERAL OBSERVATIONS, REHAB EVAL
Patient received in semi-supine to 40 degrees, with ALENA dorsey, +[place] rosenbaum, in no acute distress, and agreeable to PT. Patient demonstrated overall max-A of 1-2 person for bed mobility, and demonstrated poor sitting balance at EOB, requiring constant support and VCing to maintain upright posture. Per prior [facility] chart notes, patient currently at his baseline with mobility. Patient currently not a candidate for skilled PT intervention at this time.

## 2022-08-18 NOTE — CHART NOTE - NSCHARTNOTEFT_GEN_A_CORE
18:30 Urology team unable to place Epstein at bedside due to false passage. Pt was recommended to get a cystoscopy guided Epstein placed due to urinary retention but declined despite being informed of the risks of urinary retention and its effect on his worsening kidney function. Revisited at 20:30 and declined once again.

## 2022-08-18 NOTE — SWALLOW VFSS/MBS ASSESSMENT ADULT - DIAGNOSTIC IMPRESSIONS
Pt presents with moderate oropharyngeal dysphagia. Oral phase was characterized by inefficient mastication of solids, Pt presents with moderate oropharyngeal dysphagia. Oral phase was characterized by inefficient mastication of solids, slow and disorganized lingual movement, delayed A-P transit and reduced bolus control. Pharyngeal phase deficits were marked by delayed swallow trigger, mistimed LVC, and reduced tongue base retraction. The above resulted in laryngeal penetration and SILENT aspiration of all consistencies and bolus stasis (solids>purees>liquids). A chin tuck was effective in improving swallow safety and efficiency.     Suspect deficits are r/t advanced age and overall deconditioning. Prognosis is somewhat guarded. Pt appears at high risk for further pulmonary complications associated with aspiration and would benefit from a modified diet with specified swallowing precautions IF consistent with GOC.

## 2022-08-18 NOTE — PHYSICAL THERAPY INITIAL EVALUATION ADULT - MANUAL MUSCLE TESTING RESULTS, REHAB EVAL
functional mobility observed against gravity as: 2+/5 for BUEs; 2+/5 for BLEs/grossly assessed due to

## 2022-08-18 NOTE — PROGRESS NOTE ADULT - ASSESSMENT
89 y/o male PMH TIA, restrictive CM, CHF, HTN, HLD, Afib (on Eliquis), CKD (baseline Scr 2.0), BPH, urinary retention, limited mobility, brought from [facility] for oconcern of vomiting, hypoxia, sob, hypotension likely due to aspiration pneumonitis vs UTI, found to have SADIQ on admission.

## 2022-08-18 NOTE — PHYSICAL THERAPY INITIAL EVALUATION ADULT - PERTINENT HX OF CURRENT PROBLEM, REHAB EVAL
Patient is a 89 y/o male who was brought to ED from [facility] for s/s of vomiting, hypoxia, SOB, and hypotension. Patient had several episodes of NBNB vomiting since yesterday, with onset of SOB and coughing, which has been progressing. Patient at baseline does not require O2, however, patient was reporting difficulty of breathing and requiring 4L of O2 via NC, and hypotensive to 100/60. Per records from [facility] no recent fever, chills, rhinorrhea, chest pain, palpitations, headaches, abdominal pain, diarrhea, constipation, dysuria, urgency, frequency. Pt's PMHx of: TIA, restrictive CM, CHF, HTN, HLD, Afib (on Eliquis), CKD (baseline Scr 2.0), BPH, urinary retention, limited mobility. Patient currently admitted for possible aspiration pneumonitis, and is pending swallow function imaging/assessment. CT Head: (-) for acute ICH; CXR: possible aspiration pneumonitis (R > L); CT Abdominal: moderate aortoiliac atherosclerotic disease without aneurysm.

## 2022-08-18 NOTE — PROGRESS NOTE ADULT - PROBLEM SELECTOR PLAN 1
Patient meeting two SIRS critera HR 93 WBC 13.88 on admission possible sources aspiration pneumonitis and UTI.  Patient has UCx positive for the kleb pnuemo on 8/3 and was being treated with Levaquin since 8/13.  Abdominal source unlikely patient without diarrhea, no evidence of infection on CTAP.  Patient remains afebrile, hemodynamically stable   - improving leukocytosis    - Bcx NTD  - F/u UA/ Ucx, procal  - c/w zosyn 3.375  - c/w vest PT TID

## 2022-08-18 NOTE — PROGRESS NOTE ADULT - SUBJECTIVE AND OBJECTIVE BOX
Medicine Progress Note    O/N events: No acute events overnight    feeling much better, no cough. Denies CP, SOB, abdominal pain. Tolerating diet   Otherwise negat    MEDICATIONS  (STANDING):  albuterol/ipratropium for Nebulization 3 milliLiter(s) Nebulizer every 6 hours  apixaban 2.5 milliGRAM(s) Oral every 12 hours  atorvastatin 10 milliGRAM(s) Oral at bedtime  finasteride 5 milliGRAM(s) Oral every 24 hours  piperacillin/tazobactam IVPB.. 2.25 Gram(s) IV Intermittent every 8 hours  sertraline 50 milliGRAM(s) Oral every 24 hours  tamsulosin 0.4 milliGRAM(s) Oral at bedtime    MEDICATIONS  (PRN):    CAPILLARY BLOOD GLUCOSE            OBJECTIVE:  Vital Signs Last 24 Hrs  T(C): 36.4 (18 Aug 2022 11:08), Max: 37.1 (17 Aug 2022 18:54)  T(F): 97.5 (18 Aug 2022 11:08), Max: 98.7 (17 Aug 2022 18:54)  HR: 74 (18 Aug 2022 11:08) (50 - 79)  BP: 130/68 (18 Aug 2022 11:08) (92/40 - 130/68)  BP(mean): 68 (18 Aug 2022 06:00) (68 - 68)  RR: 18 (18 Aug 2022 11:08) (18 - 18)  SpO2: 96% (18 Aug 2022 11:08) (95% - 100%)    Parameters below as of 18 Aug 2022 11:08  Patient On (Oxygen Delivery Method): room air        PHYSICAL EXAM:  General: AAOx3, NAD  Head: NC/AT; MMM; PERRL; EOMI;  Neck: Supple; no JVD  Respiratory: CTAB; wheezing b/l bases  Cardiovascular: Regular rhythm/rate; S1/S2+, no murmurs, rubs gallops   Gastrointestinal: Soft; NT; obese abdomen, bowel sounds normal and present  Extremities: WWP; no edema/cyanosis  Neurological: CNII-XII grossly intact; no obvious focal deficits  Skin: Clean and intact. Good skin turgor. Without open wounds and sores  I&O's Summary    17 Aug 2022 07:01  -  18 Aug 2022 07:00  --------------------------------------------------------  IN: 0 mL / OUT: 550 mL / NET: -550 mL        LABS:                        7.2    11.24 )-----------( 308      ( 17 Aug 2022 08:43 )             22.1     08-17    139  |  109<H>  |  103<H>  ----------------------------<  94  4.6   |  18<L>  |  4.13<H>    Ca    9.0      17 Aug 2022 08:43  Phos  4.0     08-17  Mg     2.4     08-17    TPro  4.8<L>  /  Alb  2.6<L>  /  TBili  0.4  /  DBili  x   /  AST  53<H>  /  ALT  27  /  AlkPhos  34<L>  08-17    PT/INR - ( 16 Aug 2022 20:11 )   PT: 16.9 sec;   INR: 1.42          PTT - ( 16 Aug 2022 20:11 )  PTT:31.3 sec  CARDIAC MARKERS ( 17 Aug 2022 08:43 )  x     / 0.18 ng/mL / 265 U/L / x     / 4.0 ng/mL  CARDIAC MARKERS ( 16 Aug 2022 22:57 )  x     / 0.20 ng/mL / x     / x     / x      CARDIAC MARKERS ( 16 Aug 2022 20:11 )  x     / 0.22 ng/mL / 379 U/L / x     / 5.0 ng/mL          Culture - Blood (collected 17 Aug 2022 01:46)  Source: .Blood Blood  Preliminary Report (18 Aug 2022 03:00):    No growth at 1 day.    Culture - Blood (collected 17 Aug 2022 01:46)  Source: .Blood Blood  Preliminary Report (18 Aug 2022 03:00):    No growth at 1 day.          RADIOLOGY & ADDITIONAL TESTS:  Imaging from Last 24 Hours:

## 2022-08-18 NOTE — PROGRESS NOTE ADULT - PROBLEM SELECTOR PLAN 2
Patient with nausea and vomiting for two days, possibly in setting of uremia or UTI.  Other differential include viral gastroenteritis, no evidence of colitis or most severe GI infection on imaging and no reported diarrhea.  Cardiac etiology unlikely, patient without symptoms, no ischemia on EKG, trop likely up in setting of renal failure now peaked.  - Continue Zofran PRN vomiting   - F/u CTH Patient with nausea and vomiting for two days, possibly in setting of uremia or UTI.  Other differential include viral gastroenteritis, no evidence of colitis or most severe GI infection on imaging and no reported diarrhea.  Cardiac etiology unlikely, patient without symptoms, no ischemia on EKG, trop likely up in setting of renal failure now peaked.  - CTH: no acute pathology     Plan:  - Continue Zofran PRN vomiting Patient with recent cough, sob, hypoxia at [facility] with evidence of aspiration pneumonitis, likely in setting of vomiting.  Patient currently continues to cough but no longer hypoxic. No infiltrates or consolidations on imaging.     Plan:  - S&S cleared for minced and moist with thin liquids  - Maintained for aspiration precautions

## 2022-08-18 NOTE — PROGRESS NOTE ADULT - PROBLEM SELECTOR PLAN 12
F: s/p 500cc NS  E: caution with repletion given renal failure  N: pending S&S  GI: none  DVT: eliquis  Code: Full  Dispo: SHYLA F: none  E: caution with repletion given renal failure  N: minced and moist  GI: none  DVT: eliquis  Code: DNR  Dispo: FORTUNATO F: none  E: caution with repletion given renal failure  N: minced and moist  GI: Protonix 40 PO  Bowel: miralax, senna  DVT: eliquis 2.5 BID  Code: DNR  Dispo: FORTUNATO F: none  E: caution with repletion given renal failure  N: minced and moist  GI: hold in setting of SADIQ  Bowel: miralax, senna  DVT: eliquis 2.5 BID  Code: DNR  Dispo: FORTUNATO

## 2022-08-18 NOTE — PROGRESS NOTE ADULT - PROBLEM SELECTOR PLAN 1
Patient meeting two SIRS critera HR 93 WBC 13.88 on admission possible sources aspiration pneumonitis and UTI.  Patient has UCx positive for the kleb pnuemo on 8/3 and was being treated with Levaquin since 8/13.  Abdominal source unlikely patient without diarrhea, no evidence of infection on CTAP.  Patient remains afebrile, hemodynamically stable   - improving leukocytosis      - Bcx NTD  - F/u Bcx, Ucx, procal  - c/w zosyn 3.375 Patient meeting two SIRS critera HR 93 WBC 13.88 on admission possible sources aspiration pneumonitis and UTI.  Patient has UCx positive for the kleb pnuemo on 8/3 and was being treated with Levaquin since 8/13.  Abdominal source unlikely patient without diarrhea, no evidence of infection on CTAP.  Patient remains afebrile, hemodynamically stable   - improving leukocytosis    - Bcx NTD  - F/u Ucx, procal  - c/w zosyn 3.375 Patient meeting two SIRS critera HR 93 WBC 13.88 on admission possible sources aspiration pneumonitis and UTI.  Patient has UCx positive for the kleb pnuemo on 8/3 and was being treated with Levaquin since 8/13.  Abdominal source unlikely patient without diarrhea, no evidence of infection on CTAP.  Patient remains afebrile, hemodynamically stable   - improving leukocytosis    - Bcx NTD  - F/u UA/ Ucx, procal  - c/w zosyn 3.375  - c/w vest PT TID

## 2022-08-18 NOTE — PROGRESS NOTE ADULT - SUBJECTIVE AND OBJECTIVE BOX
INCOMPLETE Medicine Progress Note    SUBJECTIVE / OVERNIGHT EVENTS:  O/N events: No acute events overnight   Patient was seen and examined at bedside feeling much better, no cough. Denies CP, SOB, abdominal pain. Tolerating diet   Otherwise negative ROS    MEDICATIONS  (STANDING):  albuterol/ipratropium for Nebulization 3 milliLiter(s) Nebulizer every 6 hours  apixaban 2.5 milliGRAM(s) Oral every 12 hours  atorvastatin 10 milliGRAM(s) Oral at bedtime  finasteride 5 milliGRAM(s) Oral every 24 hours  piperacillin/tazobactam IVPB.. 2.25 Gram(s) IV Intermittent every 8 hours  sertraline 50 milliGRAM(s) Oral every 24 hours  tamsulosin 0.4 milliGRAM(s) Oral at bedtime    MEDICATIONS  (PRN):    CAPILLARY BLOOD GLUCOSE            OBJECTIVE:  Vital Signs Last 24 Hrs  T(C): 36.4 (18 Aug 2022 11:08), Max: 37.1 (17 Aug 2022 18:54)  T(F): 97.5 (18 Aug 2022 11:08), Max: 98.7 (17 Aug 2022 18:54)  HR: 74 (18 Aug 2022 11:08) (50 - 79)  BP: 130/68 (18 Aug 2022 11:08) (92/40 - 130/68)  BP(mean): 68 (18 Aug 2022 06:00) (68 - 68)  RR: 18 (18 Aug 2022 11:08) (18 - 18)  SpO2: 96% (18 Aug 2022 11:08) (95% - 100%)    Parameters below as of 18 Aug 2022 11:08  Patient On (Oxygen Delivery Method): room air        PHYSICAL EXAM:  General: AAOx3, NAD  Head: NC/AT; MMM; PERRL; EOMI;  Neck: Supple; no JVD  Respiratory: CTAB; wheezing b/l bases  Cardiovascular: Regular rhythm/rate; S1/S2+, no murmurs, rubs gallops   Gastrointestinal: Soft; NT; obese abdomen, bowel sounds normal and present  Extremities: WWP; no edema/cyanosis  Neurological: CNII-XII grossly intact; no obvious focal deficits  Skin: Clean and intact. Good skin turgor. Without open wounds and sores  I&O's Summary    17 Aug 2022 07:01  -  18 Aug 2022 07:00  --------------------------------------------------------  IN: 0 mL / OUT: 550 mL / NET: -550 mL        LABS:                        7.2    11.24 )-----------( 308      ( 17 Aug 2022 08:43 )             22.1     08-17    139  |  109<H>  |  103<H>  ----------------------------<  94  4.6   |  18<L>  |  4.13<H>    Ca    9.0      17 Aug 2022 08:43  Phos  4.0     08-17  Mg     2.4     08-17    TPro  4.8<L>  /  Alb  2.6<L>  /  TBili  0.4  /  DBili  x   /  AST  53<H>  /  ALT  27  /  AlkPhos  34<L>  08-17    PT/INR - ( 16 Aug 2022 20:11 )   PT: 16.9 sec;   INR: 1.42          PTT - ( 16 Aug 2022 20:11 )  PTT:31.3 sec  CARDIAC MARKERS ( 17 Aug 2022 08:43 )  x     / 0.18 ng/mL / 265 U/L / x     / 4.0 ng/mL  CARDIAC MARKERS ( 16 Aug 2022 22:57 )  x     / 0.20 ng/mL / x     / x     / x      CARDIAC MARKERS ( 16 Aug 2022 20:11 )  x     / 0.22 ng/mL / 379 U/L / x     / 5.0 ng/mL          Culture - Blood (collected 17 Aug 2022 01:46)  Source: .Blood Blood  Preliminary Report (18 Aug 2022 03:00):    No growth at 1 day.    Culture - Blood (collected 17 Aug 2022 01:46)  Source: .Blood Blood  Preliminary Report (18 Aug 2022 03:00):    No growth at 1 day.          RADIOLOGY & ADDITIONAL TESTS:  Imaging from Last 24 Hours:

## 2022-08-18 NOTE — SWALLOW VFSS/MBS ASSESSMENT ADULT - ORAL PHASE COMMENTS
Slow and repetitive lingual movement. Significant difficulties propelling denser boli into the oropharynx likely d/t lingual weakness. With soft solid bolus, pt required the introduction of liquids to aid in A-P transit. Reduced lingual control resulted in the premature spillage of liquids into the hypopharynx.

## 2022-08-18 NOTE — CONSULT NOTE ADULT - SUBJECTIVE AND OBJECTIVE BOX
Patient is a 89y old  Male who presents with a chief complaint of aspiration PNA vs UTI (18 Aug 2022 07:33)   HPI:  89 y/o male PMH TIA, restrictive CM, CHF, HTN, HLD, Afib (on Eliquis), CKD (baseline Scr 2.0), BPH, urinary retention, limited mobility, brought from [facility] for concern of vomiting, hypoxia, sob, hypotension. Patient not providing history, obtained from chart and ED provider.  Patient with several episodes of NBNB vomiting since yesterday wich subsequently onset of shortness of breath and cough which has been progressively working. At baseline patient does not use any supplemental oxygen but was requiring 4L NC and was hypotensive to 100/60. Per records from [facility] no recent fever, chills, rhinorrhea, chest pain, palpitations, headaches, abdominal pain, diarrhea, constipation, dysuria, urgency, frequency.  HCP/daughter: Celina Bass [phone]    Hospital Course:  Vitals: T 97.7, HR 93, /64, RR 22, SaO2 96% 4L   Labs: WBC 13.88, Hgb 9.4, CO2 20, BUN 98, Scr 4.26, Albumin 3.0, AST 78, , Trop 0.22 --> 0.20, BNP 2189  CT Chest: 1. Possible aspiration pneumonitis worse on the right. 2. Old granulomatous disease. Mild atelectasis in the right lung base. Diffuse areas of subtle ground-glass mostly in the right lung possibly an aspiration pneumonitis given the history. Peripheral interstitial opacities possibly chronic fibrosis.  CTAP:  Multiple hepatic cysts. Gallstones with no definite evidence of acute cholecystitis. Bilateral renal cysts. Mild prominence of the right renal collecting system without obstructing stone. Colonic and duodenal diverticulosis.    EKG: sinus tachycardia with 1st degree heart block prolonged QRS  Intervention: Zofran 4mg IV, NS 1L, CTX 1g, Flagyl 500mg   Consult: none  (17 Aug 2022 04:13)    : As stated above. Urology consulted for rosenbaum placement for strict I's and O's and urinary retention. Patient able to void small amounts through condom catheter but primary team states recent PVR >300 and patient has worsening SADIQ. Nephrology recommending rosenbaum as well. Patient was attempted to be straight cathed by nuring 08/17 and 08/18. Per primary team nursing not able to place rosenbaum and always got "bright red blood." Urology saw patient yesterday to place rosenbaum but patient refused. Today, patient agreed to rosenbaum. Using sterile technique a 16fr coude was attempted with resistance at prostate. Upon removal of the 16fr coude red blood noted at tip of rosenbaum,.       Vital Signs Last 24 Hrs  T(C): 36.4 (18 Aug 2022 11:08), Max: 37.1 (17 Aug 2022 18:54)  T(F): 97.5 (18 Aug 2022 11:08), Max: 98.7 (17 Aug 2022 18:54)  HR: 74 (18 Aug 2022 11:08) (50 - 79)  BP: 87/54 (18 Aug 2022 17:00) (87/54 - 130/68)  BP(mean): 68 (18 Aug 2022 06:00) (68 - 68)  RR: 18 (18 Aug 2022 11:08) (18 - 18)  SpO2: 96% (18 Aug 2022 11:08) (95% - 100%)    Parameters below as of 18 Aug 2022 11:08  Patient On (Oxygen Delivery Method): room air      I&O's Summary    17 Aug 2022 07:01  -  18 Aug 2022 07:00  --------------------------------------------------------  IN: 0 mL / OUT: 550 mL / NET: -550 mL    18 Aug 2022 07:01  -  18 Aug 2022 18:51  --------------------------------------------------------  IN: 0 mL / OUT: 300 mL / NET: -300 mL        PE:  Gen: alert and oriented. laying in bed with oxygen mask in place  Abd: soft, non-tender, non-distended   : circumcised phallus, no suprapubic tenderness     LABS:                        8.6    8.64  )-----------( 278      ( 18 Aug 2022 15:07 )             28.1     08-18    131<L>  |  100  |  100<H>  ----------------------------<  97  4.5   |  13<L>  |  4.41<H>    Ca    9.4      18 Aug 2022 15:07  Phos  4.1     08-18  Mg     2.4     08-18    TPro  5.6<L>  /  Alb  2.3<L>  /  TBili  0.6  /  DBili  x   /  AST  75<H>  /  ALT  27  /  AlkPhos  44  08-18    PT/INR - ( 16 Aug 2022 20:11 )   PT: 16.9 sec;   INR: 1.42          PTT - ( 16 Aug 2022 20:11 )  PTT:31.3 sec  Cultures  Culture Results:   No growth at 1 day. (08-17 @ 01:46)  Culture Results:   No growth at 1 day. (08-17 @ 01:46)      A/P

## 2022-08-18 NOTE — PROGRESS NOTE ADULT - PROBLEM SELECTOR PLAN 6
Patient with history of BPH complicated by urinary retention, home medications Finasteride 5mg qd, and Tamsulosin 0.4mg qd.   - Continue home medications

## 2022-08-18 NOTE — PROGRESS NOTE ADULT - PROBLEM SELECTOR PLAN 12
F: none  E: caution with repletion given renal failure  N: minced and moist  GI: hold in setting of SADIQ  Bowel: miralax, senna  DVT: eliquis 2.5 BID  Code: DNR  Dispo: FORTUNATO

## 2022-08-18 NOTE — SWALLOW VFSS/MBS ASSESSMENT ADULT - ORAL PHASE
Delayed oral transit time/Reduced anterior - posterior transport/Uncontrolled bolus / spillover in hypopharynx/Laryngeal penetration before swallow - silent

## 2022-08-18 NOTE — PROGRESS NOTE ADULT - ASSESSMENT
89 y/o male PMH TIA, restrictive CM, CHF, HTN, HLD, Afib (on Eliquis), CKD (baseline Scr 2.0), BPH, urinary retention, limited mobility, brought from [facility] for oconcern of vomiting, hypoxia, sob, hypotension likely due to aspiration pneumonitis vs UTI, found to have SADIQ on admission.  sepsis  dementia  Cont IV zosyn  diet  OOB  f/u labs

## 2022-08-18 NOTE — CONSULT NOTE ADULT - PROBLEM SELECTOR RECOMMENDATION 9
- 16fr coude unable to be placed   - if primary team would like rosenbaum likely need a cystoscopy guided rosenbaum placement. patient needs to be willing to sign consent prior to urology setting up bedside procedure   - please page urology if/ when patient is amenable to bedside cysto guided rosenbaum placement   - discussed with  team
PPS 50%. Skin care PRN. Assist with ADL's PRN. Appreciate PT involvement.

## 2022-08-18 NOTE — SWALLOW VFSS/MBS ASSESSMENT ADULT - ADDITIONAL RECOMMENDATIONS
Control risk factors for aspiration PNA via frequent oral hygiene and increasing physical mobility as tolerated.

## 2022-08-18 NOTE — PROGRESS NOTE ADULT - PROBLEM SELECTOR PLAN 5
SADIQ on admission, BUN/Scr 98/4.26 (baseline Scr 2), likely postrenal given patient's history of urinary retention vs prerenal in setting of poor po intake and hypovolemia since patient is on Lasix vs recent UTI (patient was being treated with Levaquin in [facility] for reported UTI for 7 days starting 8.13 UCx from 8/3 with kleb pneumo).    - Nephrology consult in AM  - bladder scan  - trend Cr, avoid nephrotoxic drugs, renally dose meds  - obtain ulytes  - F/u UA and Ucxm if UTI start treatment SADIQ on admission, BUN/Scr 98/4.26 (baseline Scr 2), likely postrenal given patient's history of urinary retention vs prerenal in setting of poor po intake and hypovolemia since patient is on Lasix vs recent UTI (patient was being treated with Levaquin in [facility] for reported UTI for 7 days starting 8.13 UCx from 8/3 with kleb pneumo).    - placed on rosenbaum   - urine output       - trend Cr, avoid nephrotoxic drugs, renally dose meds  - obtain ulytes  - F/u UA and Ucxm if UTI start treatment RESOLVED. Patient A&Ox1 on admission, unknown baseline, unclear if there is underlying dementia of if this is an acute process.  Daughter (HCP) reports she is estranged from father and does not know his current mental status.          # Elevated troponin   PMH constrictive CM  Patient with troponin 0.22 on admission which trended down to 0.2, likely elevated in setting of SADIQ and CKD.   - Repeat EKG xx  - troponin downtrending 0.2-> 0.18

## 2022-08-18 NOTE — SWALLOW VFSS/MBS ASSESSMENT ADULT - PHARYNGEAL PHASE COMMENTS
yes... Liquids spilled into the hypopharynx prior to the swallow d/t reduced lingual control. Inconsistent episodes of laryngeal penetration occurred prior to the swallow with thin liquids and mixed consistency (soft solid + liquid) 2/2 reduced bolus control and delayed swallow trigger. Laryngeal penetration/aspiration of these consistencies also occurred during the swallow as a result of inconsistent epiglottic retroflexion, mistimed laryngeal vestibular closure, and inefficient pharyngeal squeeze (which prevented ejection of penetrate). Aspiration was silent; cued cough response was weak and nonproductive.     Reduced tongue base to posterior pharyngeal wall contact resulted in bolus stasis along the base of tongue and within the vallecula (solids>purees>liquids).     A chin tuck maneuver was an effective strategy in reducing residue within the oropharynx and eliminating aspiration. Liquids spilled into the hypopharynx prior to the swallow d/t reduced lingual control. Inconsistent episodes of laryngeal penetration occurred prior to the swallow with thin liquids, puree, and mixed consistency (soft solid + liquid) 2/2 reduced bolus control and delayed swallow trigger. Laryngeal penetration/aspiration of these consistencies also occurred during the swallow as a result of inconsistent epiglottic retroflexion, mistimed laryngeal vestibular closure, and inefficient pharyngeal squeeze (which prevented ejection of penetrate). Aspiration was silent; cued cough response was weak and nonproductive.     Reduced tongue base to posterior pharyngeal wall contact resulted in bolus stasis along the base of tongue and within the vallecula (solids>purees>liquids).     A chin tuck maneuver was an effective strategy in reducing residue within the oropharynx and eliminating aspiration.

## 2022-08-18 NOTE — PROGRESS NOTE ADULT - PROBLEM SELECTOR PLAN 9
Patient with hypertension, home medications Coreg 3.125mg BID, Enalapril 5mg qd, Lasix 20mg qd, Hydralazine 50mg TID.  - Hold medications for now since patient was hypotensive

## 2022-08-18 NOTE — PROGRESS NOTE ADULT - PROBLEM SELECTOR PLAN 3
SADIQ on admission, BUN/Scr 98/4.26 (baseline Scr 2), likely postrenal given patient's history of urinary retention vs prerenal in setting of poor po intake and hypovolemia since patient is on Lasix vs recent UTI (patient was being treated with Levaquin in [place] for reported UTI for 7 days starting 8.13 UCx from 8/3 with kleb pneumo).    - placed on condom catheter     Plan:  - maintain on condom cath but still retaining  - bladder scan q6, straight cath if PVR>300  - trend Cr, avoid nephrotoxic drugs, renally dose meds  - F/u UA and Ucx, urine studies  - f/u renal U/s tomorrow  - renal consulted

## 2022-08-18 NOTE — CHART NOTE - NSCHARTNOTEFT_GEN_A_CORE
89 male CKD (baseline around 2.0)  around  admitted for sepsis 2/2 aspiration PNA was also found to be in acute urinary retention- nephrology consulted for SADIQ on CKD    #SADIQ   #hyponatremia, hypovoelmia  etologies include:   liekly iATN in the setting of sepsis given documented hypotension  pre-renal in the setting of poor PO intake  obstruction given history of BPH and urinary retention    Reccommend:  would obtain ABG to assess-acid-base disturbance and compensation   STAT bladder scan to r.o obstruction  UA urine studies (using urine studies order set)  IVF - would gently hydrate w/ NS @600cc/hour  BID BMP  would place rosenbaum for strict i's and o's for accurate monitoring given BPH and retention history   maintain MAP >70 for adequate renal perfusion  renal sono  avoid NSAIDS, PPIS and other nephrotoxic agents    full consult note to come in AM  Elva Chow D.O  PGY 5 nephrology fellow  [phone] 89 male CKD (baseline around 2.0)  around  admitted for sepsis 2/2 aspiration PNA was also found to be in acute urinary retention- nephrology consulted for SADIQ on CKD    #SADIQ   #hyponatremia, hypovoelmia  etologies include:   liekly iATN in the setting of sepsis given documented hypotension  pre-renal in the setting of poor PO intake  obstruction given history of BPH and urinary retention    Reccommend:  would obtain ABG to assess-acid-base disturbance and compensation   STAT bladder scan to r.o obstruction  UA urine studies (using urine studies order set)  IVF - would gently hydrate w/ NS @600cc/hour  will consider bicarb drip after ABG resulting given bicarb of 14  BID BMP  would place rosenbaum for strict i's and o's for accurate monitoring given BPH and retention history   maintain MAP >70 for adequate renal perfusion  renal sono  avoid NSAIDS, PPIS and other nephrotoxic agents    full consult note to come in AM  Elva Chow D.O  PGY 5 nephrology fellow  [phone]

## 2022-08-18 NOTE — PROGRESS NOTE ADULT - ASSESSMENT
ASSESSMENT/PLAN89 y/o male PMH TIA, restrictive CM, CHF, HTN, HLD, Afib (on Eliquis), CKD (baseline Scr 2.0), BPH, urinary retention, limited mobility, brought from [facility] for vomiting, hypoxia, sob, hypotension likely due to aspiration pneumonitis,  UTI, found to have SADIQ on admission.    1. O2 4LNC at this time  2. Bronchodilators:  Atrovent/ albuterol q 4 – 6 hours as needed  3. Corticosteroids: off  4. ID/Antibiotics: on Rocephin  5. Cardiac/HTN: optimize BP   6. GI: Rx/ prophylaxis c PPI/H2B  7. Heme: Rx/VT prophylaxis c SQH/SCD/AC pt on Eliquis  8. Aspiration precautions, Speech and Swallow evaluation  Discussed with managing team,

## 2022-08-18 NOTE — PROGRESS NOTE ADULT - PROBLEM SELECTOR PLAN 7
Hgb on admission 9.4 (baseline 8) normocytic, likely GM vs AOCD in setting of renal failure. Currently no signs of active bleeding (no hematochezia, melena, hemoptysis, hematuria)  - obtain iron panel  - trend CBC  - maintain active T&S  - transfuse if Hgb <7

## 2022-08-18 NOTE — PROGRESS NOTE ADULT - PROBLEM SELECTOR PLAN 2
Patient with recent cough, sob, hypoxia at [facility], [facility] with evidence of aspiration pneumonitis, likely in setting of vomiting.  Patient currently continues to cough but no longer hypoxic. No infiltrates or consolidations on imaging.     Plan:  - S&S cleared for minced and moist with thin liquids  - Maintained for aspiration precautions

## 2022-08-18 NOTE — CONSULT NOTE ADULT - ASSESSMENT
per Internal Medicine    89 y/o male PMH TIA, restrictive CM, CHF, HTN, HLD, Afib (on Eliquis), CKD (baseline Scr 2.0), BPH, urinary retention, limited mobility, brought from [facility] for oconcern of vomiting, hypoxia, sob, hypotension likely due to aspiration pneumonitis vs UTI, found to have SADIQ on admission.    Problem/Plan - 1:  ·  Problem: Sepsis.   ·  Plan: Patient meeting two SIRS critera HR 93 WBC 13.88 on admission possible sources aspiration pneumonitis and UTI.  Patient has UCx positive for the kleb pnuemo on 8/3 and was being treated with Levaquin since 8/13.  Abdominal source unlikely patient without diarrhea, no evidence of infection on CTAP.  Patient remains afebrile, hemodynamically stable   - improving leukocytosis    - Bcx NTD  - F/u Ucx, procal  - c/w zosyn 3.375.    Problem/Plan - 2:  ·  Problem: Nausea & vomiting.   ·  Plan: Patient with nausea and vomiting for two days, possibly in setting of uremia or UTI.  Other differential include viral gastroenteritis, no evidence of colitis or most severe GI infection on imaging and no reported diarrhea.  Cardiac etiology unlikely, patient without symptoms, no ischemia on EKG, trop likely up in setting of renal failure now peaked.  - CTH: no acute pathology     Plan:  - Continue Zofran PRN vomiting.    Problem/Plan - 3:  ·  Problem: Aspiration pneumonitis.   ·  Plan: Patient with recent cough, sob, hypoxia at [facility], CTAP with evidence of aspiration pneumonitis, likely in setting of vomiting.  Patient currently continues to cough but no longer hypoxic. No infiltrates or consolidations on imaging.     Plan:  - S&S cleared for minced and moist with thin liquids  - Maintained for aspiration precautions.    Problem/Plan - 4:  ·  Problem: Encephalopathy.   ·  Plan: Patient A&Ox1 on admission, unknown baseline, unclear if there is underlying dementia of if this is an acute process.  Daughter (HCP) reports she is estranged from father and does not know his current mental status.          # Elevated troponin   Patient with troponin 0.22 on admission which trended down to 0.2, likely elevated in setting of SADIQ and CKD.   - Repeat EKG xx  - troponin downtrending 0.2-> 0.18  - TTE: here is mild concentric left   ventricular hypertrophy. Abnormal septal motion seen due to a left bundle   branch block. Left ventricular systolic function is mildly reduced with a   calculated ejection fraction of 45-50% with global hypokinesis. Fibrocalcific tricuspid aortic valve without significant stenosis.    Problem/Plan - 5:  ·  Problem: Acute kidney injury superimposed on CKD.   ·  Plan: SADIQ on admission, BUN/Scr 98/4.26 (baseline Scr 2), likely postrenal given patient's history of urinary retention vs prerenal in setting of poor po intake and hypovolemia since patient is on Lasix vs recent UTI (patient was being treated with Levaquin in NH for reported UTI for 7 days starting 8.13 UCx from 8/3 with kleb pneumo).    - placed on rosenbaum   - urine output       - trend Cr, avoid nephrotoxic drugs, renally dose meds  - obtain ulytes  - F/u UA and Ucxm if UTI start treatment.    Problem/Plan - 6:  ·  Problem: BPH (benign prostatic hyperplasia).   ·  Plan: Patient with history of BPH complicated by urinary retention, home medications Finasteride 5mg qd, and Tamsulosin 0.4mg qd.   - Continue home medications.    Problem/Plan - 7:  ·  Problem: Anemia.   ·  Plan: Hgb on admission 9.4 (baseline 8) normocytic, likely GM vs AOCD in setting of renal failure. Currently no signs of active bleeding (no hematochezia, melena, hemoptysis, hematuria)  - obtain iron panel  - trend CBC  - maintain active T&S  - transfuse if Hgb <7.    Problem/Plan - 8:  ·  Problem: Chronic CHF.   ·  Plan: Patient with CHF, no previous echo, BNP on admission 2189, unclear if ischemic or nonischemic etiology but patient is documented to have restrictive cardiomyopathy  Home medications Coreg 3.125mg BID, Enalapril 5mg qd, Lasix 20mg qd, Hydralazine 50mg TID.  Patient clinically euvolemic on exam.  - Hold medications for now since patient was hypotensive and euvolemic, can restart one at a time  - TTE in AM  - strict I/Os, monitor UOP carefully  - daily weights.    Problem/Plan - 9:  ·  Problem: Hypertension.   ·  Plan: Patient with hypertension, home medications Coreg 3.125mg BID, Enalapril 5mg qd, Lasix 20mg qd, Hydralazine 50mg TID.  - Hold medications for now since patient was hypotensive.    Problem/Plan - 10:  ·  Problem: Hyperlipidemia.   ·  Plan; Patient with hyperlipidemia, home medication Lipitor 10mg qd.   - Continue home medication.    Problem/Plan - 11:  ·  Problem: Chronic atrial fibrillation.   ·  Plan: Patient with atrial fibrillation, home medication Eliquis 2.5 mg BID.   - Continue home medication.    Problem/Plan - 12:  ·  Problem: Preventive measure.   ·  Plan: F: s/p 500cc NS  E: caution with repletion given renal failure  N: pending S&S  GI: none  DVT: eliquis  Code: Full  Dispo: F.

## 2022-08-18 NOTE — PROGRESS NOTE ADULT - SUBJECTIVE AND OBJECTIVE BOX
Interventional, Pulmonary, Critical, Chest Special Procedures.    Pt was seen and fully examined by myself.     Time spent with patient in minutes:67    Patient is a 89y old  Male who presents with a chief complaint of aspiration PNA vs UTI (18 Aug 2022 07:33) The patient somnolent, arousable, answering simple questions, reporting non productive cough..     HPI:  89 y/o male PMH TIA, restrictive CM, CHF, HTN, HLD, Afib (on Eliquis), CKD (baseline Scr 2.0), BPH, urinary retention, limited mobility, brought from [facility] for concern of vomiting, hypoxia, sob, hypotension. Patient not providing history, obtained from chart and ED provider.  Patient with several episodes of NBNB vomiting since yesterday wich subsequently onset of shortness of breath and cough which has been progressively working. At baseline patient does not use any supplemental oxygen but was requiring 4L NC and was hypotensive to 100/60. Per records from [facility] no recent fever, chills, rhinorrhea, chest pain, palpitations, headaches, abdominal pain, diarrhea, constipation, dysuria, urgency, frequency.  HCP/daughter: Celina Bass [phone]    Hospital Course:  Vitals: T 97.7, HR 93, /64, RR 22, SaO2 96% 4L   Labs: WBC 13.88, Hgb 9.4, CO2 20, BUN 98, Scr 4.26, Albumin 3.0, AST 78, , Trop 0.22 --> 0.20, BNP 2189  CT Chest: 1. Possible aspiration pneumonitis worse on the right. 2. Old granulomatous disease. Mild atelectasis in the right lung base. Diffuse areas of subtle ground-glass mostly in the right lung possibly an aspiration pneumonitis given the history. Peripheral interstitial opacities possibly chronic fibrosis.  CTAP:  Multiple hepatic cysts. Gallstones with no definite evidence of acute cholecystitis. Bilateral renal cysts. Mild prominence of the right renal collecting system without obstructing stone. Colonic and duodenal diverticulosis.    EKG: sinus tachycardia with 1st degree heart block prolonged QRS  Intervention: Zofran 4mg IV, NS 1L, CTX 1g, Flagyl 500mg   Consult: none  (17 Aug 2022 04:13)      REVIEW OF SYSTEMS:    PAST MEDICAL & SURGICAL HISTORY:  Chronic CHF      Hypertension      Hyperlipidemia      Chronic kidney disease (CKD)      BPH (benign prostatic hyperplasia)      Chronic atrial fibrillation        FAMILY HISTORY:    SOCIAL HISTORY:      - Tobacco     - ETOH    Allergies    No Known Allergies    Intolerances      Vital Signs Last 24 Hrs  T(C): 36.4 (18 Aug 2022 11:08), Max: 37.1 (17 Aug 2022 18:54)  T(F): 97.5 (18 Aug 2022 11:08), Max: 98.7 (17 Aug 2022 18:54)  HR: 74 (18 Aug 2022 11:08) (50 - 79)  BP: 130/68 (18 Aug 2022 11:08) (92/40 - 130/68)  BP(mean): 68 (18 Aug 2022 06:00) (68 - 68)  RR: 18 (18 Aug 2022 11:08) (18 - 18)  SpO2: 96% (18 Aug 2022 11:08) (95% - 100%)    Parameters below as of 18 Aug 2022 11:08  Patient On (Oxygen Delivery Method): room air        08-17 @ 07:01  -  08-18 @ 07:00  --------------------------------------------------------  IN: 0 mL / OUT: 550 mL / NET: -550 mL          MEDICATIONS:  MEDICATIONS  (STANDING):  albuterol/ipratropium for Nebulization 3 milliLiter(s) Nebulizer every 6 hours  apixaban 2.5 milliGRAM(s) Oral every 12 hours  atorvastatin 10 milliGRAM(s) Oral at bedtime  finasteride 5 milliGRAM(s) Oral every 24 hours  piperacillin/tazobactam IVPB.. 2.25 Gram(s) IV Intermittent every 8 hours  sertraline 50 milliGRAM(s) Oral every 24 hours  tamsulosin 0.4 milliGRAM(s) Oral at bedtime    MEDICATIONS  (PRN):      PHYSICAL EXAM:  Constitutional: No fever, weight loss, chills + fatigue  Eyes: No eye pain, visual disturbances, or discharge  ENMT:  + difficulty hearing, tinnitus, vertigo; No sinus or throat pain. No epistaxis, +dysphagia, dysphonia, hoarseness no odynophagia  Neck: No pain, stiffness or neck swelling.  No masses or deformities  Respiratory: +cough, no wheezing, hemoptysis  - COPD  - ILD   - PE   - ASTHMA     - PNEUMONIA  Cardiovascular: No chest pain, AF dysrhythmia, palpitations, dizziness or edema - CAD   + CHF   + HTN  Gastrointestinal: No abdominal or epigastric pain. No nausea, vomiting or hematemesis; No diarrhea or constipation. No melena or hematochezia, Icterus.          Genitourinary: No dysuria, frequency, hematuria or incontinence   +CKD/SADIQ      - ESRD  Neurological: No headaches, memory loss, loss of strength, numbness or tremors      +DEMENTIA     - STROKE    - SEIZURE  Skin: No itching, burning, rashes or lesions   Lymph Nodes: No enlarged glands  Endocrine: No heat or cold intolerance; No hair loss       - DM     - THYROID DISORDER  Musculoskeletal: No joint pain or swelling; No muscle, back or extremity pain, No edema  Psychiatric: No depression, anxiety, mood swings or difficulty sleeping  Heme/Lymph: No easy bruising or bleeding gums         - ANEMIA      - CANCER   -COAGULOPATHY  Allergy and Immunologic: No hives or eczema  DEVICES:  - DENTURES   +IV R / L     - ETUBE   -TRACH   -CTUBE  R / L    LABS:                          7.2    11.24 )-----------( 308      ( 17 Aug 2022 08:43 )             22.1     08-17    139  |  109<H>  |  103<H>  ----------------------------<  94  4.6   |  18<L>  |  4.13<H>    Ca    9.0      17 Aug 2022 08:43  Phos  4.0     08-17  Mg     2.4     08-17    TPro  4.8<L>  /  Alb  2.6<L>  /  TBili  0.4  /  DBili  x   /  AST  53<H>  /  ALT  27  /  AlkPhos  34<L>  08-17    PT/INR - ( 16 Aug 2022 20:11 )   PT: 16.9 sec;   INR: 1.42          PTT - ( 16 Aug 2022 20:11 )  PTT:31.3 sec  RADIOLOGY & ADDITIONAL STUDIES (The following images were personally reviewed):

## 2022-08-18 NOTE — CONSULT NOTE ADULT - SUBJECTIVE AND OBJECTIVE BOX
Patient is a 89y old  Male who presents with a chief complaint of aspiration PNA vs UTI (18 Aug 2022 07:33)        HPI:  89 y/o male PMH TIA, restrictive CM, CHF, HTN, HLD, Afib (on Eliquis), CKD (baseline Scr 2.0), BPH, urinary retention, limited mobility, brought from [facility] for concern of vomiting, hypoxia, sob, hypotension. Patient not providing history, obtained from chart and ED provider.  Patient with several episodes of NBNB vomiting since yesterday wich subsequently onset of shortness of breath and cough which has been progressively working. At baseline patient does not use any supplemental oxygen but was requiring 4L NC and was hypotensive to 100/60. Per records from [facility] no recent fever, chills, rhinorrhea, chest pain, palpitations, headaches, abdominal pain, diarrhea, constipation, dysuria, urgency, frequency.  HCP/daughter: Celina Bass [phone]    Hospital Course:  Vitals: T 97.7, HR 93, /64, RR 22, SaO2 96% 4L   Labs: WBC 13.88, Hgb 9.4, CO2 20, BUN 98, Scr 4.26, Albumin 3.0, AST 78, , Trop 0.22 --> 0.20, BNP 2189  CT Chest: 1. Possible aspiration pneumonitis worse on the right. 2. Old granulomatous disease. Mild atelectasis in the right lung base. Diffuse areas of subtle ground-glass mostly in the right lung possibly an aspiration pneumonitis given the history. Peripheral interstitial opacities possibly chronic fibrosis.  CTAP:  Multiple hepatic cysts. Gallstones with no definite evidence of acute cholecystitis. Bilateral renal cysts. Mild prominence of the right renal collecting system without obstructing stone. Colonic and duodenal diverticulosis.    EKG: sinus tachycardia with 1st degree heart block prolonged QRS  Intervention: Zofran 4mg IV, NS 1L, CTX 1g, Flagyl 500mg   Consult: none  (17 Aug 2022 04:13)      PAST MEDICAL & SURGICAL HISTORY:  Chronic CHF      Hypertension      Hyperlipidemia      Chronic kidney disease (CKD)      BPH (benign prostatic hyperplasia)      Chronic atrial fibrillation          MEDICATIONS  (STANDING):  albuterol/ipratropium for Nebulization 3 milliLiter(s) Nebulizer every 6 hours  apixaban 2.5 milliGRAM(s) Oral every 12 hours  atorvastatin 10 milliGRAM(s) Oral at bedtime  finasteride 5 milliGRAM(s) Oral every 24 hours  piperacillin/tazobactam IVPB.. 2.25 Gram(s) IV Intermittent every 8 hours  sertraline 50 milliGRAM(s) Oral every 24 hours  tamsulosin 0.4 milliGRAM(s) Oral at bedtime    MEDICATIONS  (PRN):           FAMILY HISTORY:    CBC Full  -  ( 17 Aug 2022 08:43 )  WBC Count : 11.24 K/uL  RBC Count : 2.29 M/uL  Hemoglobin : 7.2 g/dL  Hematocrit : 22.1 %  Platelet Count - Automated : 308 K/uL  Mean Cell Volume : 96.5 fl  Mean Cell Hemoglobin : 31.4 pg  Mean Cell Hemoglobin Concentration : 32.6 gm/dL  Auto Neutrophil # : 10.46 K/uL  Auto Lymphocyte # : 0.19 K/uL  Auto Monocyte # : 0.58 K/uL  Auto Eosinophil # : 0.00 K/uL  Auto Basophil # : 0.00 K/uL  Auto Neutrophil % : 93.1 %  Auto Lymphocyte % : 1.7 %  Auto Monocyte % : 5.2 %  Auto Eosinophil % : 0.0 %  Auto Basophil % : 0.0 %      08-17    139  |  109<H>  |  103<H>  ----------------------------<  94  4.6   |  18<L>  |  4.13<H>    Ca    9.0      17 Aug 2022 08:43  Phos  4.0     08-17  Mg     2.4     08-17    TPro  4.8<L>  /  Alb  2.6<L>  /  TBili  0.4  /  DBili  x   /  AST  53<H>  /  ALT  27  /  AlkPhos  34<L>  08-17            Radiology :  < from: CT Head No Cont (08.17.22 @ 16:02) >  ACC: [id number] EXAM:  CT BRAIN                          PROCEDURE DATE:  08/17/2022          INTERPRETATION:  CLINICAL INDICATION: Altered mental status.    TECHNIQUE: CT of the head was performed without the administration of   intravenous contrast.    COMPARISON: None available.    FINDINGS:    There is no evidence of acute infarction, intracranial hemorrhage or mass   lesion.    There is hypoattenuation of the subcortical and periventricular white   matter, which is nonspecific finding, but most likely represents sequela   of chronic microvascular ischemic disease. There are atherosclerotic   calcifications of the cavernous and supraclinoid internal carotid   arteries bilaterally, and also the bilateral intradural vertebral   arteries. There is prominence of the cortical sulci related to underlying   brain parenchymal volume loss.    There is no evidence of hydrocephalus. There are no extra-axial fluid   collections.    The patient is status post bilateral lens replacement. The imaged  portions of the paranasal sinuses are aerated. There is a right mastoid   air cell effusion. The left mastoid air cells are clear. The visualized   soft tissues and osseous structures appear unremarkable.      IMPRESSION:    No acute intracranial findings.      < from: CT Chest No Cont (08.17.22 @ 00:16) >  ACC: [id number] EXAM:  CT CHEST                          PROCEDURE DATE:  08/17/2022          INTERPRETATION:  ATTENDING OVER READ:    AGREE WITH BELOW REPORT WITH THE FOLLOWING ADDITIONS:  1. 5 mm nodule within the inferior segment of the lingula. Limited   evaluation due to motion degradation in this region. This may represent a   fissural lymph node. As per Fleischner society 2017 guidelines, in a high   risk individual, optional 12 month surveillance thoracic CT is suggested.  2. Bilateral mosaic attenuation pattern which may represent air trapping   from underlying small airways inflammation as there are scattered   tree-in-bud micronodules.  3. Moderate calcified aortic mural plaque formation. 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 Periodic   interval surveillance may be of value.  4. Heavy coronary artery calcification/stents. Mild calcification of the   aortic valve and mitral annulus.      [facility] RADIOLOGIST PRELIMINARY REPORT      Initial report created on 8/17/2022 2:32:40 AM EDT  PROCEDURE INFORMATION:  Exam: CT Chest Without Contrast; Diagnostic  Exam date and time: 8/16/2022 11:54 PM  Age: 89 years old  Clinical indication: Shortness of breath and other: SOB after vomiting    TECHNIQUE:  Imaging protocol: Diagnostic computed tomography of the chest without   contrast.  3D rendering (Not supervised by radiologist): MIP and/or 3D reconstructed  images were created by the technologist.    COMPARISON:  No relevant prior studies available.    FINDINGS:  Lungs: Old granulomatous disease. Mild atelectasis in the right lung base.  Diffuse areas of subtle ground-glass mostly in the right lung possibly an  aspirationpneumonitis given the history. Peripheral interstitial   opacities  possibly chronic fibrosis.  Pleural spaces: Unremarkable. No pneumothorax. No pleural effusion.  Heart: Heart enlarged with severe coronary artery calcification.  Lymph nodes: Unremarkable. No enlarged lymph nodes.  Vasculature: Calcified plaque of the aorta.    Bones/joints: Bones osteopenic with degenerative changes.  Soft tissues: Unremarkable.    IMPRESSION:  1. Possible aspiration pneumonitis worse on the right.  2. Other findings as described.      < from: CT Abdomen and Pelvis No Cont (08.17.22 @ 00:16) >  ACC: [id number] EXAM:  CT ABDOMEN AND PELVIS                          PROCEDURE DATE:  08/17/2022          INTERPRETATION:  ATTENDING OVER READ:    AGREE WITH BELOW REPORT:      [facility] RADIOLOGIST PRELIMINARY REPORT      Initial report created on 8/17/2022 2:37:52 AM EDT  PROCEDURE INFORMATION:  Exam: CT Abdomen And Pelvis Without Contrast  Exam date and time: 8/16/2022 11:54 PM  Age: 89 years old  Clinical indication: Vomiting and other: Vomiting/sob    TECHNIQUE:  Imaging protocol: Computed tomography of the abdomen and pelvis without  contrast.    COMPARISON:  No relevant prior studies available.    FINDINGS:  Limitations: Exam limited by motion artifact.    Liver: Multiple hepatic cysts.  Gallbladder and bile ducts: Gallstones with no definite evidence of acute  cholecystitis.  Pancreas: Normal. No ductal dilation.  Spleen: Normal. No splenomegaly.  Adrenal glands: Normal. No mass.  Kidneys and ureters: Bilateral renal cysts. Mild prominence of the right   renal  collecting system without obstructing stone.  Stomach and bowel: Colonic and duodenal diverticulosis. No obvious bowel   wall  thickening or bowel obstruction.  Appendix: No evidence of appendicitis.    Intraperitoneal space: Unremarkable. No free air. No significant fluid  collection.  Vasculature: Moderate aortoiliac atherosclerotic disease without aneurysm.  Lymph nodes: Unremarkable. No enlarged lymph nodes.  Urinary bladder: Unremarkable as visualized.  Reproductive: Unremarkable as visualized.  Bones/joints: Bonesosteopenic with degenerative changes. No definite   acute  bony abnormality.  Soft tissues: Minimal subcutaneous edema overlying the bilateral flanks   greater  on the left.    IMPRESSION:  1. Exam limited by motion artifact.  2. No bowel obstruction.  3. Other findings as described.                  Vital Signs Last 24 Hrs  T(C): 36.4 (18 Aug 2022 11:08), Max: 37.1 (17 Aug 2022 18:54)  T(F): 97.5 (18 Aug 2022 11:08), Max: 98.7 (17 Aug 2022 18:54)  HR: 74 (18 Aug 2022 11:08) (50 - 79)  BP: 130/68 (18 Aug 2022 11:08) (92/40 - 130/68)  BP(mean): 68 (18 Aug 2022 06:00) (68 - 68)  RR: 18 (18 Aug 2022 11:08) (18 - 18)  SpO2: 96% (18 Aug 2022 11:08) (95% - 100%)    Parameters below as of 18 Aug 2022 11:08  Patient On (Oxygen Delivery Method): room air            REVIEW OF SYSTEMS:  per HPI            Physical Exam:  89 y o man lying in semi Amado's position , awake , NAD      Neurologic Exam:    Alert and oriented to person     Cranial Nerves:     II :                         pupils equal , round and reactive to light , visual fields intact   III/ IV/VI :              extraocular movements intact , neg nystagmus , neg ptosis  V :                        facial sensation intact , V1-3 normal  VII :                      face symmetric , no droop , normal eye closure and smile  VIII :                     hearing intact to finger rub bilaterally  IX and X :             no hoarseness , gag intact , palate/ uvula rise symmetrically  XI :                       SCM / trapezius strength intact bilateral  XII :                      no tongue deviation    Motor Exam:    Right UE:               no focal weakness ,  > 3+/5 throughout                                 Left UE:                 no focal weakness,  > 3+/5 throughout        Right LE:                no focal weakness,  > 3+/5 throughout      Left LE:                  no focal weakness,  > 3+/5 throughout            Sensation:         intact to light touch x 4 extremities                         DTR :                     biceps/brachioradialis : equal                                              patella/ankle : equal     Tandem Walking :  not tested    Gait :  not tested              PM&R Impression :     1) deconditioned    2) no focal weakness      Recommendations / Plan :     1) Physical / Occupational therapy focusing on therapeutic exercises , equipment evaluation , bed mobility/transfer out of bed evaluation , progressive ambulation with assistive devices prn .    2) Disposition Plan / Recs  :   return to [facility]

## 2022-08-18 NOTE — CONSULT NOTE ADULT - ASSESSMENT
89 y/o male PMH TIA, restrictive CM, CHF, HTN, HLD, Afib (on Eliquis), CKD (baseline Scr 2.0), BPH, urinary retention, limited mobility, brought from [facility] for concern of vomiting, hypoxia, sob, hypotension. Urology consulted for rosenbaum placement after primary team unable to CIC X2.

## 2022-08-18 NOTE — PROGRESS NOTE ADULT - PROBLEM SELECTOR PLAN 4
Patient A&Ox1 on admission, unknown baseline, unclear if there is underlying dementia of if this is an acute process.  Daughter (HCP) reports she is estranged from father and does not know his current mental status.    - F/u CTH  - Obtain collateral from nursing home regarding patient's baseline      # Elevated troponin   Patient with troponin 0.22 on admission which trended down to 0.2, likely elevated in setting of SADIQ and CKD.   - F/u repeat EKG and troponin at 10am Patient A&Ox1 on admission, unknown baseline, unclear if there is underlying dementia of if this is an acute process.  Daughter (HCP) reports she is estranged from father and does not know his current mental status.          # Elevated troponin   Patient with troponin 0.22 on admission which trended down to 0.2, likely elevated in setting of SADIQ and CKD.   - Repeat EKG xx  - troponin downtrending 0.2-> 0.18  - TTE: here is mild concentric left   ventricular hypertrophy. Abnormal septal motion seen due to a left bundle   branch block. Left ventricular systolic function is mildly reduced with a   calculated ejection fraction of 45-50% with global hypokinesis. Fibrocalcific tricuspid aortic valve without significant stenosis RESOLVED. Patient with nausea and vomiting for two days, possibly in setting of uremia or UTI.  Other differential include viral gastroenteritis, no evidence of colitis or most severe GI infection on imaging and no reported diarrhea.  Cardiac etiology unlikely, patient without symptoms, no ischemia on EKG, trop likely up in setting of renal failure now peaked.  - CTH: no acute pathology     Plan:  - Continue Zofran PRN vomiting

## 2022-08-18 NOTE — PROGRESS NOTE ADULT - PROBLEM SELECTOR PLAN 3
Patient with recent cough, sob, hypoxia at [facility], [facility] with evidence of aspiration pneumonitis, likely in setting of vomiting.  Patient currently continues to cough but no longer hypoxic. No infiltrates or consolidations on imaging.   - Monitor off antibiotics at this time, if becomes febrile consider starting treatment for pneumonia.   - S&S eval in AM  - Aspiration precautions Patient with recent cough, sob, hypoxia at [facility], [facility] with evidence of aspiration pneumonitis, likely in setting of vomiting.  Patient currently continues to cough but no longer hypoxic. No infiltrates or consolidations on imaging.     Plan:  - S&S cleared for minced and moist with thin liquids  - Maintained for aspiration precautions SADIQ on admission, BUN/Scr 98/4.26 (baseline Scr 2), likely postrenal given patient's history of urinary retention vs prerenal in setting of poor po intake and hypovolemia since patient is on Lasix vs recent UTI (patient was being treated with Levaquin in NH for reported UTI for 7 days starting 8.13 UCx from 8/3 with kleb pneumo).    - placed on condom catheter     Plan:  - maintain on condom cath but still retaining  - bladder scan q6, straight cath if PVR>300  - trend Cr, avoid nephrotoxic drugs, renally dose meds  - F/u UA and Ucx, ulytes  - f/u renal U/s tomorrow  - renal consulted SADIQ on admission, BUN/Scr 98/4.26 (baseline Scr 2), likely postrenal given patient's history of urinary retention vs prerenal in setting of poor po intake and hypovolemia since patient is on Lasix vs recent UTI (patient was being treated with Levaquin in [facility] for reported UTI for 7 days starting 8.13 UCx from 8/3 with kleb pneumo).    - placed on condom catheter     Plan:  - maintain on condom cath but still retaining  - bladder scan q6, straight cath if PVR>300  - trend Cr, avoid nephrotoxic drugs, renally dose meds  - F/u UA and Ucx, urine studies  - f/u renal U/s tomorrow  - renal consulted

## 2022-08-18 NOTE — SWALLOW VFSS/MBS ASSESSMENT ADULT - SLP PERTINENT HISTORY OF CURRENT PROBLEM
PMH TIA, restrictive CM, CHF, HTN, HLD, Afib (on Eliquis), CKD (baseline Scr 2.0), BPH, urinary retention, limited mobility, brought from [facility] for oconcern of vomiting, hypoxia, sob, hypotension likely due to aspiration pneumonitis vs UTI, found to have SADIQ on admission.

## 2022-08-18 NOTE — PHYSICAL THERAPY INITIAL EVALUATION ADULT - ADDITIONAL COMMENTS
Patient A&O x2 (name and  only), so unable to obtain accurate PLOF from patient directly. However, patient SW notes and [facility] chart, patient previously w/c bound at baseline requiring assist for all ADLs except feeding. Patient previously was a long-term care resident at [facility].

## 2022-08-18 NOTE — PROGRESS NOTE ADULT - PROBLEM SELECTOR PLAN 8
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  Patient clinically euvolemic on exam.  TTE: LVEF 45-50% with global hypokinesis. Abnormal septal motion seen due to a left bundle branch block. Mild MR, mild AR, trivial PCD effusion  - Euvolemic on exam, - JVD, - b/l L/E edema, current b/l base crackles likely secondary to asp PNA    Plan:   - strict I/Os, monitor UOP carefully

## 2022-08-19 NOTE — ADVANCED PRACTICE NURSE CONSULT - ASSESSMENT
Patient with an evolving deep tissue pressure injury to his sacrum measuing 8x12x0.1cm with 20% dermal tissue exposed, and 80% dark purple non-blanching skin. Periwound intact. Cleansed and applied Cavilon to intact skin and zinc oxide barrier cream to open area. Covered with foam. Offloaded with pillows.  To right heel patient with a deep tissue pressure injury measuring 2x1.5cm with dark purple non-blanching intact skin. Wiped with Cavilon and applied offloading heel boots.  To left heel patient with a deep tissue pressure injury measuring 2.5x2cm with dark purple non-blanching intact skin. Wiped with Cavilon and applied offloading heel boots.

## 2022-08-19 NOTE — PROGRESS NOTE ADULT - PROBLEM SELECTOR PLAN 8
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  Patient clinically euvolemic on exam.  TTE: LVEF 45-50% with global hypokinesis. Abnormal septal motion seen due to a left bundle branch block. Mild MR, mild AR, trivial PCD effusion  - Euvolemic on exam, - JVD, - b/l L/E edema, current b/l base crackles likely secondary to asp PNA    Plan:   - strict I/Os, monitor UOP carefully Hgb on admission 9.4 (baseline 8) normocytic, likely GM vs AOCD in setting of renal failure. Currently no signs of active bleeding (no hematochezia, melena, hemoptysis, hematuria)  - obtain iron panel  - trend CBC  - maintain active T&S (8/19)  - transfuse if Hgb <7 Hgb on admission 9.4 (baseline 8) normocytic, likely GM vs AOCD in setting of renal failure. Currently no signs of active bleeding (no hematochezia, melena, hemoptysis, hematuria)  - f/u iron panel  - trend CBC  - maintain active T&S (8/19)  - transfuse if Hgb <7

## 2022-08-19 NOTE — PROGRESS NOTE ADULT - PROBLEM SELECTOR PLAN 12
F: none  E: caution with repletion given renal failure  N: minced and moist  GI: hold in setting of SADIQ  Bowel: miralax, senna  DVT: eliquis 2.5 BID  Code: DNR  Dispo: FORTUNATO Patient with atrial fibrillation, home medication Eliquis 2.5 mg BID.   - Continue home medication

## 2022-08-19 NOTE — DISCHARGE NOTE PROVIDER - NSDCHHENCOUNTER_GEN_ALL_CORE
Problem: Pediatric Inpatient Plan of Care  Goal: Plan of Care Review  Outcome: Ongoing (interventions implemented as appropriate)  Mother at bedside. VSS; T-max 101.8 F. PRN Tylenol given x1; relief noted. Zofran given prior to Tamiflu to prevent N/V. Tamiflu tolerated without N/V. D5NS infusing @ 45 mL/hr per order. Minimal PO fluid intake noted, couple sips of Powerade. Only 1 wet diaper during shift; no BM this shift. Reviewed plan of care with mother who verbalized understanding. NAD noted. Will continue to monitor.       WDL 25-Aug-2022

## 2022-08-19 NOTE — PROGRESS NOTE ADULT - ASSESSMENT
ASSESSMENT/PLAN89 y/o male PMH TIA, restrictive CM, CHF, HTN, HLD, Afib (on Eliquis), CKD (baseline Scr 2.0), BPH, urinary retention, limited mobility, brought from [facility] for vomiting, hypoxia, sob, hypotension likely due to aspiration pneumonitis,  UTI, found to have SADIQ on admission.    1. O2 4LNC at this time  2. Bronchodilators:  Atrovent/ albuterol q 4 – 6 hours as needed  3. Corticosteroids: off  4. ID/Antibiotics: continue Zosyn   5. Cardiac/HTN: optimize BP   6. GI: Rx/ prophylaxis c PPI/H2B  7. Heme: Rx/VT prophylaxis c SQH/SCD/AC pt on Eliquis  8. Aspiration precautions, Speech and Swallow feedback

## 2022-08-19 NOTE — ADVANCED PRACTICE NURSE CONSULT - REASON FOR CONSULT
Pressure injuries, present on arrival    Hospital Course	  89 y/o male PMH TIA, restrictive CM, CHF, HTN, HLD, Afib (on Eliquis), CKD (baseline Scr 2.0), BPH, urinary retention, limited mobility, brought from [facility] for oconcern of vomiting, hypoxia, sob, hypotension likely due to aspiration pneumonia and SADIQ on CKD.     $Acute kidney injury superimposed on CKD.   Patient presented with SADIQ on admission, BUN/Scr 98/4.26 (baseline Scr 2), likely postrenal given patient's history of urinary retention vs prerenal in setting of poor po intake and hypovolemia since patient is on Lasix vs recent UTI (patient was being treated with Levaquin in NH for reported UTI for 7 days starting 8.13 UCx from 8/3 with kleb pneumo. Patient found to be retaining with PVRs 350-400. Urology unable to place bedside Epstein due to false passage in prostate, recommended cystoscopy assisted Epstein.  - Presenting with metabolic acidosis likely secondary to elevated BUN   - FENa: 1.0% prerenal, Francine<20: patient dry   - urinary output:

## 2022-08-19 NOTE — PROGRESS NOTE ADULT - PROBLEM SELECTOR PLAN 9
Patient with hypertension, home medications Coreg 3.125mg BID, Enalapril 5mg qd, Lasix 20mg qd, Hydralazine 50mg TID.  - Hold medications for now since patient was hypotensive Patient with CHF, no previous echo, BNP on admission 2189, unclear if ischemic or nonischemic etiology but patient is documented to have restrictive cardiomyopathy  Home medications Coreg 3.125mg BID, Enalapril 5mg qd, Lasix 20mg qd, Hydralazine 50mg TID.  Patient clinically euvolemic on exam.  TTE: LVEF 45-50% with global hypokinesis. Abnormal septal motion seen due to a left bundle branch block. Mild MR, mild AR, trivial PCD effusion  - Euvolemic on exam, - JVD, - b/l L/E edema, current b/l base crackles likely secondary to asp PNA    Plan:   - strict I/Os, monitor UOP carefully

## 2022-08-19 NOTE — CONSULT NOTE ADULT - ATTENDING COMMENTS
have reviewed above and discussed at length.  agree that the liliane on ckd is likely multifactorial, including sepsis, hypotension, and dehydration , and evidence of retention for which pt refuses guided relief via  recommendation.    concur with approach to rehydrate, but inability to anticipate increased uo as volume improves limits the ability to continue cautious hydration.    pt and family have not been receptive to  intervention for placing rosenbaum, and it may be helpful to have Palliative Medicine underscore the extent to which this is a tactical decision for comfort, as opposed to a decision to maximize success and survivial. have discussed with staff.   agree with about observations and recommendations.

## 2022-08-19 NOTE — DISCHARGE NOTE PROVIDER - CARE PROVIDER_API CALL
Vega Jefferson)  Internal Medicine  [address] [address]  Phone: [phone]  Fax: [phone]  Follow Up Time: 2 weeks   Vega Jefferson)  Internal Medicine  [address] [address]  Phone: [phone]  Fax: [phone]  Follow Up Time: 2 weeks    Aidan Manzanares)  Internal Medicine; Nephrology  [address], [facility] [address]  [place] [address]  Phone: [phone]  Fax: [phone]  Follow Up Time: 2 weeks   Vega Jefferson)  Internal Medicine  [address] [address]  Phone: [phone]  Fax: [phone]  Scheduled Appointment: 09/12/2022 11:30 AM    Aidan Manzanares)  Internal Medicine; Nephrology  [address], [address]  [place] [address]  Phone: [phone]  Fax: [phone]  Scheduled Appointment: 10/03/2022 11:40 AM   Vega Jefferson)  Internal Medicine  [address] [address]  Phone: [phone]  Fax: [phone]  Scheduled Appointment: 09/12/2022 11:30 AM    Aidan Manzanares)  Internal Medicine; Nephrology  [address], [address]  [place] [address]  Phone: [phone]  Fax: [phone]  Scheduled Appointment: 10/03/2022 11:40 AM    Robert Phipps)  Urology  [address]  [place], [place] [address]  Phone: [phone]  Fax: [phone]  Scheduled Appointment: 09/06/2022   Vega Jefferson)  Internal Medicine  [address] [address]  Phone: [phone]  Fax: [phone]  Scheduled Appointment: 09/12/2022 11:30 AM    Aidan Manzanares)  Internal Medicine; Nephrology  [address], [address]  [place] [address]  Phone: [phone]  Fax: [phone]  Scheduled Appointment: 10/03/2022 11:40 AM    Robert Phipps)  Urology  [address]  [place], [place] [address]  Phone: [phone]  Fax: [phone]  Scheduled Appointment: 09/06/2022 03:40 PM

## 2022-08-19 NOTE — PROGRESS NOTE ADULT - PROBLEM SELECTOR PLAN 5
RESOLVED. Patient A&Ox1 on admission, unknown baseline, unclear if there is underlying dementia of if this is an acute process.  Daughter (HCP) reports she is estranged from father and does not know his current mental status.          # Elevated troponin   PMH constrictive CM  Patient with troponin 0.22 on admission which trended down to 0.2, likely elevated in setting of SADIQ and CKD.   - troponin downtrending 0.2-> 0.18

## 2022-08-19 NOTE — DISCHARGE NOTE PROVIDER - PROVIDER TOKENS
PROVIDER:[TOKEN:[[id number]],FOLLOWUP:[2 weeks]] PROVIDER:[TOKEN:[[id number]],FOLLOWUP:[2 weeks]],PROVIDER:[TOKEN:[[id number]],FOLLOWUP:[2 weeks]] PROVIDER:[TOKEN:[[id number]],SCHEDULEDAPPT:[09/12/2022],SCHEDULEDAPPTTIME:[11:30 AM]],PROVIDER:[TOKEN:[[id number]],SCHEDULEDAPPT:[10/03/2022],SCHEDULEDAPPTTIME:[11:40 AM]] PROVIDER:[TOKEN:[[id number]],SCHEDULEDAPPT:[09/12/2022],SCHEDULEDAPPTTIME:[11:30 AM]],PROVIDER:[TOKEN:[[id number]],SCHEDULEDAPPT:[10/03/2022],SCHEDULEDAPPTTIME:[11:40 AM]],PROVIDER:[[id number]],SCHEDULEDAPPT:[09/06/2022]] PROVIDER:[TOKEN:[[id number]],SCHEDULEDAPPT:[09/12/2022],SCHEDULEDAPPTTIME:[11:30 AM]],PROVIDER:[[id number]],SCHEDULEDAPPT:[10/03/2022],SCHEDULEDAPPTTIME:[11:40 AM]],PROVIDER:[[id number]],SCHEDULEDAPPT:[09/06/2022],SCHEDULEDAPPTTIME:[03:40 PM]]

## 2022-08-19 NOTE — PROGRESS NOTE ADULT - PROBLEM SELECTOR PLAN 2
Patient meeting two SIRS critera HR 93 WBC 13.88 on admission possible sources aspiration pneumonitis and UTI.  Patient has UCx positive for the kleb pnuemo on 8/3 and was being treated with Levaquin since 8/13.  Abdominal source unlikely patient without diarrhea, no evidence of infection on CTAP.  Patient remains afebrile, hemodynamically stable   - improving leukocytosis, procal 1.94    - c/w zosyn 2.25 q8 (8/18-8/25) adjusted for age   - Bcx NTD x 2 days  - F/u UA/ Ucx unable to straight cath   - c/w vest PT BID

## 2022-08-19 NOTE — DISCHARGE NOTE PROVIDER - NSDCFUADDAPPT_GEN_ALL_CORE_FT
Please bring your Insurance card, Photo ID and Discharge paperwork to the following appointments:    (1) Please follow up with your Primary Care Provider, Dr. Vega Jefferson at [address] [place] [address] on 09/12/2022 at 11:30am.    Appointment was scheduled by Ms. HANNAH Giraldo, Referral Coordinator.    (2) Please follow up with your Nephrology Provider, Aidan Manzanares at [address] [place] [address], [address] [address] on 10/03/2022 at 11:40am.    Appointment was scheduled by Ms. HANNAH Giraldo, Referral Coordinator.   Please arrive 15 minutes early,  bring your Insurance card, Current List of Medications, Photo ID and Discharge paperwork to the following appointments:    (1) Please follow up with your Urology Provider, Dr. Robert Phipps at [address] [place] [address] on 09/06/2022 at 3:40pm.    Appointment was scheduled by Ms. HANNAH Giraldo, Referral Coordinator.    (2) Please follow up with your Primary Care Provider, Dr. Vega Jefferson at [address] [place] [address] on 09/12/2022 at 11:30am.    Appointment was scheduled by Ms. HANNAH Giraldo, Referral Coordinator.    (3) Please follow up with your Nephrology Provider, Aidan Manzanares at [address], [address] [address] on 10/03/2022 at 11:40am.    Appointment was scheduled by Ms. HANNAH Giraldo, Referral Coordinator.

## 2022-08-19 NOTE — CONSULT NOTE ADULT - SUBJECTIVE AND OBJECTIVE BOX
Patient is a 89y old  Male who presents with a chief complaint of sespsis (18 Aug 2022 21:25)      HPI:  89 y/o male PMH TIA, restrictive CM, CHF, HTN, HLD, Afib (on Eliquis), CKD (baseline Scr 2.0), BPH, urinary retention, limited mobility, brought from [facility] for concern of vomiting, hypoxia, sob, hypotension. At baseline patient does not use any supplemental oxygen but was requiring 4L NC and was hypotensive to 100/60. CT shows possible aspiration pna. Started on levaquin which pt was taking outpatient but switced to zosyn. Presented w/ SADIQ of unclear etiology vs multifactorial. Bladder scan shows retention, does have known BPH, unable to place rosenbaum with Urology and pt refusing cystoscopy guided rosenbaum.     Nephrology consulted for SADIQ on CKD.      PAST MEDICAL & SURGICAL HISTORY:  Chronic CHF      Hypertension      Hyperlipidemia      Chronic kidney disease (CKD)      BPH (benign prostatic hyperplasia)      Chronic atrial fibrillation            Allergies:  No Known Allergies      Home Medications:   acetaminophen     Tablet .. 650 milliGRAM(s) Oral every 6 hours PRN  albuterol/ipratropium for Nebulization 3 milliLiter(s) Nebulizer every 6 hours  apixaban 2.5 milliGRAM(s) Oral every 12 hours  atorvastatin 10 milliGRAM(s) Oral at bedtime  finasteride 5 milliGRAM(s) Oral every 24 hours  lactated ringers. 500 milliLiter(s) IV Continuous <Continuous>  piperacillin/tazobactam IVPB.. 2.25 Gram(s) IV Intermittent every 8 hours  polyethylene glycol 3350 17 Gram(s) Oral daily  senna 2 Tablet(s) Oral at bedtime  sertraline 50 milliGRAM(s) Oral every 24 hours  tamsulosin 0.4 milliGRAM(s) Oral at bedtime      Hospital Medications:   MEDICATIONS  (STANDING):  albuterol/ipratropium for Nebulization 3 milliLiter(s) Nebulizer every 6 hours  apixaban 2.5 milliGRAM(s) Oral every 12 hours  atorvastatin 10 milliGRAM(s) Oral at bedtime  finasteride 5 milliGRAM(s) Oral every 24 hours  lactated ringers. 500 milliLiter(s) (50 mL/Hr) IV Continuous <Continuous>  piperacillin/tazobactam IVPB.. 2.25 Gram(s) IV Intermittent every 8 hours  polyethylene glycol 3350 17 Gram(s) Oral daily  senna 2 Tablet(s) Oral at bedtime  sertraline 50 milliGRAM(s) Oral every 24 hours  tamsulosin 0.4 milliGRAM(s) Oral at bedtime      SOCIAL HISTORY:  Denies ETOh, smoking, or drug use.     Family History:  FAMILY HISTORY:        VITALS:  T(F): 97.7 (08-19-22 @ 05:00), Max: 98 (08-18-22 @ 22:20)  HR: 93 (08-19-22 @ 05:00)  BP: 94/53 (08-19-22 @ 05:00)  RR: 18 (08-19-22 @ 05:00)  SpO2: 99% (08-19-22 @ 05:00)  Wt(kg): --    08-18 @ 07:01  -  08-19 @ 07:00  --------------------------------------------------------  IN: 1500 mL / OUT: 750 mL / NET: 750 mL        CAPILLARY BLOOD GLUCOSE          Review of Systems:  CONSTITUTIONAL: No fever or weight loss   RESPIRATORY: No shortness of breath, cough  CARDIOVASCULAR: No Chest pain, shortness of breath   GASTROINTESTINAL: No abdominal pain, nausea, vomiting, diarrhea  GENITOURINARY: No urinary frequency, gross hematuria, dysuria  NEUROLOGICAL: No headache, weakness  SKIN: No rash or skin lesion  MUSCULOSKELETAL: No swelling or pain   Psych: Denies suicidal or homicidal ideation     PHYSICAL EXAM:  GENERAL: Alert, awake, oriented x3   HEENT: [place], EOMI, neck supple, no JVP  CHEST/LUNG: Bilateral clear breath sounds  HEART: Regular rate and rhythm, no murmur, no gallops, no rub   ABDOMEN: Soft, nontender, non distended  : No flank or supra pubic tenderness.  EXTREMITIES: no pedal edema  Neurology: AAOx3, moves all extremities   SKIN: No rash or skin lesion     LABS:  08-19    134<L>  |  103  |  96<H>  ----------------------------<  106<H>  4.5   |  19<L>  |  4.01<H>    Ca    8.9      19 Aug 2022 05:30  Phos  3.8     08-19  Mg     2.2     08-19    TPro  4.7<L>  /  Alb  2.1<L>  /  TBili  0.5  /  DBili      /  AST  60<H>  /  ALT  25  /  AlkPhos  33<L>  08-19    Creatinine Trend: 4.01 <--, 4.41 <--, 4.13 <--, 4.27 <--, 4.26 <--                        7.8    7.62  )-----------( 251      ( 19 Aug 2022 05:30 )             23.5     Urine Studies:    Sodium, Random Urine: <20 mmol/L (08-19 @ 06:51)  Creatinine, Random Urine: 62 mg/dL (08-19 @ 06:51)  Protein/Creatinine Ratio Calculation: 0.6 Ratio (08-19 @ 06:51)  Potassium, Random Urine: 48 mmol/L (08-19 @ 06:51) Patient is a 89y old  Male who presents with a chief complaint of sespsis (18 Aug 2022 21:25)      HPI:  89 y/o male PMH TIA, restrictive CM, CHF, HTN, HLD, Afib (on Eliquis), CKD (baseline Scr 2.0), BPH, urinary retention, limited mobility, brought from [facility] for concern of vomiting, hypoxia, sob, hypotension. At baseline patient does not use any supplemental oxygen but was requiring 4L NC and was hypotensive to 100/60. CT shows possible aspiration pna. Started on levaquin which pt was taking outpatient but switced to zosyn. Presented w/ SADIQ of unclear etiology vs multifactorial. Bladder scan shows retention, does have known BPH, unable to place rosenbaum with Urology and pt refusing cystoscopy guided rosenbaum.     Nephrology consulted for SADIQ on CKD.      PAST MEDICAL & SURGICAL HISTORY:  Chronic CHF      Hypertension      Hyperlipidemia      Chronic kidney disease (CKD)      BPH (benign prostatic hyperplasia)      Chronic atrial fibrillation            Allergies:  No Known Allergies      Home Medications:   acetaminophen     Tablet .. 650 milliGRAM(s) Oral every 6 hours PRN  albuterol/ipratropium for Nebulization 3 milliLiter(s) Nebulizer every 6 hours  apixaban 2.5 milliGRAM(s) Oral every 12 hours  atorvastatin 10 milliGRAM(s) Oral at bedtime  finasteride 5 milliGRAM(s) Oral every 24 hours  lactated ringers. 500 milliLiter(s) IV Continuous <Continuous>  piperacillin/tazobactam IVPB.. 2.25 Gram(s) IV Intermittent every 8 hours  polyethylene glycol 3350 17 Gram(s) Oral daily  senna 2 Tablet(s) Oral at bedtime  sertraline 50 milliGRAM(s) Oral every 24 hours  tamsulosin 0.4 milliGRAM(s) Oral at bedtime      Hospital Medications:   MEDICATIONS  (STANDING):  albuterol/ipratropium for Nebulization 3 milliLiter(s) Nebulizer every 6 hours  apixaban 2.5 milliGRAM(s) Oral every 12 hours  atorvastatin 10 milliGRAM(s) Oral at bedtime  finasteride 5 milliGRAM(s) Oral every 24 hours  lactated ringers. 500 milliLiter(s) (50 mL/Hr) IV Continuous <Continuous>  piperacillin/tazobactam IVPB.. 2.25 Gram(s) IV Intermittent every 8 hours  polyethylene glycol 3350 17 Gram(s) Oral daily  senna 2 Tablet(s) Oral at bedtime  sertraline 50 milliGRAM(s) Oral every 24 hours  tamsulosin 0.4 milliGRAM(s) Oral at bedtime      SOCIAL HISTORY:  Denies ETOh, smoking, or drug use.     Family History:  FAMILY HISTORY:        VITALS:  T(F): 97.7 (08-19-22 @ 05:00), Max: 98 (08-18-22 @ 22:20)  HR: 93 (08-19-22 @ 05:00)  BP: 94/53 (08-19-22 @ 05:00)  RR: 18 (08-19-22 @ 05:00)  SpO2: 99% (08-19-22 @ 05:00)  Wt(kg): --    08-18 @ 07:01  -  08-19 @ 07:00  --------------------------------------------------------  IN: 1500 mL / OUT: 750 mL / NET: 750 mL        CAPILLARY BLOOD GLUCOSE          Review of Systems:  CONSTITUTIONAL: No fever or weight loss   RESPIRATORY: No shortness of breath, cough  CARDIOVASCULAR: No Chest pain, shortness of breath   GASTROINTESTINAL: No abdominal pain, nausea, vomiting, diarrhea  GENITOURINARY: No urinary frequency, gross hematuria, dysuria  NEUROLOGICAL: No headache, weakness  SKIN: No rash or skin lesion  MUSCULOSKELETAL: No swelling or pain   Psych: Denies suicidal or homicidal ideation     PHYSICAL EXAM:  GENERAL: laying in bed, NAD  HEENT: neck supple, no JVP  CHEST/LUNG:   HEART: Regular rate and rhythm, no murmur, no gallops, no rub   ABDOMEN: Soft, nontender, non distended  : No flank or supra pubic tenderness.  EXTREMITIES: no pedal edema  Neurology: AAOx3      LABS:  08-19    134<L>  |  103  |  96<H>  ----------------------------<  106<H>  4.5   |  19<L>  |  4.01<H>    Ca    8.9      19 Aug 2022 05:30  Phos  3.8     08-19  Mg     2.2     08-19    TPro  4.7<L>  /  Alb  2.1<L>  /  TBili  0.5  /  DBili      /  AST  60<H>  /  ALT  25  /  AlkPhos  33<L>  08-19    Creatinine Trend: 4.01 <--, 4.41 <--, 4.13 <--, 4.27 <--, 4.26 <--                        7.8    7.62  )-----------( 251      ( 19 Aug 2022 05:30 )             23.5     Urine Studies:    Sodium, Random Urine: <20 mmol/L (08-19 @ 06:51)  Creatinine, Random Urine: 62 mg/dL (08-19 @ 06:51)  Protein/Creatinine Ratio Calculation: 0.6 Ratio (08-19 @ 06:51)  Potassium, Random Urine: 48 mmol/L (08-19 @ 06:51) Patient is a 89y old  Male who presents with a chief complaint of sespsis (18 Aug 2022 21:25)      HPI:  89 y/o male PMH TIA, restrictive CM, CHF, HTN, HLD, Afib (on Eliquis), CKD (baseline Scr 2.0), BPH, urinary retention, limited mobility, brought from [facility] for concern of vomiting, hypoxia, sob, hypotension. At baseline patient does not use any supplemental oxygen but was requiring 4L NC and was hypotensive to 100/60. CT shows possible aspiration pna. Started on levaquin which pt was taking outpatient but switced to zosyn. Presented w/ SADIQ of unclear etiology vs multifactorial. Bladder scan shows retention, does have known BPH, unable to place rosenbaum with Urology and pt refusing cystoscopy guided rosenbaum.     Nephrology consulted for SADIQ on CKD.      PAST MEDICAL & SURGICAL HISTORY:  Chronic CHF      Hypertension      Hyperlipidemia      Chronic kidney disease (CKD)      BPH (benign prostatic hyperplasia)      Chronic atrial fibrillation            Allergies:  No Known Allergies      Home Medications:   acetaminophen     Tablet .. 650 milliGRAM(s) Oral every 6 hours PRN  albuterol/ipratropium for Nebulization 3 milliLiter(s) Nebulizer every 6 hours  apixaban 2.5 milliGRAM(s) Oral every 12 hours  atorvastatin 10 milliGRAM(s) Oral at bedtime  finasteride 5 milliGRAM(s) Oral every 24 hours  lactated ringers. 500 milliLiter(s) IV Continuous <Continuous>  piperacillin/tazobactam IVPB.. 2.25 Gram(s) IV Intermittent every 8 hours  polyethylene glycol 3350 17 Gram(s) Oral daily  senna 2 Tablet(s) Oral at bedtime  sertraline 50 milliGRAM(s) Oral every 24 hours  tamsulosin 0.4 milliGRAM(s) Oral at bedtime      Hospital Medications:   MEDICATIONS  (STANDING):  albuterol/ipratropium for Nebulization 3 milliLiter(s) Nebulizer every 6 hours  apixaban 2.5 milliGRAM(s) Oral every 12 hours  atorvastatin 10 milliGRAM(s) Oral at bedtime  finasteride 5 milliGRAM(s) Oral every 24 hours  lactated ringers. 500 milliLiter(s) (50 mL/Hr) IV Continuous <Continuous>  piperacillin/tazobactam IVPB.. 2.25 Gram(s) IV Intermittent every 8 hours  polyethylene glycol 3350 17 Gram(s) Oral daily  senna 2 Tablet(s) Oral at bedtime  sertraline 50 milliGRAM(s) Oral every 24 hours  tamsulosin 0.4 milliGRAM(s) Oral at bedtime      SOCIAL HISTORY:  Denies ETOh, smoking, or drug use.     Family History:  FAMILY HISTORY:        VITALS:  T(F): 97.7 (08-19-22 @ 05:00), Max: 98 (08-18-22 @ 22:20)  HR: 93 (08-19-22 @ 05:00)  BP: 94/53 (08-19-22 @ 05:00)  RR: 18 (08-19-22 @ 05:00)  SpO2: 99% (08-19-22 @ 05:00)  Wt(kg): --    08-18 @ 07:01  -  08-19 @ 07:00  --------------------------------------------------------  IN: 1500 mL / OUT: 750 mL / NET: 750 mL        CAPILLARY BLOOD GLUCOSE          Review of Systems:  CONSTITUTIONAL: No fever or weight loss   RESPIRATORY: No shortness of breath, cough  CARDIOVASCULAR: No Chest pain, shortness of breath   GASTROINTESTINAL: No abdominal pain, nausea, vomiting, diarrhea  GENITOURINARY: No urinary frequency, gross hematuria, dysuria  NEUROLOGICAL: No headache, weakness  SKIN: No rash or skin lesion  MUSCULOSKELETAL: No swelling or pain   Psych: Denies suicidal or homicidal ideation     PHYSICAL EXAM:  GENERAL: laying in bed, NAD  HEENT: dry MM. neck supple, no JVP  CHEST/LUNG: speaking in full sentences, no accessory muscle use  HEART: Regular rate and rhythm, no murmur, no gallops, no rub   ABDOMEN: Soft, nontender, mildly distended  : No flank or supra pubic fullness but no tenderness.  EXTREMITIES: no pedal edema  Neurology: AAOx3      LABS:  08-19    134<L>  |  103  |  96<H>  ----------------------------<  106<H>  4.5   |  19<L>  |  4.01<H>    Ca    8.9      19 Aug 2022 05:30  Phos  3.8     08-19  Mg     2.2     08-19    TPro  4.7<L>  /  Alb  2.1<L>  /  TBili  0.5  /  DBili      /  AST  60<H>  /  ALT  25  /  AlkPhos  33<L>  08-19    Creatinine Trend: 4.01 <--, 4.41 <--, 4.13 <--, 4.27 <--, 4.26 <--                        7.8    7.62  )-----------( 251      ( 19 Aug 2022 05:30 )             23.5     Urine Studies:    Sodium, Random Urine: <20 mmol/L (08-19 @ 06:51)  Creatinine, Random Urine: 62 mg/dL (08-19 @ 06:51)  Protein/Creatinine Ratio Calculation: 0.6 Ratio (08-19 @ 06:51)  Potassium, Random Urine: 48 mmol/L (08-19 @ 06:51)

## 2022-08-19 NOTE — DISCHARGE NOTE PROVIDER - HOSPITAL COURSE
89 y/o male PMH TIA, restrictive CM, CHF, HTN, HLD, Afib (on Eliquis), CKD (baseline Scr 2.0), BPH, urinary retention, limited mobility, brought from [facility] for oconcern of vomiting, hypoxia, sob, hypotension likely due to aspiration pneumonia and SADIQ on CKD.     $Acute kidney injury superimposed on CKD.   Patient presented with SADIQ on admission, BUN/Scr 98/4.26 (baseline Scr 2), likely postrenal given patient's history of urinary retention vs prerenal in setting of poor po intake and hypovolemia since patient is on Lasix vs recent UTI (patient was being treated with Levaquin in [place] for reported UTI for 7 days starting 8.13 UCx from 8/3 with kleb pneumo. Patient found to be retaining with PVRs 350-400. Urology unable to place bedside Epstein due to false passage in prostate, recommended cystoscopy assisted Epstein.  - Presenting with metabolic acidosis likely secondary to elevated BUN   - FENa: 1.0% prerenal, Francine<20: patient dry   - urinary output:     Plan:  - c/w LR 500fluid resus cautiously in setting of LVEF <50%   - maintain on condom cath but still retaining  - bladder scan q6, straight cath if PVR>300  - trend Cr, avoid nephrotoxic drugs, renally dose meds  - F/u UA and Ucx  - f/u renal U/s   - renal following.     Problem/Plan - 2:  ·  Problem: Sepsis.   ·  Plan: Patient meeting two SIRS critera HR 93 WBC 13.88 on admission possible sources aspiration pneumonitis and UTI.  Patient has UCx positive for the kleb pnuemo on 8/3 and was being treated with Levaquin since 8/13.  Abdominal source unlikely patient without diarrhea, no evidence of infection on CTAP.  Patient remains afebrile, hemodynamically stable   - improving leukocytosis, procal 1.94    - c/w zosyn 2.25 q8 (8/18-8/25) adjusted for age   - Bcx NTD x 2 days  - F/u UA/ Ucx unable to straight cath   - c/w vest PT BID.     Problem/Plan - 3:  ·  Problem: Aspiration pneumonitis.   ·  Plan: Patient with recent cough, sob, hypoxia at [facility], CTAP with evidence of aspiration pneumonitis, likely in setting of vomiting.  Patient currently continues to cough but no longer hypoxic. No infiltrates or consolidations on imaging.     Plan:  - S&S cleared for minced and moist with thin liquids  - Maintained for aspiration precautions.     Problem/Plan - 4:  ·  Problem: Nausea & vomiting.   ·  Plan: RESOLVED. Patient with nausea and vomiting for two days, possibly in setting of uremia or UTI.  Other differential include viral gastroenteritis, no evidence of colitis or most severe GI infection on imaging and no reported diarrhea.  Cardiac etiology unlikely, patient without symptoms, no ischemia on EKG, trop likely up in setting of renal failure now peaked.  - CTH: no acute pathology     Plan:  - Continue Zofran PRN vomiting.     Problem/Plan - 5:  ·  Problem: Encephalopathy.   ·  Plan: RESOLVED. Patient A&Ox1 on admission, unknown baseline, unclear if there is underlying dementia of if this is an acute process.  Daughter (HCP) reports she is estranged from father and does not know his current mental status.          # Elevated troponin   PMH constrictive CM  Patient with troponin 0.22 on admission which trended down to 0.2, likely elevated in setting of SADIQ and CKD.   - troponin downtrending 0.2-> 0.18.     Problem/Plan - 6:  ·  Problem: Sacral wound.  ·  Plan: Stage II-III sacral wound     Plan:  - f/w wound care recs.       Problem/Plan - 7:  ·  Problem: BPH (benign prostatic hyperplasia).  ·  Plan: Patient with history of BPH complicated by urinary retention, home medications Finasteride 5mg qd, and Tamsulosin 0.4mg qd.   - Continue home medications.       Problem/Plan - 8:  ·  Problem: Anemia.  ·  Plan: Hgb on admission 9.4 (baseline 8) normocytic, likely GM vs AOCD in setting of renal failure. Currently no signs of active bleeding (no hematochezia, melena, hemoptysis, hematuria)  - obtain iron panel  - trend CBC  - maintain active T&S (8/19)  - transfuse if Hgb <7.       Problem/Plan - 9:  ·  Problem: Chronic CHF. 89 y/o male PMH TIA, restrictive CM, CHF, HTN, HLD, Afib (on Eliquis), CKD (baseline Scr 2.0), BPH, urinary retention, limited mobility, [facility], hypoxia, sob, hypotension likely due to aspiration pneumonia and SADIQ on CKD.     Acute kidney injury superimposed on CKD.   Patient presented with SADIQ on admission, BUN/Scr 98/4.26 (baseline Scr 2), likely prerenal in setting of poor po intake and hypovolemia vs postrenal urinary retention with recent UTI (patient was being treated with Levaquin in NH for reported UTI for 7 days starting 8.13 UCx from 8/3 with kleb pneumo. Patient initially found to be retaining with history of BPH continued on home medications Finasteride 5mg qd, and Tamsulosin 0.4mg qd.  Urology unable to place bedside Epstein due to false passage in prostate, recommended cystoscopy assisted Epstein. Pt adamantly refused. Pt encouraged to urinate with condom catheter with appropriate urine output. Developed metabolic acidosis likely secondary to elevated BUN with poor PO intake despite encouragement. Renal ultrasound found Increased renal parenchymal echogenicity bilaterally with several renal cysts. Patient cautiously repleted with LR matching urinary output with consideration of LVEF 50%.       Plan:  - c/w LR 500fluid resus cautiously in setting of LVEF <50%   - maintain on condom cath but still retaining  - bladder scan q6, straight cath if PVR>300  - trend Cr, avoid nephrotoxic drugs, renally dose meds  - F/u UA and Ucx  - f/u renal U/s   - renal following.    #Sepsis.   Now resolved. Patient meeting two SIRS critera HR 93 WBC 13.88 on admission likely secondary to aspiration pneumonitis, in setting of vomiting. CXR demonstrating.  S/p zosyn 2.25 q8 (8/18-8/25) adjusted for age. Bcx NTD, started chest physical therapy to release secretions. Patient demonstrated improving leukocytosis, procal 1.94. Bcx NTD       Problem/Plan - 3:  ·  Problem: Aspiration pneumonitis.   ·  Plan: [facility] with evidence of aspiration pneumonitis, likely in setting of vomiting.  Patient currently continues to cough but no longer hypoxic. No infiltrates or consolidations on imaging.     Plan:  - S&S cleared for minced and moist with thin liquids  - Maintained for aspiration precautions.    #Encephalopathy.   ·  Plan: RESOLVED. Patient A&Ox1 on admission, unknown baseline, unclear if there is underlying dementia of if this is an acute process.  Daughter (HCP) reports she is estranged from father and does not know his current mental status.      # Elevated troponin   PMH constrictive CM  Patient with troponin 0.22 on admission which trended down to 0.2, likely elevated in setting of SADIQ and CKD.   - troponin downtrending 0.2-> 0.18.    #Sacral wound.  Pressure sacral wound. Seen by wound care who placed pressure bandage and pressure relief.      #Anemia.   Hb on admission 9.4 (baseline 8) normocytic, likely GM vs AOCD in setting of renal failure. Currently no signs of active bleeding (no hematochezia, melena, hemoptysis, hematuria). Hb stable. Maintained two large bore IVs, active type and screen. Iron panel demonstrated     #Chronic CHF.   Patient with CHF, BNP on admission 2189. Home medications Coreg 3.125mg BID, Enalapril 5mg qd, Lasix 20mg qd, Hydralazine 50mg TID.  Patient clinically euvolemic on exam.  TTE: LVEF 45-50% with global hypokinesis. Abnormal septal motion seen due to a left bundle branch block. Mild MR, mild AR, trivial PCD effusion.       #Hypertension. Home medications Coreg 3.125mg BID, Enalapril 5mg qd, Lasix 20mg qd, Hydralazine 50mg TID.  Initially held medications since patient was hypotensive.    #Hyperlipidemia.   Continue home medication Lipitor 10mg qd.     #Chronic atrial fibrillation.   Continue home medication Eliquis 2.5 mg BID.        #Discharge: do not delete    89 y/o male PMH TIA, restrictive CM, CHF, HTN, HLD, Afib (on Eliquis), CKD (baseline Scr 2.0), BPH, urinary retention, limited mobility, brought from [facility] for oconcern of vomiting, hypoxia, sob, hypotension likely due to aspiration pneumonia and SADIQ on CKD.     Acute kidney injury superimposed on CKD.   Patient presented with SADIQ on admission, BUN/Scr 98/4.26 (baseline Scr 2), likely prerenal in setting of poor po intake and hypovolemia vs postrenal urinary retention with recent UTI (patient was being treated with Levaquin in NH for reported UTI for 7 days starting 8.13 UCx from 8/3 with kleb pneumo. Patient initially found to be retaining with history of BPH continued on home medications Finasteride 5mg qd, and Tamsulosin 0.4mg qd.  Urology unable to place bedside Epstein due to false passage in prostate, recommended cystoscopy assisted Epstein. Pt adamantly refused. Pt encouraged to urinate with condom catheter with appropriate urine output. Developed metabolic acidosis likely secondary to elevated BUN with poor PO intake despite encouragement. Renal ultrasound found Increased renal parenchymal echogenicity bilaterally with several renal cysts. Patient cautiously repleted with LR matching urinary output with consideration of LVEF 50%. s/p 150mEq NaHCO3 for metabolic acidosis. Anion gap closed. Encouraged PO intake and repleted with LR cautiously monitoring strict PVRs.     #Sepsis.   Now resolved. Patient meeting two SIRS critera HR 93 WBC 13.88 on admission likely secondary to aspiration pneumonitis, in setting of vomiting. CXR demonstrating.  S/p zosyn 2.25 q8 (8/18-8/25) adjusted for age. Bcx NTD, started chest physical therapy to release secretions. Patient demonstrated improving leukocytosis, procal 1.94. Bcx NTD      #Aspiration pneumonitis.   ·  Plan: Patient with recent cough, sob, hypoxia at [facility], CTAP with evidence of aspiration pneumonitis, likely in setting of vomiting.  Patient currently continues to cough but no longer hypoxic. No infiltrates or consolidations on imaging. Cleared for minced and moist with thin liquids, maintained aspiration precautions.    #Encephalopathy.   ·  Plan: RESOLVED. Patient A&Ox1 on admission, unknown baseline, unclear if there is underlying dementia of if this is an acute process.  Daughter (HCP) reports she is estranged from father and does not know his current mental status.      # Elevated troponin   PMH constrictive CM  Patient with troponin 0.22 on admission which trended down to 0.2, likely elevated in setting of SADIQ and CKD.   - troponin downtrending 0.2-> 0.18.    #Sacral wound.  Pressure sacral wound. Seen by wound care who placed pressure bandage and pressure relief.    #Anemia.   Hb on admission 9.4 (baseline 8) normocytic, likely GM vs AOCD in setting of renal failure. Currently no signs of active bleeding (no hematochezia, melena, hemoptysis, hematuria). Hb stable. Maintained two large bore IVs, active type and screen. Iron panel demonstrated     #Chronic CHF.   Patient with CHF, BNP on admission 2189. Home medications Coreg 3.125mg BID, Enalapril 5mg qd, Lasix 20mg qd, Hydralazine 50mg TID.  Patient clinically euvolemic on exam.  TTE: LVEF 45-50% with global hypokinesis. Abnormal septal motion seen due to a left bundle branch block. Mild MR, mild AR, trivial PCD effusion.       #Hypertension. Home medications Coreg 3.125mg BID, Enalapril 5mg qd, Lasix 20mg qd, Hydralazine 50mg TID.  Home medications held in setting of hypotension    #Hyperlipidemia.   Continue home medication Lipitor 10mg qd.     #Chronic atrial fibrillation.   Continue home medication Eliquis 2.5 mg BID.        Patient was discharged to: [facility]  New medications: none  Changes to old medications:   Medications that were stopped: none  Items to Follow up as outpatient   Physical exam at time of discharge: AAOx3, +S1, S2, no m/r/g, rhonchi appreciated in LLL, abdomen soft NT, ND +bowel sounds, trace b/l L/E edema        #Discharge: do not delete    89 y/o male PMH TIA, restrictive CM, CHF, HTN, HLD, Afib (on Eliquis), CKD (baseline Scr 2.0), BPH, urinary retention, limited mobility, brought from [facility] for oconcern of vomiting, hypoxia, sob, hypotension likely due to aspiration pneumonia and SADIQ on CKD.     Acute kidney injury superimposed on CKD.   Patient presented with SADIQ on admission, BUN/Scr 98/4.26 (baseline Scr 2), likely prerenal in setting of poor po intake and hypovolemia vs postrenal urinary retention with recent UTI (patient was being treated with Levaquin in NH for reported UTI for 7 days starting 8.13 UCx from 8/3 with kleb pneumo. Patient initially found to be retaining with history of BPH while on home medications Finasteride 5mg qd, and Tamsulosin 0.4mg qd.  Urology unable to place bedside Epstein due to false passage in prostate, recommended cystoscopy assisted Epstein. Pt encouraged to urinate with condom catheter with appropriate urine output. Developed metabolic acidosis likely secondary to elevated BUN with poor PO intake despite encouragement. Renal ultrasound found increased renal parenchymal echogenicity bilaterally with several renal cysts. Patient cautiously repleted with LR matching urinary output with consideration of LVEF 50%. s/p 150mEq NaHCO3 for metabolic acidosis. Anion gap closed. Encouraged PO intake and repleted with LR cautiously monitoring strict PVRs with improvement of Cr.  Likely secondary to ATN 2/2 poor PO intake and sepsis   Cr upon discharge: 3.73    Plan:  - f/u with Dr Goldstein in two weeks for repeat Cr     #Sepsis.   Now resolved. Patient meeting two SIRS critera HR 93 WBC 13.88 on admission likely secondary to aspiration pneumonitis, in setting of vomiting. CXR demonstrating.  S/p zosyn 2.25 q8 (8/18-8/25) adjusted for age. Bcx NTD, started chest physical therapy to release secretions. Patient demonstrated improving leukocytosis, procal 1.94. Bcx NTD      #Aspiration pneumonitis.   ·  Plan: Patient with recent cough, sob, hypoxia at [facility], CTAP with evidence of aspiration pneumonitis, likely in setting of vomiting.  Patient currently continues to cough but no longer hypoxic. No infiltrates or consolidations on imaging. Cleared for minced and moist with thin liquids, maintained aspiration precautions.    #Encephalopathy.   ·  Plan: RESOLVED. Patient A&Ox1 on admission, unknown baseline, unclear if there is underlying dementia of if this is an acute process.  Daughter (HCP) reports she is estranged from father and does not know his current mental status.      # Elevated troponin   PMH constrictive CM  Patient with troponin 0.22 on admission which trended down to 0.2, likely elevated in setting of SADIQ and CKD.   - troponin downtrending 0.2-> 0.18.    #Sacral wound.  Pressure sacral wound. Seen by wound care who placed pressure bandage and pressure relief.    #Anemia.   Hb on admission 9.4 (baseline 8) normocytic, likely GM vs AOCD in setting of renal failure. Currently no signs of active bleeding (no hematochezia, melena, hemoptysis, hematuria). Hb stable. Maintained two large bore IVs, active type and screen. Iron panel demonstrated     #Chronic CHF.   Patient with CHF, BNP on admission 2189. Home medications Coreg 3.125mg BID, Enalapril 5mg qd, Lasix 20mg qd, Hydralazine 50mg TID.  Patient clinically euvolemic on exam.  TTE: LVEF 45-50% with global hypokinesis. Abnormal septal motion seen due to a left bundle branch block. Mild MR, mild AR, trivial PCD effusion.       #Hypertension. Home medications Coreg 3.125mg BID, Enalapril 5mg qd, Lasix 20mg qd, Hydralazine 50mg TID.  Home medications held in setting of hypotension    #Hyperlipidemia.   Continue home medication Lipitor 10mg qd.     #Chronic atrial fibrillation.   Continue home medication Eliquis 2.5 mg BID.        Patient was discharged to: [facility]  New medications: none  Changes to old medications: none  Medications that were stopped: none  Items to Follow up as outpatient: Cr with Dr Goldstein  Physical exam at time of discharge: AAOx3, +S1, S2, no m/r/g, rhonchi appreciated in LLL, abdomen soft NT, ND +bowel sounds, trace b/l L/E edema        #Discharge: do not delete    89 y/o male PMH TIA, restrictive CM, CHF, HTN, HLD, Afib (on Eliquis), CKD (baseline Scr 2.0), BPH, urinary retention, limited mobility, brought from [facility] for oconcern of vomiting, hypoxia, sob, hypotension likely due to aspiration pneumonia and SADIQ on CKD.     Acute kidney injury superimposed on CKD.   Patient presented with SADIQ on admission, BUN/Scr 98/4.26 (baseline Scr 2), likely ATN (setting of hypovolemia with poor PO intake and sepsis) and prerenal in setting of poor po intake with postrenal urinary retention with recent UTI Klebsiella on Ucx (patient was being treated with Levaquin in NH for reported UTI for 7 days starting 8.13. Patient initially found to be retaining with history of BPH while on home medications Finasteride 5mg qd, and Tamsulosin 0.4mg qd.  Urology unable to place bedside Epstein due to false passage in prostate, recommended cystoscopy assisted Epstein, which patient adamantly refused. Pt encouraged to urinate with condom catheter with appropriate urine output. Developed metabolic acidosis likely secondary to elevated BUN with poor PO intake despite encouragement. Renal ultrasound found increased renal parenchymal echogenicity bilaterally with several renal cysts. Patient cautiously repleted with LR matching urinary output with consideration of LVEF 50%. s/p 150mEq NaHCO3 for metabolic acidosis. Anion gap closed. Encouraged PO intake and repleted with LR cautiously monitoring strict PVRs with improvement of Cr. Cr upon discharge: 3.73    Plan:  - f/u with Dr Goldstein in two weeks for repeat Cr     #Sepsis.   Now resolved. Patient meeting two SIRS critera HR 93 WBC 13.88 on admission likely secondary to aspiration pneumonitis, in setting of vomiting. CXR demonstrating.  S/p zosyn 2.25 q8 (8/18-8/25) adjusted for age. Bcx NTD, started chest physical therapy to release secretions. Patient demonstrated improving leukocytosis, procal 1.94. Bcx NTD      #Aspiration pneumonitis.   ·  Plan: Patient with recent cough, sob, hypoxia at [facility], CTAP with evidence of aspiration pneumonitis, likely in setting of vomiting.  Patient currently continues to cough but no longer hypoxic. No infiltrates or consolidations on imaging. Cleared for minced and moist with thin liquids, maintained aspiration precautions.    #Encephalopathy.   ·  Plan: RESOLVED. Patient A&Ox1 on admission, unknown baseline, unclear if there is underlying dementia of if this is an acute process.  Daughter (HCP) reports she is estranged from father and does not know his current mental status.      # Elevated troponin   PMH constrictive CM  Patient with troponin 0.22 on admission which trended down to 0.2, likely elevated in setting of SADIQ and CKD.   - troponin downtrending 0.2-> 0.18.    #Sacral wound.  Pressure sacral wound. Seen by wound care who placed pressure bandage and pressure relief.    #Anemia.   Hb on admission 9.4 (baseline 8) normocytic, likely GM vs AOCD in setting of renal failure. Currently no signs of active bleeding (no hematochezia, melena, hemoptysis, hematuria). Hb stable. Maintained two large bore IVs, active type and screen. Iron panel demonstrated     #Chronic CHF.   Patient with CHF, BNP on admission 2189. Home medications Coreg 3.125mg BID, Enalapril 5mg qd, Lasix 20mg qd, Hydralazine 50mg TID.  Patient clinically euvolemic on exam.  TTE: LVEF 45-50% with global hypokinesis. Abnormal septal motion seen due to a left bundle branch block. Mild MR, mild AR, trivial PCD effusion.     - hold home lasix,       #Hypertension. Home medications Coreg 3.125mg BID, Enalapril 5mg qd, Lasix 20mg qd, Hydralazine 50mg TID.  Home medications held in setting of hypotension    Plan:  - continue to hold coreg, enalapril, lasix and hydralazine in setting of low blood pressures  - schedule f/u with Dr Jefferson in 1 week to f/u BP     #Hyperlipidemia.   Continue home medication Lipitor 10mg qd.     #Chronic atrial fibrillation.   Continue home medication Eliquis 2.5 mg BID.        Patient was discharged to: [facility]  New medications: none  Changes to old medications: none  Medications that were stopped: HOLD Coreg 3.125 BID, enalapril 5 mg qd, lasix 20mg qd, hydralazine 50mg TID in setting of low BP   Items to Follow up as outpatient: Cr with Dr Goldstein, Blood pressure   Physical exam at time of discharge: AAOx3, +S1, S2, no m/r/g, rhonchi appreciated in LLL, abdomen soft NT, ND +bowel sounds, trace b/l L/E edema        #Discharge: do not delete    89 y/o male PMH TIA, restrictive CM, CHF, HTN, HLD, Afib (on Eliquis), CKD (baseline Scr 2.0), BPH, urinary retention, limited mobility, brought from [facility] for oconcern of vomiting, hypoxia, sob, hypotension likely due to aspiration pneumonia and SADIQ on CKD.     Acute kidney injury superimposed on CKD.   Patient presented with SADIQ on admission, BUN/Scr 98/4.26 (baseline Scr 2), likely ATN (setting of hypovolemia with poor PO intake and sepsis) and prerenal in setting of poor po intake with postrenal urinary retention with recent UTI Klebsiella on Ucx (patient was being treated with Levaquin in NH for reported UTI for 7 days starting 8.13. Patient initially found to be retaining with history of BPH while on home medications Finasteride 5mg qd, and Tamsulosin 0.4mg qd.  Urology unable to place bedside Epstein due to false passage in prostate, recommended cystoscopy assisted Epstein, which patient adamantly refused. Pt encouraged to urinate with condom catheter with appropriate urine output. Developed metabolic acidosis likely secondary to elevated BUN with poor PO intake despite encouragement. Renal ultrasound found increased renal parenchymal echogenicity bilaterally with several renal cysts. Patient cautiously repleted with LR matching urinary output with consideration of LVEF 50%. s/p 150mEq NaHCO3 for metabolic acidosis. Anion gap closed. Encouraged PO intake and repleted with LR cautiously monitoring strict PVRs with improvement of Cr, good urinary output. Cr upon discharge: 3.73    Plan:  - f/u with Dr Goldstein in two weeks for repeat Cr   - hold home lasix, lisinopril in setting of ATN    #Sepsis.   Now resolved. Patient meeting two SIRS critera HR 93 WBC 13.88 on admission likely secondary to aspiration pneumonitis, in setting of vomiting. CXR demonstrating.  S/p zosyn 2.25 q8 (8/18-8/25) adjusted for age. Bcx NTD, started chest physical therapy to release secretions. Patient demonstrated improving leukocytosis, procal 1.94. Bcx NTD      #Aspiration pneumonitis.   ·  Plan: Patient with recent cough, sob, hypoxia at [facility], CTAP with evidence of aspiration pneumonitis, likely in setting of vomiting.  Patient currently continues to cough but no longer hypoxic. No infiltrates or consolidations on imaging. Cleared for minced and moist with thin liquids, maintained aspiration precautions.    #Encephalopathy.   ·  Plan: RESOLVED. Patient A&Ox1 on admission, unknown baseline, unclear if there is underlying dementia of if this is an acute process.  Daughter (HCP) reports she is estranged from father and does not know his current mental status.      # Elevated troponin   PMH constrictive CM  Patient with troponin 0.22 on admission which trended down to 0.2, likely elevated in setting of SADIQ and CKD.   - troponin downtrending 0.2-> 0.18.    #Wounds  Pressure sacral wound. Seen by wound care who placed pressure bandage and pressure relief.    Plan:  continue with pressure dressing on R heel and saccrum    #Anemia.   Hb on admission 9.4 (baseline 8) normocytic, likely GM vs AOCD in setting of renal failure. Currently no signs of active bleeding (no hematochezia, melena, hemoptysis, hematuria). Hb stable. Maintained two large bore IVs, active type and screen. Iron panel demonstrated     #Chronic CHF.   Patient with CHF, BNP on admission 2189. Home medications Coreg 3.125mg BID, Enalapril 5mg qd, Lasix 20mg qd, Hydralazine 50mg TID.  Patient clinically euvolemic on exam.  TTE: LVEF 45-50% with global hypokinesis. Abnormal septal motion seen due to a left bundle branch block. Mild MR, mild AR, trivial PCD effusion.     - hold home lasix, coreg, enalapril and hydralazine   - schedule f/u with Dr Jefferson in 1 week to f/u BP     #Hypertension. Home medications Coreg 3.125mg BID, Enalapril 5mg qd, Lasix 20mg qd, Hydralazine 50mg TID.  Home medications held in setting of hypotension    Plan:  - continue to hold coreg, enalapril, lasix and hydralazine in setting of low blood pressures      #Hyperlipidemia.   Continue home medication Lipitor 10mg qd.     #Chronic atrial fibrillation.   Continue home medication Eliquis 2.5 mg BID.        Patient was discharged to: [facility]  New medications: none  Changes to old medications: none  Medications that were stopped: HOLD enalapril 5 mg qd, lasix 20mg qd in setting of ATN, hold home Coreg 3.125 BID and hydralazine 50mg TID in setting of low BP   Items to Follow up as outpatient:     BP, Cr  with Dr Jefferson in 1 week   Cr with Dr Goldstein in 1-2 weeks     Physical exam at time of discharge: AAOx3, +S1, S2, no m/r/g, rhonchi appreciated in LLL, abdomen soft NT, ND +bowel sounds, trace b/l L/E edema        #Discharge: do not delete    89 y/o male PMH TIA, restrictive CM, CHF, HTN, HLD, Afib (on Eliquis), CKD (baseline Scr 2.0), BPH, urinary retention, limited mobility, brought from [facility] for oconcern of vomiting, hypoxia, sob, hypotension likely due to aspiration pneumonia and SADIQ on CKD.     Acute kidney injury superimposed on CKD.   Patient presented with SADIQ on admission, BUN/Scr 98/4.26 (baseline Scr 2), likely ATN (setting of hypovolemia with poor PO intake and sepsis) and prerenal in setting of poor po intake with postrenal urinary retention with recent UTI Klebsiella on Ucx (patient was being [place] with Lev[place]quin in [place] for reported UTI for 7 days starting 8.13. Patient initially found to be retaining with history of BPH while on home medications Finasteride 5mg qd, and Tamsulosin 0.4mg qd.  Urology unable to place bedside Epstein due to false passage in prostate, recommended cystoscopy assisted Epstein, which patient adamantly refused. Pt encouraged to urinate with condom catheter with appropriate urine output. Developed metabolic acidosis likely secondary to elevated BUN with poor PO intake despite encouragement. Renal ultrasound found increased renal parenchymal echogenicity bilaterally with several renal cysts. Patient cautiously repleted with LR matching urinary output with consideration of LVEF 50%. s/p 150mEq NaHCO3 for metabolic acidosis. Anion gap closed. Encouraged PO intake and repleted with LR cautiously monitoring strict PVRs with improvement of Cr, good urinary output. Cr upon discharge: 3.73    Plan:  - f/u with Dr Goldstein in two weeks for repeat Cr   - hold home lasix, lisinopril in setting of ATN    #Sepsis.   Now resolved. Patient meeting two SIRS critera HR 93 WBC 13.88 on admission likely secondary to aspiration pneumonitis, in setting of vomiting. CXR demonstrating.  S/p zosyn 2.25 q8 (8/18-8/25) adjusted for age. Bcx NTD, started chest physical therapy to release secretions. Patient demonstrated improving leukocytosis, procal 1.94. Bcx NTD      #Aspiration pneumonitis.   ·  Plan: Patient with recent cough, sob, hypoxia at [facility], CTAP with evidence of aspiration pneumonitis, likely in setting of vomiting.  Patient currently continues to cough but no longer hypoxic. No infiltrates or consolidations on imaging. Cleared for minced and moist with thin liquids, maintained aspiration precautions.    c/w 2.25 zosyn IV until 8/25    #Encephalopathy.   ·  Plan: RESOLVED. Patient A&Ox1 on admission, unknown baseline, unclear if there is underlying dementia of if this is an acute process.  Daughter (HCP) reports she is estranged from father and does not know his current mental status.      # Elevated troponin   PMH constrictive CM  Patient with troponin 0.22 on admission which trended down to 0.2, likely elevated in setting of SADIQ and CKD.   - troponin downtrending 0.2-> 0.18.    #Wounds  Pressure sacral wound. Seen by wound care who placed pressure bandage and pressure relief.    Plan:  continue with pressure dressing on R heel and saccrum    #Anemia.   Hb on admission 9.4 (baseline 8) normocytic, likely GM vs AOCD in setting of renal failure. Currently no signs of active bleeding (no hematochezia, melena, hemoptysis, hematuria). Hb stable. Maintained two large bore IVs, active type and screen. Iron panel demonstrated     #Chronic CHF.   Patient with CHF, BNP on admission 2189. Home medications Coreg 3.125mg BID, Enalapril 5mg qd, Lasix 20mg qd, Hydralazine 50mg TID.  Patient clinically euvolemic on exam.  TTE: LVEF 45-50% with global hypokinesis. Abnormal septal motion seen due to a left bundle branch block. Mild MR, mild AR, trivial PCD effusion.     - hold home lasix, coreg, enalapril and hydralazine   - schedule f/u with Dr Jefferson in 1 week to f/u BP     #Hypertension. Home medications Coreg 3.125mg BID, Enalapril 5mg qd, Lasix 20mg qd, Hydralazine 50mg TID.  Home medications held in setting of hypotension    Plan:  - continue to hold coreg, enalapril, lasix and hydralazine in setting of low blood pressures      #Hyperlipidemia.   Continue home medication Lipitor 10mg qd.     #Chronic atrial fibrillation.   Continue home medication Eliquis 2.5 mg BID.        Patient was discharged to: [facility]  New medications: none  Changes to old medications: none  Medications that were stopped: HOLD enalapril 5 mg qd, lasix 20mg qd in setting of ATN, hold home Coreg 3.125 BID and hydralazine 50mg TID in setting of low BP   Items to Follow up as outpatient:     BP, Cr  with Dr Jefferson in 1 week   Cr with Dr Goldstein in 1-2 weeks     Physical exam at time of discharge: AAOx3, +S1, S2, no m/r/g, rhonchi appreciated in LLL, abdomen soft NT, ND +bowel sounds, trace b/l L/E edema        #Discharge: do not delete    89 y/o male PMH TIA, restrictive CM, CHF, HTN, HLD, Afib (on Eliquis), CKD (baseline Scr 2.0), BPH, urinary retention, limited mobility, brought from [facility] for oconcern of vomiting, hypoxia, sob, hypotension likely due to aspiration pneumonia and SADIQ on CKD.     Daughter Emerita: [phone]    Acute kidney injury superimposed on CKD.   Patient presented with SADIQ on admission, BUN/Scr 98/4.26 (baseline Scr 2), likely ATN (setting of hypovolemia with poor PO intake and sepsis) and prerenal in setting of poor po intake with postrenal urinary retention with recent UTI Klebsiella on Ucx (patient was being treated with Levaquin in [place] for reported UTI for 7 days starting 8.13. Patient initially found to be retaining with history of BPH while on home medications Finasteride 5mg qd, and Tamsulosin 0.4mg qd.  Urology unable to place bedside Epstein due to false passage in prostate, recommended cystoscopy assisted Epstein, which patient adamantly refused. Pt encouraged to urinate with condom catheter with appropriate urine output. Developed metabolic acidosis likely secondary to elevated BUN with poor PO intake despite encouragement. Renal ultrasound found increased renal parenchymal echogenicity bilaterally with several renal cysts. Patient cautiously repleted with LR matching urinary output with consideration of LVEF 50%. s/p 150mEq NaHCO3 for metabolic acidosis. Anion gap closed. Encouraged PO intake and repleted with LR cautiously monitoring strict PVRs with improvement of Cr, good urinary output. Cr upon discharge: 3.73    Plan:  - f/u with Dr Goldstein in two weeks for repeat Cr   - hold home lasix, lisinopril in setting of ATN    #Sepsis.   Now resolved. Patient meeting two SIRS critera HR 93 WBC 13.88 on admission likely secondary to aspiration pneumonitis, in setting of vomiting. CXR demonstrating.  S/p zosyn 2.25 q8 (8/18-8/25) adjusted for age. Bcx NTD, started chest physical therapy to release secretions. Patient demonstrated improving leukocytosis, procal 1.94. Bcx NTD      #Aspiration pneumonitis.   ·  Plan: Patient with recent cough, sob, hypoxia at [facility], CTAP with evidence of aspiration pneumonitis, likely in setting of vomiting.  Patient currently continues to cough but no longer hypoxic. No infiltrates or consolidations on imaging. Cleared for minced and moist with thin liquids, maintained aspiration precautions.    c/w 2.25 zosyn IV until 8/25    #Encephalopathy.   ·  Plan: RESOLVED. Patient A&Ox1 on admission, unknown baseline, unclear if there is underlying dementia of if this is an acute process.  Daughter (HCP) reports she is estranged from father and does not know his current mental status.      # Elevated troponin   PMH constrictive CM  Patient with troponin 0.22 on admission which trended down to 0.2, likely elevated in setting of SADIQ and CKD.   - troponin downtrending 0.2-> 0.18.    #Wounds  Pressure sacral wound. Seen by wound care who placed pressure bandage and pressure relief.    Plan:  continue with pressure dressing on R heel and saccrum    #Anemia.   Hb on admission 9.4 (baseline 8) normocytic, likely GM vs AOCD in setting of renal failure. Currently no signs of active bleeding (no hematochezia, melena, hemoptysis, hematuria). Hb stable. Maintained two large bore IVs, active type and screen. Iron panel demonstrated     #Chronic CHF.   Patient with CHF, BNP on admission 2189. Home medications Coreg 3.125mg BID, Enalapril 5mg qd, Lasix 20mg qd, Hydralazine 50mg TID.  Patient clinically euvolemic on exam.  TTE: LVEF 45-50% with global hypokinesis. Abnormal septal motion seen due to a left bundle branch block. Mild MR, mild AR, trivial PCD effusion.     - hold home lasix, coreg, enalapril and hydralazine   - schedule f/u with Dr Jefferson in 1 week to f/u BP     #Hypertension. Home medications Coreg 3.125mg BID, Enalapril 5mg qd, Lasix 20mg qd, Hydralazine 50mg TID.  Home medications held in setting of hypotension    Plan:  - continue to hold coreg, enalapril, lasix and hydralazine in setting of low blood pressures      #Hyperlipidemia.   Continue home medication Lipitor 10mg qd.     #Chronic atrial fibrillation.   Continue home medication Eliquis 2.5 mg BID.        Patient was discharged to: [facility]  New medications: none  Changes to old medications: none  Medications that were stopped: HOLD enalapril 5 mg qd, lasix 20mg qd in setting of ATN, hold home Coreg 3.125 BID and hydralazine 50mg TID in setting of low BP   Items to Follow up as outpatient:     BP, Cr  with Dr Jefferson in 1 week   Cr with Dr Goldstein in 1-2 weeks     Physical exam at time of discharge: AAOx3, +S1, S2, no m/r/g, rhonchi appreciated in LLL, abdomen soft NT, ND +bowel sounds, trace b/l L/E edema        #Discharge: do not delete    89 y/o male PMH TIA, restrictive CM, CHF, HTN, HLD, Afib (on Eliquis), CKD (baseline Scr 2.0), BPH, urinary retention, limited mobility, brought from [facility] for oconcern of vomiting, hypoxia, sob, hypotension likely due to aspiration pneumonia and SADIQ on CKD.     Daughter Emerita: [phone]    Acute kidney injury superimposed on CKD.   Patient presented with SADIQ on admission, BUN/Scr 98/4.26 (baseline Scr 2), likely ATN (setting of hypovolemia with poor PO intake and sepsis) and prerenal in setting of poor po intake with postrenal urinary retention with recent UTI Klebsiella on Ucx (patient was being treated with [place]quin in [place] for reported UTI for 7 days starting 8.13. Patient initially found to be retaining with history of BPH while on home medications Finasteride 5mg qd, and Tamsulosin 0.4mg qd.  Urology unable to place bedside Epstein due to false passage in prostate, recommended cystoscopy assisted Epstein, which patient adamantly refused. Pt encouraged to urinate with condom catheter with appropriate urine output. Developed metabolic acidosis likely secondary to elevated BUN with poor PO intake despite encouragement. Renal ultrasound found increased renal parenchymal echogenicity bilaterally with several renal cysts. Patient cautiously repleted with LR matching urinary output with consideration of LVEF 50%. s/p 150mEq NaHCO3 for metabolic acidosis. Anion gap closed. Encouraged PO intake and repleted with LR cautiously monitoring strict PVRs with improvement of Cr, good urinary output. Cr upon discharge: 3.73    Plan:  - f/u with Dr Goldstein in two weeks for repeat Cr   - f/u with urology Dr Phipps 9/6 3:40PM  - hold home lasix, lisinopril in setting of ATN    #Sepsis.   Now resolved. Patient meeting two SIRS critera HR 93 WBC 13.88 on admission likely secondary to aspiration pneumonitis, in setting of vomiting. CXR demonstrating.  S/p zosyn 2.25 q8 (8/18-8/25) adjusted for age. Bcx NTD, started chest physical therapy to release secretions. Patient demonstrated improving leukocytosis, procal 1.94. Bcx NTD      #Aspiration pneumonitis.   ·  Plan: Patient with recent cough, sob, hypoxia at [facility], CTAP with evidence of aspiration pneumonitis, likely in setting of vomiting.  Patient currently continues to cough but no longer hypoxic. No infiltrates or consolidations on imaging. Cleared for minced and moist with thin liquids, maintained aspiration precautions.    c/w 2.25 zosyn IV until 8/25    #Encephalopathy.   ·  Plan: RESOLVED. Patient A&Ox1 on admission, unknown baseline, unclear if there is underlying dementia of if this is an acute process.  Daughter (HCP) reports she is estranged from father and does not know his current mental status.      # Elevated troponin   PMH constrictive CM  Patient with troponin 0.22 on admission which trended down to 0.2, likely elevated in setting of SADIQ and CKD.   - troponin downtrending 0.2-> 0.18.    #Wounds  Pressure sacral wound. Seen by wound care who placed pressure bandage and pressure relief.    Plan:  continue with pressure dressing on R heel and saccrum    #Anemia.   Hb on admission 9.4 (baseline 8) normocytic, likely GM vs AOCD in setting of renal failure. Currently no signs of active bleeding (no hematochezia, melena, hemoptysis, hematuria). Hb stable. Maintained two large bore IVs, active type and screen. Iron panel demonstrated     #Chronic CHF.   Patient with CHF, BNP on admission 2189. Home medications Coreg 3.125mg BID, Enalapril 5mg qd, Lasix 20mg qd, Hydralazine 50mg TID.  Patient clinically euvolemic on exam.  TTE: LVEF 45-50% with global hypokinesis. Abnormal septal motion seen due to a left bundle branch block. Mild MR, mild AR, trivial PCD effusion.     - hold home lasix, coreg, enalapril and hydralazine   - schedule f/u with Dr Jefferson in 1 week to f/u BP     #Hypertension. Home medications Coreg 3.125mg BID, Enalapril 5mg qd, Lasix 20mg qd, Hydralazine 50mg TID.  Home medications held in setting of hypotension    Plan:  - continue to hold coreg, enalapril, lasix and hydralazine in setting of low blood pressures      #Hyperlipidemia.   Continue home medication Lipitor 10mg qd.     #Chronic atrial fibrillation.   Continue home medication Eliquis 2.5 mg BID.        Patient was discharged to: [facility]  New medications: none  Changes to old medications: none  Medications that were stopped: HOLD enalapril 5 mg qd, lasix 20mg qd in setting of ATN, hold home Coreg 3.125 BID and hydralazine 50mg TID in setting of low BP   Items to Follow up as outpatient:     - BP, Cr  with Dr Jefferson in 1 week   - Cr with Dr Goldstein in 1-2 weeks   - BPH followup with Dr Phipps     Physical exam at time of discharge: AAOx3, +S1, S2, no m/r/g, rhonchi appreciated in LLL, abdomen soft NT, ND +bowel sounds, trace b/l L/E edema        89 y/o male PMH TIA, restrictive CM, CHF, HTN, HLD, Afib (on Eliquis), CKD (baseline Scr 2.0), BPH, urinary retention, limited mobility, brought from [facility] for oconcern of vomiting, hypoxia, sob, hypotension likely due to aspiration pneumonia and SAIDQ on CKD.     Daughter Emerita: [phone]    Acute kidney injury superimposed on CKD.   Patient presented with SADIQ on admission, BUN/Scr 98/4.26 (baseline Scr 2), likely ATN (setting of hypovolemia with poor PO intake and sepsis) and prerenal in setting of poor po intake with postrenal urinary retention with recent UTI Klebsiella on Ucx (patient was being treated with Levaquin in [place] for reported UTI for 7 days starting 8.13. Patient initially found to be retaining with history of BPH while on home medications Finasteride 5mg qd, and Tamsulosin 0.4mg qd.  Urology unable to place bedside Epstein due to false passage in prostate, recommended cystoscopy assisted Epstein, which patient adamantly refused. Pt encouraged to urinate with condom catheter with appropriate urine output. Developed metabolic acidosis likely secondary to elevated BUN with poor PO intake despite encouragement. Renal ultrasound found increased renal parenchymal echogenicity bilaterally with several renal cysts. Patient cautiously repleted with LR matching urinary output with consideration of LVEF 50%. s/p 150mEq NaHCO3 for metabolic acidosis. Anion gap closed. Encouraged PO intake and repleted with LR cautiously monitoring strict PVRs with improvement of Cr, good urinary output. Cr upon discharge: 3.73    Plan:  - f/u with Dr Goldstein in two weeks for repeat Cr   - f/u with urology Dr Phipps 9/6 3:40PM  - hold home lasix, lisinopril in setting of ATN    #Sepsis.   Now resolved. Patient meeting two SIRS critera HR 93 WBC 13.88 on admission likely secondary to aspiration pneumonitis, in setting of vomiting. CXR demonstrating.  S/p zosyn 2.25 q8 (8/18-8/25) adjusted for age. Bcx NTD, started chest physical therapy to release secretions. Patient demonstrated improving leukocytosis, procal 1.94. Bcx NTD      #Aspiration pneumonitis.   ·  Plan: Patient with recent cough, sob, hypoxia at [facility], CTAP with evidence of aspiration pneumonitis, likely in setting of vomiting.  Patient currently continues to cough but no longer hypoxic. No infiltrates or consolidations on imaging. Cleared for minced and moist with thin liquids, maintained aspiration precautions.    c/w 2.25 zosyn IV until 8/25    #Encephalopathy.   ·  Plan: RESOLVED. Patient A&Ox1 on admission, unknown baseline, unclear if there is underlying dementia of if this is an acute process.  Daughter (HCP) reports she is estranged from father and does not know his current mental status.      # Elevated troponin   PMH constrictive CM  Patient with troponin 0.22 on admission which trended down to 0.2, likely elevated in setting of SADIQ and CKD.   - troponin downtrended 0.2-> 0.18.    #Wounds  Pressure sacral wound. Seen by wound care who placed pressure bandage and pressure relief.    Plan:  continue with pressure dressing on R heel and saccrum    #Anemia.   Hb on admission 9.4 (baseline 8) normocytic, likely GM vs AOCD in setting of renal failure. Currently no signs of active bleeding (no hematochezia, melena, hemoptysis, hematuria). Hb stable. Maintained two large bore IVs, active type and screen. Iron panel demonstrated     #Chronic CHF.   Patient with CHF, BNP on admission 2189. Home medications Coreg 3.125mg BID, Enalapril 5mg qd, Lasix 20mg qd, Hydralazine 50mg TID.  Patient clinically euvolemic on exam.  TTE: LVEF 45-50% with global hypokinesis. Abnormal septal motion seen due to a left bundle branch block. Mild MR, mild AR, trivial PCD effusion.     - hold home lasix, coreg, enalapril and hydralazine   - schedule f/u with Dr Jefferson in 1 week to f/u BP     #Hypertension. Home medications Coreg 3.125mg BID, Enalapril 5mg qd, Lasix 20mg qd, Hydralazine 50mg TID.  Home medications held in setting of hypotension    Plan:  - continue to hold coreg, enalapril, lasix and hydralazine in setting of low blood pressures      #Hyperlipidemia.   Continue home medication Lipitor 10mg qd.     #Chronic atrial fibrillation.   Continue home medication Eliquis 2.5 mg BID.        Patient was discharged to: [facility]  New medications: none  Changes to old medications: none  Medications that were stopped: HOLD enalapril 5 mg qd, lasix 20mg qd in setting of ATN, hold home Coreg 3.125 BID and hydralazine 50mg TID in setting of low BP   Items to Follow up as outpatient:     - BP, Cr  with Dr Jefferson in 1 week   - Cr with Dr Goldstein in 1-2 weeks   - BPH followup with Dr Phipps     Physical exam at time of discharge:   General: AAOx2, NAD  Head: NC/AT; MMM; PERRL; EOMI;  Neck: Supple; no JVD  Respiratory: CTAB; no wheezes/rales/rhonchi  Cardiovascular: Regular rhythm/rate; S1/S2+, no murmurs, rubs gallops   Gastrointestinal: Soft; NT; bowel sounds normal and present  Extremities: B/L UE swelling more focused on the L side, intact peripheral pulses  Neurological: Strength and sensation intact, no obvious focal deficits  Skin: Clean and intact. Poor skin turgor. Saccral wound and R heel ulcer with pressure dressings

## 2022-08-19 NOTE — PROGRESS NOTE ADULT - ASSESSMENT
89 y/o male PMH TIA, restrictive CM, CHF, HTN, HLD, Afib (on Eliquis), CKD (baseline Scr 2.0), BPH, urinary retention, limited mobility, brought from [facility] for oconcern of vomiting, hypoxia, sob, hypotension likely due to aspiration pneumonia and SADIQ on CKD.

## 2022-08-19 NOTE — DISCHARGE NOTE PROVIDER - NSDCCPCAREPLAN_GEN_ALL_CORE_FT
PRINCIPAL DISCHARGE DIAGNOSIS  Diagnosis: Pneumonia, aspiration  Assessment and Plan of Treatment:       SECONDARY DISCHARGE DIAGNOSES  Diagnosis: Acute kidney injury superimposed on CKD  Assessment and Plan of Treatment:      PRINCIPAL DISCHARGE DIAGNOSIS  Diagnosis: Pneumonia, aspiration  Assessment and Plan of Treatment: You were diagnosed with pneumonia, an infection in your lungs. You were treated with antibiotics and began to show improvement. Please continue taking your antibiotic *** until ***. Please follow up with your primary care physician to check for full resolution of this problem.        SECONDARY DISCHARGE DIAGNOSES  Diagnosis: Acute kidney injury superimposed on CKD  Assessment and Plan of Treatment:      PRINCIPAL DISCHARGE DIAGNOSIS  Diagnosis: Pneumonia, aspiration  Assessment and Plan of Treatment: You were diagnosed with pneumonia, an infection in your lungs. You were treated with antibiotics and began to show improvement. Please continue taking your antibiotic *** until ***. Please follow up with your primary care physician to check for full resolution of this problem.        SECONDARY DISCHARGE DIAGNOSES  Diagnosis: Acute kidney injury superimposed on CKD  Assessment and Plan of Treatment: You had a kidney injury secondary to your infection and poor oral intake. You were given fluids to help your kidney function and. Yo     PRINCIPAL DISCHARGE DIAGNOSIS  Diagnosis: Acute kidney injury superimposed on CKD  Assessment and Plan of Treatment: You had a kidney injury secondary to your infection and poor oral intake. Acute tubular necrosis (ATN) is a kidney disorder involving damage to the tubule cells of the kidneys, which can lead to acute kidney failure. The tubules are tiny ducts in the kidneys that help filter the blood when it passes through the kidneys. It usually takes 1-3 weeks for your kidneys to completely recover and for your creatinine to improve. You were given fluids to help your kidney function which helped to improve your kidneys. You refused rosenbaum placement. You were able to void successfully with a condom catheter while in the hospital.   Please followup with your PCP Dr Jefferson 9/20 11:30am to check your renal function (Cr).  Please followup with Dr Goldstein a kidney doctor 10/3 11:40 am to check your renal function.   Please follow up with urology      SECONDARY DISCHARGE DIAGNOSES  Diagnosis: Pneumonia, aspiration  Assessment and Plan of Treatment: You were diagnosed with pneumonia, an infection in your lungs secondary to aspiration. You were treated with antibiotics and began to show improvement.   Please continue taking cefpodoxime 200mg once daily until 8/25/22.   Please continue a minced and moist diet with chin tuck and sit upright when eating.  Please notify a healthcare provider if you experience shortness of breath.      Diagnosis: Hypertension  Assessment and Plan of Treatment: Your blood pressure was low while you were in the hospital and we held your lasix, coreg, enalapril and hydralazine. Please continue to hold them until you see Dr. Jefferson.     PRINCIPAL DISCHARGE DIAGNOSIS  Diagnosis: Acute kidney injury superimposed on CKD  Assessment and Plan of Treatment: You had a kidney injury secondary to your infection and poor oral intake. Acute tubular necrosis (ATN) is a kidney disorder involving damage to the tubule cells of the kidneys, which can lead to acute kidney failure. The tubules are tiny ducts in the kidneys that help filter the blood when it passes through the kidneys. It usually takes 1-3 weeks for your kidneys to completely recover and for your creatinine to improve. You were given fluids to help your kidney function which helped to improve your kidneys. You refused rosenbaum placement. You were able to void successfully with a condom catheter while in the hospital.   Please followup with your PCP Dr Jefferson 9/12 11:30am to check your renal function (Cr).  Please followup with Dr Goldstein a kidney doctor 10/3 11:40 am to check your renal function.   Please follow up with urology Dr Phipps 9/6      SECONDARY DISCHARGE DIAGNOSES  Diagnosis: Pneumonia, aspiration  Assessment and Plan of Treatment: You were diagnosed with pneumonia, an infection in your lungs secondary to aspiration. You were treated with antibiotics and began to show improvement.   Please continue taking cefpodoxime 200mg once daily until 8/25/22.   Please continue a minced and moist diet with chin tuck and sit upright when eating.  Please notify a healthcare provider if you experience shortness of breath.      Diagnosis: Hypertension  Assessment and Plan of Treatment: Your blood pressure was low while you were in the hospital and we held your lasix, coreg, enalapril and hydralazine. Please continue to hold them until you see Dr. Jefferson.     PRINCIPAL DISCHARGE DIAGNOSIS  Diagnosis: Acute kidney injury superimposed on CKD  Assessment and Plan of Treatment: You had a kidney injury secondary to your infection and poor oral intake. Acute tubular necrosis (ATN) is a kidney disorder involving damage to the tubule cells of the kidneys, which can lead to acute kidney failure. The tubules are tiny ducts in the kidneys that help filter the blood when it passes through the kidneys. It usually takes 1-3 weeks for your kidneys to completely recover and for your creatinine to improve. You were given fluids to help your kidney function which helped to improve your kidneys. You refused rosenbaum placement. You were able to void successfully with a condom catheter while in the hospital.   Please followup with your PCP Dr Jefferson 9/12 11:30am to check your renal function (Cr).  Please followup with Dr Goldstein a kidney doctor 10/3 11:40 am to check your renal function.   Please follow up with urology Dr Phipps 9/6 3:40PM to check your enlarge prostate and make sure you are not retaining urine      SECONDARY DISCHARGE DIAGNOSES  Diagnosis: Pneumonia, aspiration  Assessment and Plan of Treatment: You were diagnosed with pneumonia, an infection in your lungs secondary to aspiration. You were treated with antibiotics and began to show improvement.   Please continue taking IV zosyn 2.25g every 8 hours until 8/25/22.   Please continue a minced and moist diet with chin tuck and sit upright when eating.  Please notify a healthcare provider if you experience shortness of breath.      Diagnosis: Hypertension  Assessment and Plan of Treatment: Your blood pressure was low while you were in the hospital and we held your lasix, coreg, enalapril and hydralazine. Please continue to hold them until you see Dr. Jefferson.     PRINCIPAL DISCHARGE DIAGNOSIS  Diagnosis: Acute kidney injury superimposed on CKD  Assessment and Plan of Treatment: You had a kidney injury secondary to your infection and poor oral intake. Acute tubular necrosis (ATN) is a kidney disorder involving damage to the tubule cells of the kidneys, which can lead to acute kidney failure. The tubules are tiny ducts in the kidneys that help filter the blood when it passes through the kidneys. It usually takes 1-3 weeks for your kidneys to completely recover and for your creatinine to improve. You were given fluids to help your kidney function which helped to improve your kidneys. You refused rosenbaum placement. You were able to void successfully with a condom catheter while in the hospital.   Please followup with your PCP Dr Jefferson 9/12 11:30am to check your renal function (Cr).  Please followup with Dr Goldstein a kidney doctor 10/3 11:40 am to check your renal function.   Please follow up with urology Dr Phipps 9/6 3:40PM to check your enlarge prostate and make sure you are not retaining urine      SECONDARY DISCHARGE DIAGNOSES  Diagnosis: Hypertension  Assessment and Plan of Treatment: Your blood pressure was low while you were in the hospital and we held your lasix, coreg, enalapril and hydralazine. Please continue to hold them until you see Dr. Jefferson.    Diagnosis: Pneumonia, aspiration  Assessment and Plan of Treatment: You were diagnosed with pneumonia, an infection in your lungs secondary to aspiration. You were treated with antibiotics and began to show improvement.   You were treated with IV antibiotics, known as zosyn during your hospital stay. You completed the full course of antibiotics while you were in the hospital.   Please continue a minced and moist diet with chin tuck and sit upright when eating.  Please notify a healthcare provider if you experience shortness of breath.

## 2022-08-19 NOTE — DISCHARGE NOTE PROVIDER - NSDCFUSCHEDAPPT_GEN_ALL_CORE_FT
Aidan Manzanares  [facility]  NEPHRO [address]  Scheduled Appointment: 10/03/2022     Robert Phipps  [facility]  UROLOGY [address]  Scheduled Appointment: 09/06/2022    Aidan Manzanares  [facility]  NEPHRO [address]  Scheduled Appointment: 10/03/2022

## 2022-08-19 NOTE — PROGRESS NOTE ADULT - PROBLEM SELECTOR PLAN 6
Patient with history of BPH complicated by urinary retention, home medications Finasteride 5mg qd, and Tamsulosin 0.4mg qd.   - Continue home medications Stage II-III sacral wound     Plan:  - f/w wound care recs Pressure sacral wound    Plan:  - maintain pressure dressing

## 2022-08-19 NOTE — PROGRESS NOTE ADULT - PROBLEM SELECTOR PLAN 10
Patient with hyperlipidemia, home medication Lipitor 10mg qd.   - Continue home medication Patient with hypertension, home medications Coreg 3.125mg BID, Enalapril 5mg qd, Lasix 20mg qd, Hydralazine 50mg TID.  - Hold medications for now since patient was hypotensive

## 2022-08-19 NOTE — PROGRESS NOTE ADULT - PROBLEM SELECTOR PLAN 3
Patient with recent cough, sob, hypoxia at [facility] with evidence of aspiration pneumonitis, likely in setting of vomiting.  Patient currently continues to cough but no longer hypoxic. No infiltrates or consolidations on imaging.     Plan:  - S&S cleared for minced and moist with thin liquids  - Maintained for aspiration precautions

## 2022-08-19 NOTE — PROGRESS NOTE ADULT - PROBLEM SELECTOR PLAN 7
Hgb on admission 9.4 (baseline 8) normocytic, likely GM vs AOCD in setting of renal failure. Currently no signs of active bleeding (no hematochezia, melena, hemoptysis, hematuria)  - obtain iron panel  - trend CBC  - maintain active T&S (8/19)  - transfuse if Hgb <7 Patient with history of BPH complicated by urinary retention, home medications Finasteride 5mg qd, and Tamsulosin 0.4mg qd.   - Continue home medications

## 2022-08-19 NOTE — PROGRESS NOTE ADULT - PROBLEM SELECTOR PLAN 1
SADIQ on admission, BUN/Scr 98/4.26 (baseline Scr 2), likely postrenal given patient's history of urinary retention vs prerenal in setting of poor po intake and hypovolemia since patient is on Lasix vs recent UTI (patient was being treated with Levaquin in [facility] for reported UTI for 7 days starting 8.13 UCx from 8/3 with kleb pneumo.   Urology unable to place bedside Epstein, recommended cystoscopy assisted Eptsein. Pt adamantly declining.   - Presenting with metabolic acidosis likely secondary to elevated BUN   - FENa: 1.0% prerenal, Francine<20: patient dry   - urinary output:     Plan:  - c/w LR 500fluid resus cautiously in setting of LVEF <50%   - maintain on condom cath but still retaining  - bladder scan q6, straight cath if PVR>300  - trend Cr, avoid nephrotoxic drugs, renally dose meds  - F/u UA and Ucx  - f/u renal U/s   - renal following SADIQ on admission, BUN/Scr 98/4.26 (baseline Scr 2), likely postrenal given patient's history of urinary retention vs prerenal in setting of poor po intake and hypovolemia since patient is on Lasix vs recent UTI (patient was being treated with Levaquin in [facility] for reported UTI for 7 days starting 8.13 UCx from 8/3 with kleb pneumo. Resolved metabolic acidosis likely secondary to elevated BUN   Urology unable to place bedside Epstein, recommended cystoscopy assisted Epstein. Pt continues to decline  - Resolved metabolic acidosis likely secondary to elevated BUN   - FENa: 1.0% prerenal, Francine<20: patient dry   - urinary output last 24 hours through 8/19 5pm: 850cc    Plan:  - c/w LR 1L fluid resus cautiously in setting of LVEF <50%, repleting output  - maintain on condom cath   - bladder scan q6  - trend Cr, avoid nephrotoxic drugs, renally dose meds  - F/u UA and Ucx  - renal U/S demonstrating increased renal parenchymal echogenicity bilaterally, renal cysts  - renal following PMH, BPH, CKD. Presented with SADIQ on admission, BUN/Scr 98/4.26 (baseline Scr 2), recent UTI (patient was being treated with Levaquin in [facility] for reported UTI for 7 days starting 8.13 UCx from 8/3 with kleb pneumo. Resolved metabolic acidosis likely secondary to elevated BUN. Urology unable to place bedside Epstein, recommended cystoscopy assisted Epstein. Pt continues to decline. Resolved metabolic acidosis likely secondary to elevated BUN   - Likely secondary to prerenal (poor PO intake) vs ATN   - FENa: 1.0% prerenal, Francine<20  - renal U/S demonstrating intraparenchymal renal disease  - urinary output via condom cath last 24 hours through 8/19 5pm: 850cc    Plan:  - c/w LR 1L fluid resus cautiously in setting of LVEF <50%, repleting output  - bladder scan q6  - trend Cr, avoid nephrotoxic drugs, renally dose meds  - F/u UA and Ucx  - renal following

## 2022-08-19 NOTE — CONSULT NOTE ADULT - ASSESSMENT
89 y/o male PMH TIA, restrictive CM, CHF, HTN, HLD, Afib (on Eliquis), CKD (baseline Scr 2.0), BPH, urinary retention, limited mobility, brought from [facility] for oconcern of vomiting, hypoxia, sob, hypotension likely due to aspiration pna  Nephrology consulted for SADIQ on CKD.    INCOMPLETE 89 y/o male PMH TIA, restrictive CM, CHF, HTN, HLD, Afib (on Eliquis), CKD (baseline Scr 2.0), BPH, urinary retention, limited mobility, brought from [facility] for oconcern of vomiting, hypoxia, sob, hypotension likely due to aspiration pna  Nephrology consulted for SADIQ on CKD.    Assessment: Came in with SADIQ on CKD (baseline Cr reportedly ~2). Pt had multiple reasons to have SADIQ including poor PO intake, hypotension and obstruction 2/2 BPH induced urinary retention. FeNa suggested prerenal component but pt also has retention shown on bladder scans. Received fluids yesterday and Cr improved which supports prerenal etiology. Also may have ATN after having hypotension 2/2 sepsis.     Recommendations: INCOMPLETE  #SADIQ  Possibly multifactorial: prerenal vs ATN vs obstruction  -   - pt does not want cystoscopy guided rosenbaum at this time  - follow up renal US  - maintain MAP >70 for adequate renal perfusion  - avoid NSAIDS, PPIs and other nephrotoxic agents    #hyponatremia, hypovolemic  Na improved after receiving fluids, serum Na now 134.  - daily BMP    #metabolic acidosis- RESOLVED     89 y/o male PMH TIA, restrictive CM, CHF, HTN, HLD, Afib (on Eliquis), CKD (baseline Scr 2.0), BPH, urinary retention, limited mobility, brought from [facility] for oconcern of vomiting, hypoxia, sob, hypotension likely due to aspiration pna  Nephrology consulted for SADIQ on CKD.    Assessment: Came in with SADIQ on CKD (baseline Cr reportedly ~2). Pt had multiple reasons to have SADIQ including poor PO intake, hypotension and obstruction 2/2 BPH induced urinary retention. Given that obstructive component is chronic (BPH), SADIQ is more likely perfusion related to poor PO intake and hypotension. FeNa suggested prerenal component but pt also has retention shown on bladder scans--pt does not want cystoscopy guided rosenbaum however. Received fluids yesterday and Cr improved which supports prerenal etiology. Also may have ATN after having hypotension 2/2 sepsis. Appears dry on exam.    Recommendations: INCOMPLETE  #SADIQ  Possibly multifactorial: prerenal vs ATN vs obstruction  - encourage PO intake, fluids as per primary team if patient is not drinking enough  - pt does not want cystoscopy guided rosenbaum at this time, so any obstructive component of SADIQ may not resolve itself  - follow up renal US  - obtain UA  - maintain MAP >70 for adequate renal perfusion  - avoid NSAIDS, PPIs and other nephrotoxic agents    #hyponatremia, hypovolemic  Na improved after receiving fluids, serum Na now 134.  - daily BMP    #metabolic acidosis- RESOLVED      Recommendations not final until signed by Attending.     89 y/o male PMH TIA, restrictive CM, CHF, HTN, HLD, Afib (on Eliquis), CKD (baseline Scr 2.0), BPH, urinary retention, limited mobility, brought [facility] for oconcern of vomiting, hypoxia, sob, hypotension likely due to aspiration pna  Nephrology consulted for SADIQ on CKD.    Assessment: Came in with SADIQ on CKD (baseline Cr reportedly ~2). Pt had multiple reasons to have SADIQ including poor PO intake, hypotension and obstruction 2/2 BPH induced urinary retention. Given that obstructive component is chronic (BPH), SADIQ is more likely perfusion related to poor PO intake and hypotension. FeNa suggested prerenal component but pt also has retention shown on bladder scans--pt does not want cystoscopy guided rosenbaum however. Received fluids yesterday and Cr improved which supports prerenal etiology. Also may have ATN after having hypotension 2/2 sepsis. Appears dry on exam.    Recommendations: INCOMPLETE  #SADIQ  Possibly multifactorial: prerenal vs ATN vs obstruction  - recommend administering NS 50cc/hr for 10hrs  - obtain UA  - follow up renal US   - encourage PO intake  - pt does not want cystoscopy guided rosenbaum at this time, so any obstructive component of SADIQ may not resolve itself  - maintain MAP >70 for adequate renal perfusion  - avoid NSAIDS, PPIs and other nephrotoxic agents    #hyponatremia, hypovolemic  Na improved after receiving fluids, serum Na now 134.  - daily BMP    #metabolic acidosis- RESOLVED      Recommendations not final until signed by Attending.

## 2022-08-19 NOTE — PROGRESS NOTE ADULT - SUBJECTIVE AND OBJECTIVE BOX
Interventional, Pulmonary, Critical, Chest Special Procedures.    Pt was seen and fully examined by myself.     Time spent with patient in minutes:37    Patient is a 89y old  Male who presents with a chief complaint of sespsis (18 Aug 2022 21:25) the patient ill appearing, not really engaging today, eupneic on RA    HPI:  89 y/o male PMH TIA, restrictive CM, CHF, HTN, HLD, Afib (on Eliquis), CKD (baseline Scr 2.0), BPH, urinary retention, limited mobility, brought from [facility] for concern of vomiting, hypoxia, sob, hypotension. Patient not providing history, obtained from chart and ED provider.  Patient with several episodes of NBNB vomiting since yesterday wich subsequently onset of shortness of breath and cough which has been progressively working. At baseline patient does not use any supplemental oxygen but was requiring 4L NC and was hypotensive to 100/60. Per records from [facility] no recent fever, chills, rhinorrhea, chest pain, palpitations, headaches, abdominal pain, diarrhea, constipation, dysuria, urgency, frequency.  HCP/daughter: Celina Bass [phone]    Hospital Course:  Vitals: T 97.7, HR 93, /64, RR 22, SaO2 96% 4L   Labs: WBC 13.88, Hgb 9.4, CO2 20, BUN 98, Scr 4.26, Albumin 3.0, AST 78, , Trop 0.22 --> 0.20, BNP 2189  CT Chest: 1. Possible aspiration pneumonitis worse on the right. 2. Old granulomatous disease. Mild atelectasis in the right lung base. Diffuse areas of subtle ground-glass mostly in the right lung possibly an aspiration pneumonitis given the history. Peripheral interstitial opacities possibly chronic fibrosis.  CTAP:  Multiple hepatic cysts. Gallstones with no definite evidence of acute cholecystitis. Bilateral renal cysts. Mild prominence of the right renal collecting system without obstructing stone. Colonic and duodenal diverticulosis.    EKG: sinus tachycardia with 1st degree heart block prolonged QRS  Intervention: Zofran 4mg IV, NS 1L, CTX 1g, Flagyl 500mg   Consult: none  (17 Aug 2022 04:13)      REVIEW OF SYSTEMS:  Constitutional: No fever, weight loss, chills + fatigue  Eyes: No eye pain, visual disturbances, or discharge  ENMT:  + difficulty hearing, tinnitus, vertigo; No sinus or throat pain. No epistaxis, +dysphagia, dysphonia, hoarseness no odynophagia  Neck: No pain, stiffness or neck swelling.  No masses or deformities  Respiratory: +cough, no wheezing, hemoptysis  - COPD  - ILD   - PE   - ASTHMA     - PNEUMONIA  Cardiovascular: No chest pain, AF dysrhythmia, palpitations, dizziness or edema - CAD   + CHF   + HTN  Gastrointestinal: No abdominal or epigastric pain. No nausea, vomiting or hematemesis; No diarrhea or constipation. No melena or hematochezia, Icterus.          Genitourinary: No dysuria, frequency, hematuria or incontinence   +CKD/SADIQ      - ESRD  Neurological: No headaches, memory loss, loss of strength, numbness or tremors      +DEMENTIA     - STROKE    - SEIZURE  Skin: No itching, burning, rashes or lesions   Lymph Nodes: No enlarged glands  Endocrine: No heat or cold intolerance; No hair loss       - DM     - THYROID DISORDER  Musculoskeletal: No joint pain or swelling; No muscle, back or extremity pain, No edema  Psychiatric: No depression, anxiety, mood swings or difficulty sleeping  Heme/Lymph: No easy bruising or bleeding gums         - ANEMIA      - CANCER   -COAGULOPATHY  Allergy and Immunologic: No hives or eczema    PAST MEDICAL & SURGICAL HISTORY:  Chronic CHF      Hypertension      Hyperlipidemia      Chronic kidney disease (CKD)      BPH (benign prostatic hyperplasia)      Chronic atrial fibrillation        FAMILY HISTORY:    SOCIAL HISTORY:      - Tobacco     - ETOH    Allergies    No Known Allergies    Intolerances      Vital Signs Last 24 Hrs  T(C): 36.4 (19 Aug 2022 09:37), Max: 36.7 (18 Aug 2022 22:20)  T(F): 97.5 (19 Aug 2022 09:37), Max: 98 (18 Aug 2022 22:20)  HR: 89 (19 Aug 2022 09:37) (74 - 93)  BP: 101/57 (19 Aug 2022 09:37) (87/54 - 130/68)  BP(mean): --  RR: 17 (19 Aug 2022 09:37) (17 - 18)  SpO2: 98% (19 Aug 2022 09:37) (96% - 100%)    Parameters below as of 19 Aug 2022 09:37  Patient On (Oxygen Delivery Method): room air        08-18 @ 07:01  -  08-19 @ 07:00  --------------------------------------------------------  IN: 1500 mL / OUT: 750 mL / NET: 750 mL          MEDICATIONS:  MEDICATIONS  (STANDING):  albuterol/ipratropium for Nebulization 3 milliLiter(s) Nebulizer every 6 hours  apixaban 2.5 milliGRAM(s) Oral every 12 hours  atorvastatin 10 milliGRAM(s) Oral at bedtime  finasteride 5 milliGRAM(s) Oral every 24 hours  lactated ringers. 500 milliLiter(s) (50 mL/Hr) IV Continuous <Continuous>  piperacillin/tazobactam IVPB.. 2.25 Gram(s) IV Intermittent every 8 hours  polyethylene glycol 3350 17 Gram(s) Oral daily  senna 2 Tablet(s) Oral at bedtime  sertraline 50 milliGRAM(s) Oral every 24 hours  tamsulosin 0.4 milliGRAM(s) Oral at bedtime    MEDICATIONS  (PRN):  acetaminophen     Tablet .. 650 milliGRAM(s) Oral every 6 hours PRN Mild Pain (1 - 3)      PHYSICAL EXAM:  Un Comfortable, no distress  Eyes: PERRL, EOM intact; conjunctiva and sclera clear  Head: Normocephalic;  No Trauma  ENMT: No nasal discharge, hoarseness, +cough no hemoptysis  Neck: Supple; non tender; no masses or deformities.    No JVD  Respiratory:   - WHEEZING   + RHONCHI  - RALES  + CRACKLES.  Diminished breath sounds  BILATERAL  RIGHT  LEFT bases   Cardiovascular: Regular rate and rhythm. S1 and S2 Normal; No murmurs, gallops or rubs     - PPM/AICD  Gastrointestinal: Soft non-tender, non-distended; Normal bowel sounds; No hepatosplenomegaly.     -PEG    -  GT   - MOMIN  Genitourinary: No costovertebral angle tenderness. No dysuria  Extremities: AROM, No clubbing, cyanosis or edema    Vascular: Peripheral pulses palpable 2+ bilaterally  Neurological: Alert and responisve to stimuli   Skin: Warm and dry. No obvious rash  Lymph Nodes: No acute cervical or supraclavicular adenopathy  Psychiatric: Cooperative and appropriate mood    DEVICES:  - DENTURES   +IV R / L     - ETUBE   -TRACH   -CTUBE  R / L    LABS:                          7.8    7.62  )-----------( 251      ( 19 Aug 2022 05:30 )             23.5     08-19    134<L>  |  103  |  96<H>  ----------------------------<  106<H>  4.5   |  19<L>  |  4.01<H>    Ca    8.9      19 Aug 2022 05:30  Phos  3.8     08-19  Mg     2.2     08-19    TPro  4.7<L>  /  Alb  2.1<L>  /  TBili  0.5  /  DBili  x   /  AST  60<H>  /  ALT  25  /  AlkPhos  33<L>  08-19      RADIOLOGY & ADDITIONAL STUDIES (The following images were personally reviewed):< from: Xray Cinesophagram Swallow Function w/ Contrast (08.18.22 @ 16:20) >  ACC: [id number] EXAM:  XR SWAL FUNC SHABBIR VID CON STDY                          PROCEDURE DATE:  08/18/2022          INTERPRETATION:  Video Fluoroscopic Swallow Study dated 8/18/2022 9:13 AM    History: Aspiration pneumonia.    Prior Studies: None.    Fluoroscopy time: 3.8 minutes.    Findings:    Thin Liquid:  Penetration: Present.  Aspiration: Present.  Eliminated with posture/technique: Improved with chin tuck.    Puree:  Penetration: Present.  Aspiration: Present.  Eliminated with posture/technique: Improved with chin tuck.    Mechanical soft:  Penetration: Present.  Aspiration: Present.  Eliminated with posture/technique: Improved with chin tuck.    Anatomical findings: Exaggerated thoracic kyphosis.    Impression: As above.  Please see speech pathology report in the     < end of copied text >

## 2022-08-19 NOTE — PROGRESS NOTE ADULT - SUBJECTIVE AND OBJECTIVE BOX
Medicine Progress Note    SUBJECTIVE / OVERNIGHT EVENTS:  O/N events: Pt hypotensive 87/54. Given 1L bolus LR. Repeat BP 96/59. Given 500cc LR bolus with 500cc maintenance.   Patient was seen and examined at bedside. Reports not having an appetite because "he just doesn't want to eat." Otherwise negative ROS    MEDICATIONS  (STANDING):  albuterol/ipratropium for Nebulization 3 milliLiter(s) Nebulizer every 6 hours  apixaban 2.5 milliGRAM(s) Oral every 12 hours  atorvastatin 10 milliGRAM(s) Oral at bedtime  finasteride 5 milliGRAM(s) Oral every 24 hours  lactated ringers. 500 milliLiter(s) (50 mL/Hr) IV Continuous <Continuous>  piperacillin/tazobactam IVPB.. 2.25 Gram(s) IV Intermittent every 8 hours  polyethylene glycol 3350 17 Gram(s) Oral daily  senna 2 Tablet(s) Oral at bedtime  sertraline 50 milliGRAM(s) Oral every 24 hours  tamsulosin 0.4 milliGRAM(s) Oral at bedtime    MEDICATIONS  (PRN):  acetaminophen     Tablet .. 650 milliGRAM(s) Oral every 6 hours PRN Mild Pain (1 - 3)    CAPILLARY BLOOD GLUCOSE            OBJECTIVE:  Vital Signs Last 24 Hrs  T(C): 36.4 (19 Aug 2022 09:37), Max: 36.7 (18 Aug 2022 22:20)  T(F): 97.5 (19 Aug 2022 09:37), Max: 98 (18 Aug 2022 22:20)  HR: 89 (19 Aug 2022 09:37) (89 - 93)  BP: 101/57 (19 Aug 2022 09:37) (87/54 - 101/57)  BP(mean): --  RR: 17 (19 Aug 2022 09:37) (17 - 18)  SpO2: 98% (19 Aug 2022 09:37) (98% - 100%)    Parameters below as of 19 Aug 2022 09:37  Patient On (Oxygen Delivery Method): room air        PHYSICAL EXAM:  General: AAOx3, NAD  Head: NC/AT; MMM; PERRL; EOMI;  Neck: Supple; no JVD  Respiratory: CTAB; rhonchi b/l bases  Cardiovascular: Regular rhythm/rate; S1/S2+, no murmurs, rubs gallops   Gastrointestinal: Soft; NTND; bowel sounds normal and present  Extremities: WWP; no edema/cyanosis  Neurological: CNII-XII grossly intact; no obvious focal deficits  Skin: Clean and intact. Poor skin turgor. Without open wounds and sores  I&O's Summary    18 Aug 2022 07:01  -  19 Aug 2022 07:00  --------------------------------------------------------  IN: 1500 mL / OUT: 750 mL / NET: 750 mL    19 Aug 2022 07:01  -  19 Aug 2022 13:12  --------------------------------------------------------  IN: 0 mL / OUT: 350 mL / NET: -350 mL        LABS:                        7.8    7.62  )-----------( 251      ( 19 Aug 2022 05:30 )             23.5     08-19    134<L>  |  103  |  96<H>  ----------------------------<  106<H>  4.5   |  19<L>  |  4.01<H>    Ca    8.9      19 Aug 2022 05:30  Phos  3.8     08-19  Mg     2.2     08-19    TPro  4.7<L>  /  Alb  2.1<L>  /  TBili  0.5  /  DBili  x   /  AST  60<H>  /  ALT  25  /  AlkPhos  33<L>  08-19              Culture - Blood (collected 17 Aug 2022 01:46)  Source: .Blood Blood  Preliminary Report (19 Aug 2022 03:00):    No growth at 2 days.    Culture - Blood (collected 17 Aug 2022 01:46)  Source: .Blood Blood  Preliminary Report (19 Aug 2022 03:00):    No growth at 2 days.          RADIOLOGY & ADDITIONAL TESTS:  Imaging from Last 24 Hours: Medicine Progress Note    SUBJECTIVE / OVERNIGHT EVENTS:  O/N events: Pt hypotensive 87/54. Given 1L bolus LR. Repeat BP 96/59. Given 500cc LR bolus with 500cc maintenance.   Patient was seen and examined at bedside. Had headache in morning relieved by acetaminophen 650. Not having an appetite because "he just doesn't want to eat." Otherwise negative ROS    MEDICATIONS  (STANDING):  albuterol/ipratropium for Nebulization 3 milliLiter(s) Nebulizer every 6 hours  apixaban 2.5 milliGRAM(s) Oral every 12 hours  atorvastatin 10 milliGRAM(s) Oral at bedtime  finasteride 5 milliGRAM(s) Oral every 24 hours  lactated ringers. 500 milliLiter(s) (50 mL/Hr) IV Continuous <Continuous>  piperacillin/tazobactam IVPB.. 2.25 Gram(s) IV Intermittent every 8 hours  polyethylene glycol 3350 17 Gram(s) Oral daily  senna 2 Tablet(s) Oral at bedtime  sertraline 50 milliGRAM(s) Oral every 24 hours  tamsulosin 0.4 milliGRAM(s) Oral at bedtime    MEDICATIONS  (PRN):  acetaminophen     Tablet .. 650 milliGRAM(s) Oral every 6 hours PRN Mild Pain (1 - 3)    CAPILLARY BLOOD GLUCOSE            OBJECTIVE:  Vital Signs Last 24 Hrs  T(C): 36.4 (19 Aug 2022 09:37), Max: 36.7 (18 Aug 2022 22:20)  T(F): 97.5 (19 Aug 2022 09:37), Max: 98 (18 Aug 2022 22:20)  HR: 89 (19 Aug 2022 09:37) (89 - 93)  BP: 101/57 (19 Aug 2022 09:37) (87/54 - 101/57)  BP(mean): --  RR: 17 (19 Aug 2022 09:37) (17 - 18)  SpO2: 98% (19 Aug 2022 09:37) (98% - 100%)    Parameters below as of 19 Aug 2022 09:37  Patient On (Oxygen Delivery Method): room air        PHYSICAL EXAM:  General: AAOx3, NAD  Head: NC/AT; MMM; PERRL; EOMI;  Neck: Supple; no JVD  Respiratory: CTAB; rhonchi b/l bases  Cardiovascular: Regular rhythm/rate; S1/S2+, no murmurs, rubs gallops   Gastrointestinal: Soft; NTND; bowel sounds normal and present  Extremities: WWP; no edema/cyanosis  Neurological: CNII-XII grossly intact; no obvious focal deficits  Skin: Clean and intact. Poor skin turgor. Without open wounds and sores  I&O's Summary    18 Aug 2022 07:01  -  19 Aug 2022 07:00  --------------------------------------------------------  IN: 1500 mL / OUT: 750 mL / NET: 750 mL    19 Aug 2022 07:01  -  19 Aug 2022 13:12  --------------------------------------------------------  IN: 0 mL / OUT: 350 mL / NET: -350 mL        LABS:                        7.8    7.62  )-----------( 251      ( 19 Aug 2022 05:30 )             23.5     08-19    134<L>  |  103  |  96<H>  ----------------------------<  106<H>  4.5   |  19<L>  |  4.01<H>    Ca    8.9      19 Aug 2022 05:30  Phos  3.8     08-19  Mg     2.2     08-19    TPro  4.7<L>  /  Alb  2.1<L>  /  TBili  0.5  /  DBili  x   /  AST  60<H>  /  ALT  25  /  AlkPhos  33<L>  08-19              Culture - Blood (collected 17 Aug 2022 01:46)  Source: .Blood Blood  Preliminary Report (19 Aug 2022 03:00):    No growth at 2 days.    Culture - Blood (collected 17 Aug 2022 01:46)  Source: .Blood Blood  Preliminary Report (19 Aug 2022 03:00):    No growth at 2 days.          RADIOLOGY & ADDITIONAL TESTS:  Imaging from Last 24 Hours: Medicine Progress Note    SUBJECTIVE / OVERNIGHT EVENTS:  O/N events: Pt hypotensive 87/54. Given 1L bolus LR. Repeat BP 96/59. Given 500cc LR bolus with 500cc maintenance.   Patient was seen and examined at bedside. Had headache in morning relieved by acetaminophen 650. Doesn't want to eat because "he's not settled in yet." Otherwise negative ROS    MEDICATIONS  (STANDING):  albuterol/ipratropium for Nebulization 3 milliLiter(s) Nebulizer every 6 hours  apixaban 2.5 milliGRAM(s) Oral every 12 hours  atorvastatin 10 milliGRAM(s) Oral at bedtime  finasteride 5 milliGRAM(s) Oral every 24 hours  lactated ringers. 500 milliLiter(s) (50 mL/Hr) IV Continuous <Continuous>  piperacillin/tazobactam IVPB.. 2.25 Gram(s) IV Intermittent every 8 hours  polyethylene glycol 3350 17 Gram(s) Oral daily  senna 2 Tablet(s) Oral at bedtime  sertraline 50 milliGRAM(s) Oral every 24 hours  tamsulosin 0.4 milliGRAM(s) Oral at bedtime    MEDICATIONS  (PRN):  acetaminophen     Tablet .. 650 milliGRAM(s) Oral every 6 hours PRN Mild Pain (1 - 3)    CAPILLARY BLOOD GLUCOSE            OBJECTIVE:  Vital Signs Last 24 Hrs  T(C): 36.4 (19 Aug 2022 09:37), Max: 36.7 (18 Aug 2022 22:20)  T(F): 97.5 (19 Aug 2022 09:37), Max: 98 (18 Aug 2022 22:20)  HR: 89 (19 Aug 2022 09:37) (89 - 93)  BP: 101/57 (19 Aug 2022 09:37) (87/54 - 101/57)  BP(mean): --  RR: 17 (19 Aug 2022 09:37) (17 - 18)  SpO2: 98% (19 Aug 2022 09:37) (98% - 100%)    Parameters below as of 19 Aug 2022 09:37  Patient On (Oxygen Delivery Method): room air        PHYSICAL EXAM:  General: AAOx3, NAD  Head: NC/AT; MMM; PERRL; EOMI;  Neck: Supple; no JVD  Respiratory: CTAB; rhonchi b/l bases  Cardiovascular: Regular rhythm/rate; S1/S2+, no murmurs, rubs gallops   Gastrointestinal: Soft; NTND; bowel sounds normal and present  Extremities: WWP; no edema/cyanosis  Neurological: CNII-XII grossly intact; no obvious focal deficits  Skin: Clean and intact. Poor skin turgor. Without open wounds and sores  I&O's Summary    18 Aug 2022 07:01  -  19 Aug 2022 07:00  --------------------------------------------------------  IN: 1500 mL / OUT: 750 mL / NET: 750 mL    19 Aug 2022 07:01  -  19 Aug 2022 13:12  --------------------------------------------------------  IN: 0 mL / OUT: 350 mL / NET: -350 mL        LABS:                        7.8    7.62  )-----------( 251      ( 19 Aug 2022 05:30 )             23.5     08-19    134<L>  |  103  |  96<H>  ----------------------------<  106<H>  4.5   |  19<L>  |  4.01<H>    Ca    8.9      19 Aug 2022 05:30  Phos  3.8     08-19  Mg     2.2     08-19    TPro  4.7<L>  /  Alb  2.1<L>  /  TBili  0.5  /  DBili  x   /  AST  60<H>  /  ALT  25  /  AlkPhos  33<L>  08-19              Culture - Blood (collected 17 Aug 2022 01:46)  Source: .Blood Blood  Preliminary Report (19 Aug 2022 03:00):    No growth at 2 days.    Culture - Blood (collected 17 Aug 2022 01:46)  Source: .Blood Blood  Preliminary Report (19 Aug 2022 03:00):    No growth at 2 days.          RADIOLOGY & ADDITIONAL TESTS:  Imaging from Last 24 Hours:

## 2022-08-19 NOTE — DISCHARGE NOTE PROVIDER - CARE PROVIDERS DIRECT ADDRESSES
,DirectAddress_Unknown ,DirectAddress_Unknown,[email or web address] ,DirectAddress_Unknown,[email or web address]

## 2022-08-19 NOTE — ADVANCED PRACTICE NURSE CONSULT - RECOMMEDATIONS
Left and right heel pressure injuries: Wipe with Cavilon daily and leave open to air. Keep heel float boots on at all times while in bed.  Sacral pressure injury: Wipe intact skin with Cavilon daily, apply Tonia Protect (purple top) barrier cream over open skin. Cover with foam. Change daily and PRN for soiling.    Continue to turn and position q2 hours or patient tolerance.

## 2022-08-19 NOTE — DISCHARGE NOTE PROVIDER - NSDCMRMEDTOKEN_GEN_ALL_CORE_FT
carvedilol 3.125 mg oral tablet: 1 tab(s) orally 2 times a day  docusate potassium 100 mg oral capsule: 1 cap(s) orally 2 times a day  doxepin 3 mg oral tablet: 1 tab(s) orally once a day (at bedtime)  Eliquis 2.5 mg oral tablet: 1 tab(s) orally 2 times a day  enalapril 5 mg oral tablet: 1 tab(s) orally once a day  fenofibrate 145 mg oral tablet: 1 tab(s) orally once a day  finasteride 5 mg oral tablet: 1 tab(s) orally once a day  Flomax 0.4 mg oral capsule: 1 cap(s) orally once a day  furosemide 20 mg oral tablet: 1 tab(s) orally once a day  hydrALAZINE 50 mg oral tablet: 1 tab(s) orally every 8 hours  Lipitor 10 mg oral tablet: 1 tab(s) orally once a day  sertraline 50 mg oral tablet: 1 tab(s) orally once a day   carvedilol 3.125 mg oral tablet: 1 tab(s) orally 2 times a day  docusate potassium 100 mg oral capsule: 1 cap(s) orally 2 times a day  doxepin 3 mg oral tablet: 1 tab(s) orally once a day (at bedtime)  Eliquis 2.5 mg oral tablet: 1 tab(s) orally 2 times a day  fenofibrate 145 mg oral tablet: 1 tab(s) orally once a day  finasteride 5 mg oral tablet: 1 tab(s) orally once a day  Flomax 0.4 mg oral capsule: 1 cap(s) orally once a day  furosemide 20 mg oral tablet: 1 tab(s) orally once a day  Lipitor 10 mg oral tablet: 1 tab(s) orally once a day  sertraline 50 mg oral tablet: 1 tab(s) orally once a day   docusate potassium 100 mg oral capsule: 1 cap(s) orally 2 times a day  doxepin 3 mg oral tablet: 1 tab(s) orally once a day (at bedtime)  Eliquis 2.5 mg oral tablet: 1 tab(s) orally 2 times a day  fenofibrate 145 mg oral tablet: 1 tab(s) orally once a day  finasteride 5 mg oral tablet: 1 tab(s) orally once a day  Flomax 0.4 mg oral capsule: 1 cap(s) orally once a day  Lipitor 10 mg oral tablet: 1 tab(s) orally once a day  sertraline 50 mg oral tablet: 1 tab(s) orally once a day   cefpodoxime 200 mg oral tablet: 1 tab(s) orally once a day   docusate potassium 100 mg oral capsule: 1 cap(s) orally 2 times a day  doxepin 3 mg oral tablet: 1 tab(s) orally once a day (at bedtime)  Eliquis 2.5 mg oral tablet: 1 tab(s) orally 2 times a day  fenofibrate 145 mg oral tablet: 1 tab(s) orally once a day  finasteride 5 mg oral tablet: 1 tab(s) orally once a day  Flomax 0.4 mg oral capsule: 1 cap(s) orally once a day  Lipitor 10 mg oral tablet: 1 tab(s) orally once a day  sertraline 50 mg oral tablet: 1 tab(s) orally once a day   docusate potassium 100 mg oral capsule: 1 cap(s) orally 2 times a day  doxepin 3 mg oral tablet: 1 tab(s) orally once a day (at bedtime)  Eliquis 2.5 mg oral tablet: 1 tab(s) orally 2 times a day  fenofibrate 145 mg oral tablet: 1 tab(s) orally once a day  finasteride 5 mg oral tablet: 1 tab(s) orally once a day  Flomax 0.4 mg oral capsule: 1 cap(s) orally once a day  Lipitor 10 mg oral tablet: 1 tab(s) orally once a day  piperacillin-tazobactam: 2.25 gram(s) intravenous every 8 hours  sertraline 50 mg oral tablet: 1 tab(s) orally once a day

## 2022-08-20 NOTE — PROGRESS NOTE ADULT - ASSESSMENT
ASSESSMENT/PLAN89 y/o male PMH TIA, restrictive CM, CHF, HTN, HLD, Afib (on Eliquis), CKD (baseline Scr 2.0), BPH, urinary retention, limited mobility, brought from [facility] for vomiting, hypoxia, sob, hypotension likely due to aspiration pneumonitis,  UTI, found to have SADIQ on admission.    1. O2 4LNC at this time  2. Bronchodilators:  Atrovent/ albuterol q 4 – 6 hours as needed  3. Corticosteroids: off  4. ID/Antibiotics: continue Zosyn   5. Cardiac/HTN: optimize BP   6. GI: Rx/ prophylaxis c PPI/H2B  7. Heme: Rx/VT prophylaxis c SQH/SCD/AC pt on Eliquis  8. Aspiration precautions, Speech and Swallow feedback  9. nEPHROLOGY FEEDBACK    Discussed c mANAGING TEAM

## 2022-08-20 NOTE — PROGRESS NOTE ADULT - PROBLEM SELECTOR PLAN 3
Patient with recent cough, sob, hypoxia at [facility], [facility] with evidence of aspiration pneumonitis, likely in setting of vomiting.  Patient currently continues to cough but no longer hypoxic. No infiltrates or consolidations on imaging.     Plan:  - S&S cleared for minced and moist with thin liquids  - Maintained for aspiration precautions

## 2022-08-20 NOTE — PROGRESS NOTE ADULT - PROBLEM SELECTOR PLAN 8
Hgb on admission 9.4 (baseline 8) normocytic, likely GM vs AOCD in setting of renal failure. Currently no signs of active bleeding (no hematochezia, melena, hemoptysis, hematuria)  - f/u iron panel  - trend CBC  - maintain active T&S (8/19)  - transfuse if Hgb <7

## 2022-08-20 NOTE — PROGRESS NOTE ADULT - PROBLEM SELECTOR PLAN 1
PMH, BPH, CKD. Presented with SADIQ on admission, BUN/Scr 98/4.26 (baseline Scr 2), recent UTI (patient was being treated with Levaquin in NH for reported UTI for 7 days starting 8.13 UCx from 8/3 with kleb pneumo. Resolved metabolic acidosis likely secondary to elevated BUN. Urology unable to place bedside Epstein, recommended cystoscopy assisted Epstein. Pt continues to decline. Resolved metabolic acidosis likely secondary to elevated BUN   - Likely secondary to prerenal (poor PO intake) vs ATN   - FENa: 1.0% prerenal, Francine<20  - renal U/S demonstrating intraparenchymal renal disease  - urinary output via condom cath last 24 hours through 8/19 5pm: 850cc    Plan:  - c/w LR 1L fluid resus cautiously in setting of LVEF <50%, repleting output  - bladder scan q6  - trend Cr, avoid nephrotoxic drugs, renally dose meds  - F/u UA and Ucx  - renal following

## 2022-08-20 NOTE — PROGRESS NOTE ADULT - SUBJECTIVE AND OBJECTIVE BOX
Interventional, Pulmonary, Critical, Chest Special Procedures.    Pt was seen and fully examined by myself.     Time spent with patient in minutes:67    Patient is a 89y old  Male who presents with a chief complaint of sepsis (20 Aug 2022 08:32)patient ill appearing, not really engaging today, emaciated.     HPI:  89 y/o male PMH TIA, restrictive CM, CHF, HTN, HLD, Afib (on Eliquis), CKD (baseline Scr 2.0), BPH, urinary retention, limited mobility, brought from [facility] for concern of vomiting, hypoxia, sob, hypotension. Patient not providing history, obtained from chart and ED provider.  Patient with several episodes of NBNB vomiting since yesterday wich subsequently onset of shortness of breath and cough which has been progressively working. At baseline patient does not use any supplemental oxygen but was requiring 4L NC and was hypotensive to 100/60. Per records from [facility] no recent fever, chills, rhinorrhea, chest pain, palpitations, headaches, abdominal pain, diarrhea, constipation, dysuria, urgency, frequency.  HCP/daughter: Celina Bass [phone]    Hospital Course:  Vitals: T 97.7, HR 93, /64, RR 22, SaO2 96% 4L   Labs: WBC 13.88, Hgb 9.4, CO2 20, BUN 98, Scr 4.26, Albumin 3.0, AST 78, , Trop 0.22 --> 0.20, BNP 2189  CT Chest: 1. Possible aspiration pneumonitis worse on the right. 2. Old granulomatous disease. Mild atelectasis in the right lung base. Diffuse areas of subtle ground-glass mostly in the right lung possibly an aspiration pneumonitis given the history. Peripheral interstitial opacities possibly chronic fibrosis.  CTAP:  Multiple hepatic cysts. Gallstones with no definite evidence of acute cholecystitis. Bilateral renal cysts. Mild prominence of the right renal collecting system without obstructing stone. Colonic and duodenal diverticulosis.    EKG: sinus tachycardia with 1st degree heart block prolonged QRS  Intervention: Zofran 4mg IV, NS 1L, CTX 1g, Flagyl 500mg   Consult: none  (17 Aug 2022 04:13)      REVIEW OF SYSTEMS:  Constitutional: No fever, weight loss, chills + fatigue  Eyes: No eye pain, visual disturbances, or discharge  ENMT:  + difficulty hearing, tinnitus, vertigo; No sinus or throat pain. No epistaxis, +dysphagia, dysphonia, hoarseness no odynophagia  Neck: No pain, stiffness or neck swelling.  No masses or deformities  Respiratory: +cough, no wheezing, hemoptysis  - COPD  - ILD   - PE   - ASTHMA     - PNEUMONIA  Cardiovascular: No chest pain, AF dysrhythmia, palpitations, dizziness or edema - CAD   + CHF   + HTN  Gastrointestinal: No abdominal or epigastric pain. No nausea, vomiting or hematemesis; No diarrhea or constipation. No melena or hematochezia, Icterus.          Genitourinary: No dysuria, frequency, hematuria or incontinence   +CKD/SADIQ      - ESRD  Neurological: No headaches, memory loss, loss of strength, numbness or tremors      +DEMENTIA     - STROKE    - SEIZURE  Skin: No itching, burning, rashes or lesions   Lymph Nodes: No enlarged glands  Endocrine: No heat or cold intolerance; No hair loss       - DM     - THYROID DISORDER  Musculoskeletal: No joint pain or swelling; No muscle, back or extremity pain, No edema  Psychiatric: No depression, anxiety, mood swings or difficulty sleeping  Heme/Lymph: No easy bruising or bleeding gums         - ANEMIA      - CANCER   -COAGULOPATHY  Allergy and Immunologic: No hives or eczema    PAST MEDICAL & SURGICAL HISTORY:  Chronic CHF      Hypertension      Hyperlipidemia      Chronic kidney disease (CKD)      BPH (benign prostatic hyperplasia)      Chronic atrial fibrillation        FAMILY HISTORY:    SOCIAL HISTORY:      - Tobacco     - ETOH    Allergies    No Known Allergies    Intolerances      Vital Signs Last 24 Hrs  T(C): 35.7 (20 Aug 2022 10:40), Max: 36.4 (19 Aug 2022 15:25)  T(F): 96.2 (20 Aug 2022 10:40), Max: 97.6 (19 Aug 2022 15:25)  HR: 67 (20 Aug 2022 10:40) (67 - 96)  BP: 99/54 (20 Aug 2022 10:40) (82/51 - 151/83)  BP(mean): --  RR: 19 (20 Aug 2022 10:40) (17 - 19)  SpO2: 98% (20 Aug 2022 10:40) (96% - 100%)    Parameters below as of 20 Aug 2022 10:40  Patient On (Oxygen Delivery Method): room air        08-19 @ 07:01  -  08-20 @ 07:00  --------------------------------------------------------  IN: 0 mL / OUT: 600 mL / NET: -600 mL    08-20 @ 07:01  - 08-20 @ 15:15  --------------------------------------------------------  IN: 240 mL / OUT: 0 mL / NET: 240 mL          MEDICATIONS:  MEDICATIONS  (STANDING):  albuterol/ipratropium for Nebulization 3 milliLiter(s) Nebulizer every 6 hours  apixaban 2.5 milliGRAM(s) Oral every 12 hours  atorvastatin 10 milliGRAM(s) Oral at bedtime  finasteride 5 milliGRAM(s) Oral every 24 hours  lactated ringers. 1000 milliLiter(s) (60 mL/Hr) IV Continuous <Continuous>  piperacillin/tazobactam IVPB.. 2.25 Gram(s) IV Intermittent every 8 hours  polyethylene glycol 3350 17 Gram(s) Oral daily  senna 2 Tablet(s) Oral at bedtime  sertraline 50 milliGRAM(s) Oral every 24 hours  tamsulosin 0.4 milliGRAM(s) Oral at bedtime    MEDICATIONS  (PRN):  acetaminophen     Tablet .. 650 milliGRAM(s) Oral every 6 hours PRN Mild Pain (1 - 3)      PHYSICAL EXAM:  Un Comfortable, no distress  Eyes: PERRL, EOM intact; conjunctiva and sclera clear  Head: Normocephalic;  No Trauma  ENMT: No nasal discharge, hoarseness, +cough no hemoptysis  Neck: Supple; non tender; no masses or deformities.    No JVD  Respiratory:   - WHEEZING   + RHONCHI  - RALES  + CRACKLES.  Diminished breath sounds  BILATERAL  RIGHT  LEFT bases   Cardiovascular: Regular rate and rhythm. S1 and S2 Normal; No murmurs, gallops or rubs     - PPM/AICD  Gastrointestinal: Soft non-tender, non-distended; Normal bowel sounds; No hepatosplenomegaly.     -PEG    -  GT   - MOMIN  Genitourinary: No costovertebral angle tenderness. No dysuria  Extremities: AROM, No clubbing, cyanosis or edema    Vascular: Peripheral pulses palpable 2+ bilaterally  Neurological: Alert and responisve to stimuli   Skin: Warm and dry. No obvious rash  Lymph Nodes: No acute cervical or supraclavicular adenopathy  Psychiatric:m not really engaging    DEVICES:  - DENTURES   +IV R / L     - ETUBE   -TRACH   -CTUBE  R / L    LABS:                          7.7    8.12  )-----------( 248      ( 20 Aug 2022 07:48 )             24.2     08-20    133<L>  |  103  |  100<H>  ----------------------------<  81  4.4   |  15<L>  |  4.15<H>    Ca    8.9      20 Aug 2022 07:48  Phos  5.1     08-20  Mg     2.4     08-20    TPro  4.7<L>  /  Alb  2.2<L>  /  TBili  0.6  /  DBili  x   /  AST  70<H>  /  ALT  29  /  AlkPhos  34<L>  08-20      RADIOLOGY & ADDITIONAL STUDIES (The following images were personally reviewed):< from: US Retroperitoneal Complete (08.19.22 @ 12:15) >  ACC: [id number] EXAM:  US RETROPERITONEAL COMPLETE                          PROCEDURE DATE:  08/19/2022          INTERPRETATION:  CLINICAL INFORMATION: Acute kidney injury with history   of chronic kidney disease. Baseline creatinine of 2. Benign prostatic   hyperplasia.    COMPARISON: CT of the abdomen and pelvis dated 8/16/2022.    TECHNIQUE: Sonography of the kidneys and bladder.    FINDINGS:    Right kidney: 11.1 cm. 5.9 cm cyst in the midpole, a 1.8 cm cyst in the   mid/lower pole, a 1.9 cm cyst in the lower pole, and a 2.2 cm cyst in the   upper pole. No hydronephrosis or calculi. Increased parenchymal   parenchymal echogenicity.    Left kidney: 11.0 cm. 4.6 cm cyst in the upper pole and a 1.3 cm cyst   lower pole. No hydronephrosis or calculi. Increased parenchymal   parenchymal echogenicity.    Urinary bladder: There are no intraluminal filling defects. The prevoid   urinary bladder volume was 415 mL. Patient was unable to void or follow   instructions.    Prostate: 3.6 x 2.7 x 3.1 cm, with a calculated volume of 15.7 cc.    Right pleural effusion.    IMPRESSION:    1.  Increased renal parenchymal echogenicity bilaterally, consistent with   medical renal disease. Several renal cysts. No hydronephrosis.  2.  Right pleural effusion..    < end of copied text >

## 2022-08-20 NOTE — PROGRESS NOTE ADULT - ASSESSMENT
89 y/o male PMH TIA, restrictive CM, CHF, HTN, HLD, Afib (on Eliquis), CKD (baseline Scr 2.0), BPH, urinary retention, limited mobility, brought from [facility] for oconcern of vomiting, hypoxia, sob, hypotension likely due to aspiration pneumonia and SADIQ on CKD.     IVF as needed  monitor PO intake  --cont IV zosyn

## 2022-08-20 NOTE — PROGRESS NOTE ADULT - SUBJECTIVE AND OBJECTIVE BOX
Medicine Progress Note    SUBJECTIVE / OVERNIGHT EVENTS:   Appears at baseline negative ROS    MEDICATIONS  (STANDING):  albuterol/ipratropium for Nebulization 3 milliLiter(s) Nebulizer every 6 hours  apixaban 2.5 milliGRAM(s) Oral every 12 hours  atorvastatin 10 milliGRAM(s) Oral at bedtime  finasteride 5 milliGRAM(s) Oral every 24 hours  lactated ringers. 500 milliLiter(s) (50 mL/Hr) IV Continuous <Continuous>  piperacillin/tazobactam IVPB.. 2.25 Gram(s) IV Intermittent every 8 hours  polyethylene glycol 3350 17 Gram(s) Oral daily  senna 2 Tablet(s) Oral at bedtime  sertraline 50 milliGRAM(s) Oral every 24 hours  tamsulosin 0.4 milliGRAM(s) Oral at bedtime    MEDICATIONS  (PRN):  acetaminophen     Tablet .. 650 milliGRAM(s) Oral every 6 hours PRN Mild Pain (1 - 3)    CAPILLARY BLOOD GLUCOSE            Vital Signs Last 24 Hrs  T(C): 36.4 (20 Aug 2022 04:57), Max: 36.4 (19 Aug 2022 09:37)  T(F): 97.6 (20 Aug 2022 04:57), Max: 97.6 (19 Aug 2022 15:25)  HR: 96 (20 Aug 2022 04:57) (70 - 96)  BP: 151/83 (20 Aug 2022 04:57) (82/51 - 151/83)  BP(mean): --  RR: 17 (20 Aug 2022 04:57) (17 - 18)  SpO2: 96% (20 Aug 2022 04:57) (96% - 100%)    Parameters below as of 20 Aug 2022 04:57  Patient On (Oxygen Delivery Method): room air        Parameters below as of 19 Aug 2022 09:37  Patient On (Oxygen Delivery Method): room air        PHYSICAL EXAM:  General: AAOx3, NAD  Head: NC/AT; MMM; PERRL; EOMI;  Neck: Supple; no JVD  Respiratory: CTAB; rhonchi b/l bases  Cardiovascular: Regular rhythm/rate; S1/S2+, no murmurs, rubs gallops   Gastrointestinal: Soft; NTND; bowel sounds normal and present  Extremities: WWP; no edema/cyanosis  Neurological: CNII-XII grossly intact; no obvious focal deficits  Skin: Clean and intact. Poor skin turgor. Without open wounds and sores  I&O's Summary    18 Aug 2022 07:01  -  19 Aug 2022 07:00  --------------------------------------------------------  IN: 1500 mL / OUT: 750 mL / NET: 750 mL    19 Aug 2022 07:01  -  19 Aug 2022 13:12  --------------------------------------------------------  IN: 0 mL / OUT: 350 mL / NET: -350 mL                          7.7    8.12  )-----------( 248      ( 20 Aug 2022 07:48 )             24.2       LABS:                        7.8    7.62  )-----------( 251      ( 19 Aug 2022 05:30 )   08-20    133<L>  |  103  |  100<H>  ----------------------------<  81  4.4   |  15<L>  |  4.15<H>    Ca    8.9      20 Aug 2022 07:48  Phos  5.1     08-20  Mg     2.4     08-20    TPro  4.7<L>  /  Alb  2.2<L>  /  TBili  0.6  /  DBili  x   /  AST  70<H>  /  ALT  29  /  AlkPhos  34<L>  08-20            23.5     08-19    134<L>  |  103  |  96<H>  ----------------------------<  106<H>  4.5   |  19<L>  |  4.01<H>    Ca    8.9      19 Aug 2022 05:30  Phos  3.8     08-19  Mg     2.2     08-19    TPro  4.7<L>  /  Alb  2.1<L>  /  TBili  0.5  /  DBili  x   /  AST  60<H>  /  ALT  25  /  AlkPhos  33<L>  08-19              Culture - Blood (collected 17 Aug 2022 01:46)  Source: .Blood Blood  Preliminary Report (19 Aug 2022 03:00):    No growth at 2 days.    Culture - Blood (collected 17 Aug 2022 01:46)  Source: .Blood Blood  Preliminary Report (19 Aug 2022 03:00):    No growth at 2 days.          RADIOLOGY & ADDITIONAL TESTS:  Imaging from Last 24 Hours:

## 2022-08-21 NOTE — PROGRESS NOTE ADULT - ASSESSMENT
ASSESSMENT/PLAN89 y/o male PMH TIA, restrictive CM, CHF, HTN, HLD, Afib (on Eliquis), CKD (baseline Scr 2.0), BPH, urinary retention, limited mobility, brought from [facility] for vomiting, hypoxia, sob, hypotension likely due to aspiration pneumonitis,  UTI, found to have SADIQ on admission.    1. O2 attempt nopw off   2. Bronchodilators:  Atrovent/ albuterol q 4 – 6 hours as needed  3. Corticosteroids: off  4. ID/Antibiotics: continue Zosyn   5. Cardiac/HTN: optimize BP   6. GI: Rx/ prophylaxis c PPI/H2B  7. Heme: Rx/VT prophylaxis c SQH/SCD/AC pt on Eliquis  8. Aspiration precautions, Speech and Swallow feedback    Discussed c managing team

## 2022-08-21 NOTE — PROGRESS NOTE ADULT - SUBJECTIVE AND OBJECTIVE BOX
Patient is a 89y Male seen and evaluated at bedside seen this morning, remains hypotensive. Received IV bicarb gtt over last 24 hours with improvement in creatinine and bicarb. Remains overall dry appearing and likely is not consuming enough on his own. Discussed with nurse and PCA to help with feeds.    Meds:    acetaminophen     Tablet .. 650 every 6 hours PRN  albuterol/ipratropium for Nebulization 3 every 6 hours  apixaban 2.5 every 12 hours  atorvastatin 10 at bedtime  finasteride 5 every 24 hours  lactated ringers. 1000 <Continuous>  piperacillin/tazobactam IVPB.. 2.25 every 8 hours  polyethylene glycol 3350 17 daily  senna 2 at bedtime  sertraline 50 every 24 hours  tamsulosin 0.4 at bedtime      T(C): , Max: 37.1 (08-21-22 @ 04:49)  T(F): , Max: 98.7 (08-21-22 @ 04:49)  HR: 79 (08-21-22 @ 04:49)  BP: 96/75 (08-21-22 @ 04:49)  BP(mean): 66 (08-20-22 @ 21:10)  RR: 16 (08-21-22 @ 04:49)  SpO2: 96% (08-21-22 @ 04:49)  Wt(kg): --    08-20 @ 07:01  -  08-21 @ 07:00  --------------------------------------------------------  IN: 1640 mL / OUT: 700 mL / NET: 940 mL      Review of Systems:  ROS negative except as per HPI      PHYSICAL EXAM:  GENERAL: laying in bed, NAD  HEENT: dry MM. neck supple, no JVP  CHEST/LUNG: speaking in full sentences, no accessory muscle use  HEART: Regular rate and rhythm, no murmur, no gallops, no rub   ABDOMEN: Soft, nontender, mildly distended  : No flank or supra pubic fullness but no tenderness.  EXTREMITIES: no pedal edema  Neurology: AAOx3    LABS:                        7.6    7.24  )-----------( 245      ( 21 Aug 2022 10:28 )             22.5     08-21    137  |  103  |  98<H>  ----------------------------<  91  3.5   |  21<L>  |  3.85<H>    Ca    8.8      21 Aug 2022 10:28  Phos  4.9     08-21  Mg     2.3     08-21    TPro  4.7<L>  /  Alb  2.2<L>  /  TBili  0.6  /  DBili  x   /  AST  70<H>  /  ALT  29  /  AlkPhos  34<L>  08-20    Osmolality, Serum: 314 mosm/kg *H* [280 - 301] (08-20 @ 12:32)        Sodium, Random Urine: <20 mmol/L (08-20 @ 10:43)  Osmolality, Random Urine: 370 mosm/kg (08-20 @ 10:43)  Creatinine, Random Urine: 67 mg/dL (08-20 @ 10:43)        RADIOLOGY & ADDITIONAL STUDIES:

## 2022-08-21 NOTE — PROGRESS NOTE ADULT - ASSESSMENT
89 y/o male PMH TIA, restrictive CM, CHF, HTN, HLD, Afib (on Eliquis), CKD (baseline Scr 2.0), BPH, urinary retention, limited mobility, brought from [facility] for oconcern of vomiting, hypoxia, sob, hypotension likely due to aspiration pneumonia and SADIQ on CKD.     IVF as needed  monitor PO intake  --cont IV zosyn  refuses care at times  : rosenbaum refused

## 2022-08-21 NOTE — PROGRESS NOTE ADULT - PROBLEM SELECTOR PLAN 9
Hgb on admission 9.4 (baseline 8) normocytic, likely GM vs AOCD in setting of renal failure. Currently no signs of active bleeding (no hematochezia, melena, hemoptysis, hematuria)  - Anemia of chronic disease: normal Fe, high ferritin     - trend CBC  - maintain active T&S (8/19)  - transfuse if Hgb <7

## 2022-08-21 NOTE — PROGRESS NOTE ADULT - ASSESSMENT
89 y/o male PMH TIA, restrictive CM, CHF, HTN, HLD, Afib (on Eliquis), CKD (baseline Scr 2.0), BPH, urinary retention, limited mobility, brought from [facility] for oconcern of vomiting, hypoxia, sob, hypotension likely due to aspiration pneumonia and SADIQ on CKD.     IVF as needed  monitor PO intake  --cont IV zosyn

## 2022-08-21 NOTE — PROGRESS NOTE ADULT - PROBLEM SELECTOR PLAN 6
RESOLVED. Patient A&Ox1 on admission, unknown baseline, unclear if there is underlying dementia of if this is an acute process.   Likely 2/2 to uremia. Patient now AAOx3    - continue to monitor          # Elevated troponin   PMH constrictive CM  Patient with troponin 0.22 on admission which trended down to 0.2, likely elevated in setting of SADIQ and CKD.   - troponin downtrending 0.2-> 0.18

## 2022-08-21 NOTE — PROGRESS NOTE ADULT - PROBLEM SELECTOR PLAN 5
RESOLVED. Patient A&Ox1 on admission, unknown baseline, unclear if there is underlying dementia of if this is an acute process.  Daughter (HCP) reports she is estranged from father and does not know his current mental status.          # Elevated troponin   PMH constrictive CM  Patient with troponin 0.22 on admission which trended down to 0.2, likely elevated in setting of SADIQ and CKD.   - troponin downtrending 0.2-> 0.18 RESOLVED. Patient with nausea and vomiting for two days, possibly in setting of uremia or UTI.  Other differential include viral gastroenteritis, no evidence of colitis or most severe GI infection on imaging and no reported diarrhea.  Cardiac etiology unlikely, patient without symptoms, no ischemia on EKG, trop likely up in setting of renal failure now peaked.  - CTH: no acute pathology     Plan:  - Continue Zofran PRN vomiting

## 2022-08-21 NOTE — PROGRESS NOTE ADULT - SUBJECTIVE AND OBJECTIVE BOX
Medicine Progress Note (INCOMPLETE)    SUBJECTIVE / OVERNIGHT EVENTS:  O/N events:  Patient was seen and examined at bedside.  Otherwise negative ROS    MEDICATIONS  (STANDING):  albuterol/ipratropium for Nebulization 3 milliLiter(s) Nebulizer every 6 hours  apixaban 2.5 milliGRAM(s) Oral every 12 hours  atorvastatin 10 milliGRAM(s) Oral at bedtime  finasteride 5 milliGRAM(s) Oral every 24 hours  piperacillin/tazobactam IVPB.. 2.25 Gram(s) IV Intermittent every 8 hours  polyethylene glycol 3350 17 Gram(s) Oral daily  potassium chloride   Powder 40 milliEquivalent(s) Oral once  senna 2 Tablet(s) Oral at bedtime  sertraline 50 milliGRAM(s) Oral every 24 hours  tamsulosin 0.4 milliGRAM(s) Oral at bedtime    MEDICATIONS  (PRN):  acetaminophen     Tablet .. 650 milliGRAM(s) Oral every 6 hours PRN Mild Pain (1 - 3)    CAPILLARY BLOOD GLUCOSE            OBJECTIVE:  Vital Signs Last 24 Hrs  T(C): 37.1 (21 Aug 2022 04:49), Max: 37.1 (21 Aug 2022 04:49)  T(F): 98.7 (21 Aug 2022 04:49), Max: 98.7 (21 Aug 2022 04:49)  HR: 79 (21 Aug 2022 04:49) (79 - 94)  BP: 96/75 (21 Aug 2022 04:49) (96/75 - 99/62)  BP(mean): 66 (20 Aug 2022 21:10) (66 - 66)  RR: 16 (21 Aug 2022 04:49) (16 - 17)  SpO2: 96% (21 Aug 2022 04:49) (96% - 99%)    Parameters below as of 21 Aug 2022 04:49  Patient On (Oxygen Delivery Method): room air        PHYSICAL EXAM:  General: AAOx3, NAD  Head: NC/AT; MMM; PERRL; EOMI;  Neck: Supple; no JVD  Respiratory: CTAB; no wheezes/rales/rhonchi  Cardiovascular: Regular rhythm/rate; S1/S2+, no murmurs, rubs gallops   Gastrointestinal: Soft; NTND; bowel sounds normal and present  Extremities: WWP; no edema/cyanosis  Neurological: CNII-XII grossly intact; no obvious focal deficits  Skin: Clean and intact. Good skin turgor. Without open wounds and sores  I&O's Summary    20 Aug 2022 07:01  -  21 Aug 2022 07:00  --------------------------------------------------------  IN: 1640 mL / OUT: 700 mL / NET: 940 mL        LABS:                        7.6    7.24  )-----------( 245      ( 21 Aug 2022 10:28 )             22.5     08-21    137  |  103  |  98<H>  ----------------------------<  91  3.5   |  21<L>  |  3.85<H>    Ca    8.8      21 Aug 2022 10:28  Phos  4.9     08-21  Mg     2.3     08-21    TPro  4.7<L>  /  Alb  2.2<L>  /  TBili  0.6  /  DBili  x   /  AST  70<H>  /  ALT  29  /  AlkPhos  34<L>  08-20                  RADIOLOGY & ADDITIONAL TESTS:  Imaging from Last 24 Hours: Medicine Progress Note     SUBJECTIVE / OVERNIGHT EVENTS:  O/N events: No acute events overnight  Patient was seen and examined at bedside, irritable since he didn't sleep well. Encouraged PO intake. Reports improvement in SOB. Denies cp, fever, chills, n/v.  Otherwise negative ROS    MEDICATIONS  (STANDING):  albuterol/ipratropium for Nebulization 3 milliLiter(s) Nebulizer every 6 hours  apixaban 2.5 milliGRAM(s) Oral every 12 hours  atorvastatin 10 milliGRAM(s) Oral at bedtime  finasteride 5 milliGRAM(s) Oral every 24 hours  piperacillin/tazobactam IVPB.. 2.25 Gram(s) IV Intermittent every 8 hours  polyethylene glycol 3350 17 Gram(s) Oral daily  potassium chloride   Powder 40 milliEquivalent(s) Oral once  senna 2 Tablet(s) Oral at bedtime  sertraline 50 milliGRAM(s) Oral every 24 hours  tamsulosin 0.4 milliGRAM(s) Oral at bedtime    MEDICATIONS  (PRN):  acetaminophen     Tablet .. 650 milliGRAM(s) Oral every 6 hours PRN Mild Pain (1 - 3)    CAPILLARY BLOOD GLUCOSE            OBJECTIVE:  Vital Signs Last 24 Hrs  T(C): 37.1 (21 Aug 2022 04:49), Max: 37.1 (21 Aug 2022 04:49)  T(F): 98.7 (21 Aug 2022 04:49), Max: 98.7 (21 Aug 2022 04:49)  HR: 79 (21 Aug 2022 04:49) (79 - 94)  BP: 96/75 (21 Aug 2022 04:49) (96/75 - 99/62)  BP(mean): 66 (20 Aug 2022 21:10) (66 - 66)  RR: 16 (21 Aug 2022 04:49) (16 - 17)  SpO2: 96% (21 Aug 2022 04:49) (96% - 99%)    Parameters below as of 21 Aug 2022 04:49  Patient On (Oxygen Delivery Method): room air        PHYSICAL EXAM:  General: AAOx3, NAD  Head: NC/AT; MMM; PERRL; EOMI;  Neck: Supple; no JVD  Respiratory: CTAB; no rales/rhonchi  Cardiovascular: Regular rhythm/rate; S1/S2+, no murmurs, rubs gallops   Gastrointestinal: Soft; NTND; bowel sounds normal and present  Extremities: WWP; no edema/cyanosis, feet wrapped in soft boots   Neurological: CNII-XII grossly intact; no obvious focal deficits  Skin: Clean and intact. Good skin turgor. saccral wound, R heel wound covered with pressure dressings  I&O's Summary    20 Aug 2022 07:01  -  21 Aug 2022 07:00  --------------------------------------------------------  IN: 1640 mL / OUT: 700 mL / NET: 940 mL        LABS:                        7.6    7.24  )-----------( 245      ( 21 Aug 2022 10:28 )             22.5     08-21    137  |  103  |  98<H>  ----------------------------<  91  3.5   |  21<L>  |  3.85<H>    Ca    8.8      21 Aug 2022 10:28  Phos  4.9     08-21  Mg     2.3     08-21    TPro  4.7<L>  /  Alb  2.2<L>  /  TBili  0.6  /  DBili  x   /  AST  70<H>  /  ALT  29  /  AlkPhos  34<L>  08-20                  RADIOLOGY & ADDITIONAL TESTS:  Imaging from Last 24 Hours: Medicine Progress Note     SUBJECTIVE / OVERNIGHT EVENTS:  O/N events: No acute events overnight. PVR 06:00: 348  Patient was seen and examined at bedside, irritable since he didn't sleep well. Encouraged PO intake. Reports improvement in SOB. Denies cp, fever, chills, n/v.  Otherwise negative ROS    MEDICATIONS  (STANDING):  albuterol/ipratropium for Nebulization 3 milliLiter(s) Nebulizer every 6 hours  apixaban 2.5 milliGRAM(s) Oral every 12 hours  atorvastatin 10 milliGRAM(s) Oral at bedtime  finasteride 5 milliGRAM(s) Oral every 24 hours  piperacillin/tazobactam IVPB.. 2.25 Gram(s) IV Intermittent every 8 hours  polyethylene glycol 3350 17 Gram(s) Oral daily  potassium chloride   Powder 40 milliEquivalent(s) Oral once  senna 2 Tablet(s) Oral at bedtime  sertraline 50 milliGRAM(s) Oral every 24 hours  tamsulosin 0.4 milliGRAM(s) Oral at bedtime    MEDICATIONS  (PRN):  acetaminophen     Tablet .. 650 milliGRAM(s) Oral every 6 hours PRN Mild Pain (1 - 3)    CAPILLARY BLOOD GLUCOSE            OBJECTIVE:  Vital Signs Last 24 Hrs  T(C): 37.1 (21 Aug 2022 04:49), Max: 37.1 (21 Aug 2022 04:49)  T(F): 98.7 (21 Aug 2022 04:49), Max: 98.7 (21 Aug 2022 04:49)  HR: 79 (21 Aug 2022 04:49) (79 - 94)  BP: 96/75 (21 Aug 2022 04:49) (96/75 - 99/62)  BP(mean): 66 (20 Aug 2022 21:10) (66 - 66)  RR: 16 (21 Aug 2022 04:49) (16 - 17)  SpO2: 96% (21 Aug 2022 04:49) (96% - 99%)    Parameters below as of 21 Aug 2022 04:49  Patient On (Oxygen Delivery Method): room air        PHYSICAL EXAM:  General: AAOx3, NAD  Head: NC/AT; MMM; PERRL; EOMI;  Neck: Supple; no JVD  Respiratory: CTAB; no rales/rhonchi  Cardiovascular: Regular rhythm/rate; S1/S2+, no murmurs, rubs gallops   Gastrointestinal: Soft; NTND; bowel sounds normal and present  Extremities: WWP; no edema/cyanosis, feet wrapped in soft boots   Neurological: CNII-XII grossly intact; no obvious focal deficits  Skin: Clean and intact. Good skin turgor. saccral wound, R heel wound covered with pressure dressings  I&O's Summary    20 Aug 2022 07:01  -  21 Aug 2022 07:00  --------------------------------------------------------  IN: 1640 mL / OUT: 700 mL / NET: 940 mL        LABS:                        7.6    7.24  )-----------( 245      ( 21 Aug 2022 10:28 )             22.5     08-21    137  |  103  |  98<H>  ----------------------------<  91  3.5   |  21<L>  |  3.85<H>    Ca    8.8      21 Aug 2022 10:28  Phos  4.9     08-21  Mg     2.3     08-21    TPro  4.7<L>  /  Alb  2.2<L>  /  TBili  0.6  /  DBili  x   /  AST  70<H>  /  ALT  29  /  AlkPhos  34<L>  08-20                  RADIOLOGY & ADDITIONAL TESTS:  Imaging from Last 24 Hours: Medicine Progress Note     SUBJECTIVE / OVERNIGHT EVENTS:  O/N events: No acute events overnight. PVR 06:00: 348  Patient was seen and examined at bedside, irritable since he didn't sleep well. Pt spoke to daughter Emerita on the phone who encouraged him to eat and get Epstein, patient still adamantly refusing Epstein despite understanding risks of not getting one. Reports improvement in SOB. Denies cp, fever, chills, n/v.  Otherwise negative ROS    MEDICATIONS  (STANDING):  albuterol/ipratropium for Nebulization 3 milliLiter(s) Nebulizer every 6 hours  apixaban 2.5 milliGRAM(s) Oral every 12 hours  atorvastatin 10 milliGRAM(s) Oral at bedtime  finasteride 5 milliGRAM(s) Oral every 24 hours  piperacillin/tazobactam IVPB.. 2.25 Gram(s) IV Intermittent every 8 hours  polyethylene glycol 3350 17 Gram(s) Oral daily  potassium chloride   Powder 40 milliEquivalent(s) Oral once  senna 2 Tablet(s) Oral at bedtime  sertraline 50 milliGRAM(s) Oral every 24 hours  tamsulosin 0.4 milliGRAM(s) Oral at bedtime    MEDICATIONS  (PRN):  acetaminophen     Tablet .. 650 milliGRAM(s) Oral every 6 hours PRN Mild Pain (1 - 3)    CAPILLARY BLOOD GLUCOSE            OBJECTIVE:  Vital Signs Last 24 Hrs  T(C): 37.1 (21 Aug 2022 04:49), Max: 37.1 (21 Aug 2022 04:49)  T(F): 98.7 (21 Aug 2022 04:49), Max: 98.7 (21 Aug 2022 04:49)  HR: 79 (21 Aug 2022 04:49) (79 - 94)  BP: 96/75 (21 Aug 2022 04:49) (96/75 - 99/62)  BP(mean): 66 (20 Aug 2022 21:10) (66 - 66)  RR: 16 (21 Aug 2022 04:49) (16 - 17)  SpO2: 96% (21 Aug 2022 04:49) (96% - 99%)    Parameters below as of 21 Aug 2022 04:49  Patient On (Oxygen Delivery Method): room air        PHYSICAL EXAM:  General: AAOx3, NAD  Head: NC/AT; MMM; PERRL; EOMI;  Neck: Supple; no JVD  Respiratory: CTAB; no rales/rhonchi  Cardiovascular: Regular rhythm/rate; S1/S2+, no murmurs, rubs gallops   Gastrointestinal: Soft; NTND; bowel sounds normal and present  Extremities: WWP; no edema/cyanosis, feet wrapped in soft boots   Neurological: CNII-XII grossly intact; no obvious focal deficits  Skin: Clean and intact. Good skin turgor. saccral wound, R heel wound covered with pressure dressings  I&O's Summary    20 Aug 2022 07:01  -  21 Aug 2022 07:00  --------------------------------------------------------  IN: 1640 mL / OUT: 700 mL / NET: 940 mL        LABS:                        7.6    7.24  )-----------( 245      ( 21 Aug 2022 10:28 )             22.5     08-21    137  |  103  |  98<H>  ----------------------------<  91  3.5   |  21<L>  |  3.85<H>    Ca    8.8      21 Aug 2022 10:28  Phos  4.9     08-21  Mg     2.3     08-21    TPro  4.7<L>  /  Alb  2.2<L>  /  TBili  0.6  /  DBili  x   /  AST  70<H>  /  ALT  29  /  AlkPhos  34<L>  08-20                  RADIOLOGY & ADDITIONAL TESTS:  Imaging from Last 24 Hours:

## 2022-08-21 NOTE — PROGRESS NOTE ADULT - PROBLEM SELECTOR PLAN 4
Patient with recent cough, sob, hypoxia at [facility], [facility] with evidence of aspiration pneumonitis, likely in setting of vomiting.  Patient currently continues to cough but no longer hypoxic. No infiltrates or consolidations on imaging.     Plan:  - S&S cleared for minced and moist with thin liquids  - Maintain aspiration precautions

## 2022-08-21 NOTE — PROGRESS NOTE ADULT - PROBLEM SELECTOR PLAN 9
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  Patient clinically euvolemic on exam.  TTE: LVEF 45-50% with global hypokinesis. Abnormal septal motion seen due to a left bundle branch block. Mild MR, mild AR, trivial PCD effusion  - Euvolemic on exam, - JVD, - b/l L/E edema, current b/l base crackles likely secondary to asp PNA    Plan:   - strict I/Os, monitor UOP carefully Hgb on admission 9.4 (baseline 8) normocytic, likely GM vs AOCD in setting of renal failure. Currently no signs of active bleeding (no hematochezia, melena, hemoptysis, hematuria)  - Anemia of chronic disease: normal Fe, high ferritin     - trend CBC  - maintain active T&S (8/19)  - transfuse if Hgb <7

## 2022-08-21 NOTE — PROGRESS NOTE ADULT - SUBJECTIVE AND OBJECTIVE BOX
Interventional, Pulmonary, Critical, Chest Special Procedures.    Pt was seen and fully examined by myself.     Time spent with patient in minutes: 37    Patient is a 89y old  Male who presents with a chief complaint of asp PNA, SADIQ on CKD (21 Aug 2022 11:18)The  patient ill appearing, not really engaging today,    HPI:  89 y/o male PMH TIA, restrictive CM, CHF, HTN, HLD, Afib (on Eliquis), CKD (baseline Scr 2.0), BPH, urinary retention, limited mobility, brought from [facility] for concern of vomiting, hypoxia, sob, hypotension. Patient not providing history, obtained from chart and ED provider.  Patient with several episodes of NBNB vomiting since yesterday wich subsequently onset of shortness of breath and cough which has been progressively working. At baseline patient does not use any supplemental oxygen but was requiring 4L NC and was hypotensive to 100/60. Per records from [facility] no recent fever, chills, rhinorrhea, chest pain, palpitations, headaches, abdominal pain, diarrhea, constipation, dysuria, urgency, frequency.  HCP/daughter: Celina Bass [phone]    Hospital Course:  Vitals: T 97.7, HR 93, /64, RR 22, SaO2 96% 4L   Labs: WBC 13.88, Hgb 9.4, CO2 20, BUN 98, Scr 4.26, Albumin 3.0, AST 78, , Trop 0.22 --> 0.20, BNP 2189  CT Chest: 1. Possible aspiration pneumonitis worse on the right. 2. Old granulomatous disease. Mild atelectasis in the right lung base. Diffuse areas of subtle ground-glass mostly in the right lung possibly an aspiration pneumonitis given the history. Peripheral interstitial opacities possibly chronic fibrosis.  CTAP:  Multiple hepatic cysts. Gallstones with no definite evidence of acute cholecystitis. Bilateral renal cysts. Mild prominence of the right renal collecting system without obstructing stone. Colonic and duodenal diverticulosis.    EKG: sinus tachycardia with 1st degree heart block prolonged QRS  Intervention: Zofran 4mg IV, NS 1L, CTX 1g, Flagyl 500mg   Consult: none  (17 Aug 2022 04:13)      REVIEW OF SYSTEMS:  Constitutional: No fever, weight loss, chills + fatigue  Eyes: No eye pain, visual disturbances, or discharge  ENMT:  + difficulty hearing, tinnitus, vertigo; No sinus or throat pain. No epistaxis, +dysphagia, dysphonia, hoarseness no odynophagia  Neck: No pain, stiffness or neck swelling.  No masses or deformities  Respiratory: +cough, no wheezing, hemoptysis  - COPD  - ILD   - PE   - ASTHMA     - PNEUMONIA  Cardiovascular: No chest pain, AF dysrhythmia, palpitations, dizziness or edema - CAD   + CHF   + HTN  Gastrointestinal: No abdominal or epigastric pain. No nausea, vomiting or hematemesis; No diarrhea or constipation. No melena or hematochezia, Icterus.          Genitourinary: No dysuria, frequency, hematuria or incontinence   +CKD/SADIQ      - ESRD  Neurological: No headaches, memory loss, loss of strength, numbness or tremors      +DEMENTIA     - STROKE    - SEIZURE  Skin: No itching, burning, rashes or lesions   Lymph Nodes: No enlarged glands  Endocrine: No heat or cold intolerance; No hair loss       - DM     - THYROID DISORDER  Musculoskeletal: No joint pain or swelling; No muscle, back or extremity pain, No edema  Psychiatric: No depression, anxiety, mood swings or difficulty sleeping  Heme/Lymph: No easy bruising or bleeding gums         - ANEMIA      - CANCER   -COAGULOPATHY  Allergy and Immunologic: No hives or eczema    PAST MEDICAL & SURGICAL HISTORY:  Chronic CHF      Hypertension      Hyperlipidemia      Chronic kidney disease (CKD)      BPH (benign prostatic hyperplasia)      Chronic atrial fibrillation        FAMILY HISTORY:    SOCIAL HISTORY:      - Tobacco     - ETOH    Allergies    No Known Allergies    Intolerances      Vital Signs Last 24 Hrs  T(C): 36.4 (21 Aug 2022 16:19), Max: 37.1 (21 Aug 2022 04:49)  T(F): 97.5 (21 Aug 2022 16:19), Max: 98.7 (21 Aug 2022 04:49)  HR: 73 (21 Aug 2022 16:19) (73 - 84)  BP: 94/58 (21 Aug 2022 16:19) (94/58 - 100/70)  BP(mean): 80 (21 Aug 2022 12:45) (66 - 80)  RR: 16 (21 Aug 2022 16:19) (16 - 16)  SpO2: 73% (21 Aug 2022 16:19) (73% - 96%)    Parameters below as of 21 Aug 2022 12:45  Patient On (Oxygen Delivery Method): room air        08-20 @ 07:01 - 08-21 @ 07:00  --------------------------------------------------------  IN: 1640 mL / OUT: 700 mL / NET: 940 mL    08-21 @ 07:01 - 08-21 @ 17:00  --------------------------------------------------------  IN: 0 mL / OUT: 350 mL / NET: -350 mL          MEDICATIONS:  MEDICATIONS  (STANDING):  albuterol/ipratropium for Nebulization 3 milliLiter(s) Nebulizer every 6 hours  apixaban 2.5 milliGRAM(s) Oral every 12 hours  atorvastatin 10 milliGRAM(s) Oral at bedtime  finasteride 5 milliGRAM(s) Oral every 24 hours  lactated ringers. 1000 milliLiter(s) (75 mL/Hr) IV Continuous <Continuous>  piperacillin/tazobactam IVPB.. 2.25 Gram(s) IV Intermittent every 8 hours  polyethylene glycol 3350 17 Gram(s) Oral daily  senna 2 Tablet(s) Oral at bedtime  sertraline 50 milliGRAM(s) Oral every 24 hours  tamsulosin 0.4 milliGRAM(s) Oral at bedtime    MEDICATIONS  (PRN):  acetaminophen     Tablet .. 650 milliGRAM(s) Oral every 6 hours PRN Mild Pain (1 - 3)      PHYSICAL EXAM:  Un Comfortable, no distress  Eyes: PERRL, EOM intact; conjunctiva and sclera clear  Head: Normocephalic;  No Trauma  ENMT: No nasal discharge, hoarseness, +cough no hemoptysis  Neck: Supple; non tender; no masses or deformities.    No JVD  Respiratory:   - WHEEZING   + RHONCHI  - RALES  + CRACKLES.  Diminished breath sounds  BILATERAL  RIGHT  LEFT bases   Cardiovascular: Regular rate and rhythm. S1 and S2 Normal; No murmurs, gallops or rubs     - PPM/AICD  Gastrointestinal: Soft non-tender, non-distended; Normal bowel sounds; No hepatosplenomegaly.     -PEG    -  GT   - MOMIN  Genitourinary: No costovertebral angle tenderness. No dysuria  Extremities: AROM, No clubbing, cyanosis or edema    Vascular: Peripheral pulses palpable 2+ bilaterally  Neurological: Alert and responisve to stimuli   Skin: Warm and dry. No obvious rash  Lymph Nodes: No acute cervical or supraclavicular adenopathy  Psychiatric:m not really engaging    DEVICES:  - DENTURES   +IV R / L     - ETUBE   -TRACH   -CTUBE  R / L    LABS:                          7.6    7.24  )-----------( 245      ( 21 Aug 2022 10:28 )             22.5     08-21    137  |  103  |  98<H>  ----------------------------<  91  3.5   |  21<L>  |  3.85<H>    Ca    8.8      21 Aug 2022 10:28  Phos  4.9     08-21  Mg     2.3     08-21    TPro  4.7<L>  /  Alb  2.2<L>  /  TBili  0.6  /  DBili  x   /  AST  70<H>  /  ALT  29  /  AlkPhos  34<L>  08-20      RADIOLOGY & ADDITIONAL STUDIES (The following images were personally reviewed):

## 2022-08-21 NOTE — PROGRESS NOTE ADULT - PROBLEM SELECTOR PLAN 1
PMH, BPH, CKD. Presented with SADIQ on admission, BUN/Scr 98/4.26 (baseline Scr 2), recent UTI (patient was being treated with Levaquin in NH for reported UTI for 7 days starting 8.13 UCx from 8/3 with kleb pneumo. Resolved metabolic acidosis likely secondary to elevated BUN. Urology unable to place bedside Epstein, recommended cystoscopy assisted Epstein. Pt continues to decline. Resolved metabolic acidosis likely secondary to elevated BUN   - Likely secondary to prerenal (poor PO intake) vs ATN   - FENa: 1.0% prerenal, Francine<20  - renal U/S demonstrating intraparenchymal renal disease  - s/p  sodium bicarb 150meQ x2  - urinary output via condom cath last 24 hours through 8/19 5pm: 850cc    Plan:  - bladder scan q6  - trend Cr, avoid nephrotoxic drugs, renally dose meds  - F/u UA and Ucx  - renal following PMH, BPH, CKD. Presented with SADIQ on admission, BUN/Scr 98/4.26 (baseline Scr 2), recent UTI (patient was being treated with Levaquin in NH for reported UTI for 7 days starting 8.13 UCx from 8/3 with kleb pneumo. Resolved metabolic acidosis likely secondary to elevated BUN. Urology unable to place bedside Epstein, recommended cystoscopy assisted Epstein. Pt continues to decline. Resolved metabolic acidosis likely secondary to elevated BUN   - Likely secondary to prerenal (poor PO intake) vs ATN   - FENa: 1.0% prerenal, Francine<20  - renal U/S demonstrating intraparenchymal renal disease  - s/p  sodium bicarb 150meQ x1   - urinary output via condom cath last 24 hours through 8/19 5pm: 850cc    Plan:  - LR 1000 75cc/hr, replete cautiously with strict I&O. Stop fluids if PVR>500  - bladder scan q6  - trend Cr, avoid nephrotoxic drugs, renally dose meds  - F/u UA and Ucx  - renal following PMH, BPH, CKD. Presented with SADIQ on admission, BUN/Scr 98/4.26 (baseline Scr 2), recent UTI (patient was being treated with Levaquin in NH for reported UTI for 7 days starting 8.13 UCx from 8/3 with kleb pneumo. Resolved metabolic acidosis likely secondary to elevated BUN. Urology unable to place bedside Epstein, recommended cystoscopy assisted Epstein. Pt continues to decline. Resolved metabolic acidosis likely secondary to elevated BUN   - Likely secondary to prerenal (poor PO intake) vs ATN   - FENa: 1.0% prerenal, Francine<20  - renal U/S demonstrating intraparenchymal renal disease  - s/p  sodium bicarb 150meQ x1   - urinary output last 24H: 700    Plan:  - LR 1000 75cc/hr, replete cautiously with strict I&O. Stop fluids if PVR>500  - bladder scan q6  - trend Cr, avoid nephrotoxic drugs, renally dose meds  - F/u UA and Ucx  - renal following

## 2022-08-21 NOTE — PROGRESS NOTE ADULT - PROBLEM SELECTOR PLAN 6
Pressure sacral wound    Plan:  - maintain pressure dressing RESOLVED. Patient A&Ox1 on admission, unknown baseline, unclear if there is underlying dementia of if this is an acute process.   Likely 2/2 to uremia. Patient now AAOx3    - continue to monitor          # Elevated troponin   PMH constrictive CM  Patient with troponin 0.22 on admission which trended down to 0.2, likely elevated in setting of SADIQ and CKD.   - troponin downtrending 0.2-> 0.18

## 2022-08-21 NOTE — PROGRESS NOTE ADULT - PROBLEM SELECTOR PLAN 1
PMH, BPH, CKD. Presented with SADIQ on admission, BUN/Scr 98/4.26 (baseline Scr 2), recent UTI (patient was being treated with Levaquin in NH for reported UTI for 7 days starting 8.13 UCx from 8/3 with kleb pneumo. Resolved metabolic acidosis likely secondary to elevated BUN. Urology unable to place bedside Epstein, recommended cystoscopy assisted Epstein. Pt continues to decline. Resolved metabolic acidosis likely secondary to elevated BUN   - Likely secondary to prerenal (poor PO intake) vs ATN   - FENa: 1.0% prerenal, Francine<20  - renal U/S demonstrating intraparenchymal renal disease  - s/p  sodium bicarb 150meQ x1   - urinary output last 24H: 700    Plan:  - LR 1000 75cc/hr, replete cautiously with strict I&O. Stop fluids if PVR>500  - bladder scan q6  - trend Cr, avoid nephrotoxic drugs, renally dose meds  - F/u UA and Ucx  - renal following

## 2022-08-21 NOTE — PROGRESS NOTE ADULT - PROBLEM SELECTOR PLAN 7
Patient with history of BPH complicated by urinary retention, home medications Finasteride 5mg qd, and Tamsulosin 0.4mg qd.   - Continue home medications Pressure sacral wound    Plan:  - maintain pressure dressing

## 2022-08-21 NOTE — PROGRESS NOTE ADULT - PROBLEM SELECTOR PLAN 4
RESOLVED. Patient with nausea and vomiting for two days, possibly in setting of uremia or UTI.  Other differential include viral gastroenteritis, no evidence of colitis or most severe GI infection on imaging and no reported diarrhea.  Cardiac etiology unlikely, patient without symptoms, no ischemia on EKG, trop likely up in setting of renal failure now peaked.  - CTH: no acute pathology     Plan:  - Continue Zofran PRN vomiting Patient with recent cough, sob, hypoxia at [facility], [facility] with evidence of aspiration pneumonitis, likely in setting of vomiting.  Patient currently continues to cough but no longer hypoxic. No infiltrates or consolidations on imaging.     Plan:  - S&S cleared for minced and moist with thin liquids  - Maintain aspiration precautions

## 2022-08-21 NOTE — CHART NOTE - NSCHARTNOTEFT_GEN_A_CORE
PT was seen at bedside, . Pt was once again recommended to get a cystoscopy guided Epstein but declined despite being informed of the risks of urinary retention and its subsequent effect on his acute kidney injury.

## 2022-08-21 NOTE — PROGRESS NOTE ADULT - ASSESSMENT
89 y/o male PMH TIA, restrictive CM, CHF, HTN, HLD, Afib (on Eliquis), CKD (baseline Scr 2.0), BPH, urinary retention, limited mobility, brought from [facility] for oconcern of vomiting, hypoxia, sob, hypotension likely due to aspiration pna. Nephrology consulted for SADIQ on CKD.    Assessment: Came in with SADIQ on CKD (baseline Cr reportedly ~2). Pt had multiple reasons to have SADIQ including poor PO intake, hypotension and obstruction 2/2 BPH induced urinary retention. Given that obstructive component is chronic (BPH), SADIQ is more likely perfusion related to poor PO intake and hypotension. FeNa suggested prerenal component but pt also has retention shown on bladder scans--pt does not want cystoscopy guided rosenbaum however. Received fluids yesterday and Cr improved which supports prerenal etiology. Also may have ATN after having hypotension 2/2 sepsis. Appears dry on exam.    Recommendations:   #iATN in setting of hypotension  Likely has a component of iATN and pre-renal as responsive to volume but creatinine still significantly off from baseline  - Now s/p bicarb gtt on 8/20, would dc and start LR @ 75cc/hr  - Encourage PO intake  - pt does not want cystoscopy guided rosenbaum at this time, so any obstructive component of SADIQ may not resolve itself  - maintain MAP >70 for adequate renal perfusion  - avoid NSAIDS, PPIs and other nephrotoxic agents

## 2022-08-21 NOTE — PROGRESS NOTE ADULT - SUBJECTIVE AND OBJECTIVE BOX
SUBJECTIVE / OVERNIGHT EVENTS:  O/N events: No acute events overnight. PVR 06:00: 348  , irritable since he didn't sleep well. , patient still adamantly refusing Epstein despite understanding risks of not getting one. Reports improvement in SOB. Denies cp, fever, chills, n/v.  Otherwise negative ROS    MEDICATIONS  (STANDING):  albuterol/ipratropium for Nebulization 3 milliLiter(s) Nebulizer every 6 hours  apixaban 2.5 milliGRAM(s) Oral every 12 hours  atorvastatin 10 milliGRAM(s) Oral at bedtime  finasteride 5 milliGRAM(s) Oral every 24 hours  piperacillin/tazobactam IVPB.. 2.25 Gram(s) IV Intermittent every 8 hours  polyethylene glycol 3350 17 Gram(s) Oral daily  potassium chloride   Powder 40 milliEquivalent(s) Oral once  senna 2 Tablet(s) Oral at bedtime  sertraline 50 milliGRAM(s) Oral every 24 hours  tamsulosin 0.4 milliGRAM(s) Oral at bedtime    MEDICATIONS  (PRN):  acetaminophen     Tablet .. 650 milliGRAM(s) Oral every 6 hours PRN Mild Pain (1 - 3)    CAPILLARY BLOOD GLUCOSE            OBJECTIVE:  Vital Signs Last 24 Hrs  T(C): 37.1 (21 Aug 2022 04:49), Max: 37.1 (21 Aug 2022 04:49)  T(F): 98.7 (21 Aug 2022 04:49), Max: 98.7 (21 Aug 2022 04:49)  HR: 79 (21 Aug 2022 04:49) (79 - 94)  BP: 96/75 (21 Aug 2022 04:49) (96/75 - 99/62)  BP(mean): 66 (20 Aug 2022 21:10) (66 - 66)  RR: 16 (21 Aug 2022 04:49) (16 - 17)  SpO2: 96% (21 Aug 2022 04:49) (96% - 99%)    Parameters below as of 21 Aug 2022 04:49  Patient On (Oxygen Delivery Method): room air        PHYSICAL EXAM:  General: AAOx3, NAD  Head: NC/AT; MMM; PERRL; EOMI;  Neck: Supple; no JVD  Respiratory: CTAB; no rales/rhonchi  Cardiovascular: Regular rhythm/rate; S1/S2+, no murmurs, rubs gallops   Gastrointestinal: Soft; NTND; bowel sounds normal and present  Extremities: WWP; no edema/cyanosis, feet wrapped in soft boots   Neurological: CNII-XII grossly intact; no obvious focal deficits  Skin: Clean and intact. Good skin turgor. saccral wound, R heel wound covered with pressure dressings  I&O's Summary    20 Aug 2022 07:01  -  21 Aug 2022 07:00  --------------------------------------------------------  IN: 1640 mL / OUT: 700 mL / NET: 940 mL        LABS:                        7.6    7.24  )-----------( 245      ( 21 Aug 2022 10:28 )             22.5     08-21    137  |  103  |  98<H>  ----------------------------<  91  3.5   |  21<L>  |  3.85<H>    Ca    8.8      21 Aug 2022 10:28  Phos  4.9     08-21  Mg     2.3     08-21    TPro  4.7<L>  /  Alb  2.2<L>  /  TBili  0.6  /  DBili  x   /  AST  70<H>  /  ALT  29  /  AlkPhos  34<L>  08-20                  RADIOLOGY & ADDITIONAL TESTS:  Imaging from Last 24 Hours:

## 2022-08-21 NOTE — PROGRESS NOTE ADULT - PROBLEM SELECTOR PLAN 3
Patient meeting two SIRS critera HR 93 WBC 13.88 on admission possible sources aspiration pneumonitis and UTI.  Patient has UCx positive for the kleb pnuemo on 8/3 and was being treated with Levaquin since 8/13.  Abdominal source unlikely patient without diarrhea, no evidence of infection on CTAP.  Patient remains afebrile, hemodynamically stable   - improving leukocytosis, procal 1.94    - c/w zosyn 2.25 q8 (8/18-8/25) adjusted for age   - Bcx NTD  - F/u UA/ Ucx unable to straight cath   - c/w vest PT BID

## 2022-08-21 NOTE — PROGRESS NOTE ADULT - PROBLEM SELECTOR PLAN 2
Patient meeting two SIRS critera HR 93 WBC 13.88 on admission possible sources aspiration pneumonitis and UTI.  Patient has UCx positive for the kleb pnuemo on 8/3 and was being treated with Levaquin since 8/13.  Abdominal source unlikely patient without diarrhea, no evidence of infection on CTAP.  Patient remains afebrile, hemodynamically stable   - improving leukocytosis, procal 1.94    - c/w zosyn 2.25 q8 (8/18-8/25) adjusted for age   - Bcx NTD x 2 days  - F/u UA/ Ucx unable to straight cath   - c/w vest PT BID Patient meeting two SIRS critera HR 93 WBC 13.88 on admission possible sources aspiration pneumonitis and UTI.  Patient has UCx positive for the kleb pnuemo on 8/3 and was being treated with Levaquin since 8/13.  Abdominal source unlikely patient without diarrhea, no evidence of infection on CTAP.  Patient remains afebrile, hemodynamically stable   - improving leukocytosis, procal 1.94    - c/w zosyn 2.25 q8 (8/18-8/25) adjusted for age   - Bcx NTD  - F/u UA/ Ucx unable to straight cath   - c/w vest PT BID Patient with CHF, no previous echo, BNP on admission 2189, unclear if ischemic or nonischemic etiology but patient is documented to have restrictive cardiomyopathy  Home medications Coreg 3.125mg BID, Enalapril 5mg qd, Lasix 20mg qd, Hydralazine 50mg TID.  Patient clinically euvolemic on exam.  TTE: LVEF 45-50% with global hypokinesis. Abnormal septal motion seen due to a left bundle branch block. Mild MR, mild AR, trivial PCD effusion  - Euvolemic on exam, - JVD, - b/l L/E edema, current b/l base crackles likely secondary to asp PNA    Plan:   - strict I/Os, monitor UOP carefully

## 2022-08-21 NOTE — PROGRESS NOTE ADULT - PROBLEM SELECTOR PLAN 8
Hgb on admission 9.4 (baseline 8) normocytic, likely GM vs AOCD in setting of renal failure. Currently no signs of active bleeding (no hematochezia, melena, hemoptysis, hematuria)  - f/u iron panel  - trend CBC  - maintain active T&S (8/19)  - transfuse if Hgb <7 Hgb on admission 9.4 (baseline 8) normocytic, likely GM vs AOCD in setting of renal failure. Currently no signs of active bleeding (no hematochezia, melena, hemoptysis, hematuria)  - Anemia of chronic disease: normal Fe, high ferritin     - trend CBC  - maintain active T&S (8/19)  - transfuse if Hgb <7 Patient with history of BPH complicated by urinary retention, home medications Finasteride 5mg qd, and Tamsulosin 0.4mg qd.   - Continue home medications

## 2022-08-21 NOTE — PROGRESS NOTE ADULT - PROBLEM SELECTOR PLAN 3
Patient with recent cough, sob, hypoxia at [facility], [facility] with evidence of aspiration pneumonitis, likely in setting of vomiting.  Patient currently continues to cough but no longer hypoxic. No infiltrates or consolidations on imaging.     Plan:  - S&S cleared for minced and moist with thin liquids  - Maintained for aspiration precautions Patient with recent cough, sob, hypoxia at [facility], [facility] with evidence of aspiration pneumonitis, likely in setting of vomiting.  Patient currently continues to cough but no longer hypoxic. No infiltrates or consolidations on imaging.     Plan:  - S&S cleared for minced and moist with thin liquids  - Maintain aspiration precautions Patient meeting two SIRS critera HR 93 WBC 13.88 on admission possible sources aspiration pneumonitis and UTI.  Patient has UCx positive for the kleb pnuemo on 8/3 and was being treated with Levaquin since 8/13.  Abdominal source unlikely patient without diarrhea, no evidence of infection on CTAP.  Patient remains afebrile, hemodynamically stable   - improving leukocytosis, procal 1.94    - c/w zosyn 2.25 q8 (8/18-8/25) adjusted for age   - Bcx NTD  - F/u UA/ Ucx unable to straight cath   - c/w vest PT BID

## 2022-08-22 PROBLEM — I48.20 CHRONIC ATRIAL FIBRILLATION, UNSPECIFIED: Chronic | Status: ACTIVE | Noted: 2022-01-01

## 2022-08-22 PROBLEM — I50.9 HEART FAILURE, UNSPECIFIED: Chronic | Status: ACTIVE | Noted: 2022-01-01

## 2022-08-22 PROBLEM — N18.9 CHRONIC KIDNEY DISEASE, UNSPECIFIED: Chronic | Status: ACTIVE | Noted: 2022-01-01

## 2022-08-22 PROBLEM — E78.5 HYPERLIPIDEMIA, UNSPECIFIED: Chronic | Status: ACTIVE | Noted: 2022-01-01

## 2022-08-22 PROBLEM — I10 ESSENTIAL (PRIMARY) HYPERTENSION: Chronic | Status: ACTIVE | Noted: 2022-01-01

## 2022-08-22 PROBLEM — N40.0 BENIGN PROSTATIC HYPERPLASIA WITHOUT LOWER URINARY TRACT SYMPTOMS: Chronic | Status: ACTIVE | Noted: 2022-01-01

## 2022-08-22 NOTE — PROGRESS NOTE ADULT - PROBLEM SELECTOR PLAN 1
PMH, BPH, CKD. Presented with SADIQ on admission, BUN/Scr 98/4.26 (baseline Scr 2), recent UTI (patient was being treated with Levaquin in [facility] for reported UTI for 7 days starting 8.13 UCx from 8/3 with kleb pneumo. Resolved metabolic acidosis likely secondary to elevated BUN. Urology unable to place bedside Epstein, recommended cystoscopy assisted Epstein. Pt continues to decline. Resolved metabolic acidosis likely secondary to elevated BUN   - Likely secondary to prerenal (poor PO intake) vs ATN   - FENa: 1.0% prerenal, Francine<20  - renal U/S demonstrating intraparenchymal renal disease  - s/p  sodium bicarb 150meQ x1   - urinary output last 24H: 700    Plan:  - LR 1000 70cc/hr, replete cautiously with strict I&O. Stop fluids if PVR>500  - bladder scan q6  - trend Cr, avoid nephrotoxic drugs, renally dose meds  - renal following

## 2022-08-22 NOTE — PROGRESS NOTE ADULT - SUBJECTIVE AND OBJECTIVE BOX
INTERVAL EVENTS: BRENDA    SUBJECTIVE / INTERVAL HPI: Patient seen and examined at bedside. Reports suprapubic discomfort. Reports still does not want rosenbaum.     ROS: negative unless otherwise stated above.    VITAL SIGNS:  Vital Signs Last 24 Hrs  T(C): 36.7 (22 Aug 2022 09:44), Max: 36.7 (22 Aug 2022 09:44)  T(F): 98.1 (22 Aug 2022 09:44), Max: 98.1 (22 Aug 2022 09:44)  HR: 72 (22 Aug 2022 09:44) (64 - 73)  BP: 107/66 (22 Aug 2022 09:44) (94/58 - 114/61)  BP(mean): 80 (21 Aug 2022 12:45) (80 - 80)  RR: 16 (22 Aug 2022 09:44) (16 - 19)  SpO2: 97% (22 Aug 2022 09:44) (73% - 97%)    Parameters below as of 22 Aug 2022 09:44  Patient On (Oxygen Delivery Method): room air          08-21-22 @ 07:01  -  08-22-22 @ 07:00  --------------------------------------------------------  IN: 0 mL / OUT: 700 mL / NET: -700 mL    08-22-22 @ 07:01  -  08-22-22 @ 11:23  --------------------------------------------------------  IN: 0 mL / OUT: 200 mL / NET: -200 mL        PHYSICAL EXAM:    GENERAL: laying in bed, NAD  HEENT: mildly dry MM. neck supple, no JVP  CHEST/LUNG: speaking in full sentences, no accessory muscle use  HEART: Regular rate and rhythm, no murmur, no gallops, no rub   ABDOMEN: Soft, nontender, mildly distended  : Suprapubic tenderness  EXTREMITIES: no pedal edema, b/l off loading boots  Psych: calm, appropriate    MEDICATIONS:  MEDICATIONS  (STANDING):  albuterol/ipratropium for Nebulization 3 milliLiter(s) Nebulizer every 6 hours  apixaban 2.5 milliGRAM(s) Oral every 12 hours  atorvastatin 10 milliGRAM(s) Oral at bedtime  finasteride 5 milliGRAM(s) Oral every 24 hours  lactated ringers. 1000 milliLiter(s) (75 mL/Hr) IV Continuous <Continuous>  lactated ringers. 1000 milliLiter(s) (70 mL/Hr) IV Continuous <Continuous>  piperacillin/tazobactam IVPB.. 2.25 Gram(s) IV Intermittent every 8 hours  polyethylene glycol 3350 17 Gram(s) Oral daily  senna 2 Tablet(s) Oral at bedtime  sertraline 50 milliGRAM(s) Oral every 24 hours  tamsulosin 0.4 milliGRAM(s) Oral at bedtime    MEDICATIONS  (PRN):  acetaminophen     Tablet .. 650 milliGRAM(s) Oral every 6 hours PRN Mild Pain (1 - 3)      ALLERGIES:  Allergies    No Known Allergies    Intolerances        LABS:                        7.3    6.86  )-----------( 209      ( 22 Aug 2022 05:30 )             21.8     08-22    139  |  104  |  84<H>  ----------------------------<  92  3.9   |  22  |  3.73<H>    Ca    8.7      22 Aug 2022 05:30  Phos  3.9     08-22  Mg     2.2     08-22    TPro  4.6<L>  /  Alb  2.1<L>  /  TBili  0.6  /  DBili  x   /  AST  60<H>  /  ALT  27  /  AlkPhos  30<L>  08-22        CAPILLARY BLOOD GLUCOSE          RADIOLOGY & ADDITIONAL TESTS: Reviewed.

## 2022-08-22 NOTE — PROGRESS NOTE ADULT - ASSESSMENT
89 y/o male PMH TIA, restrictive CM, CHF, HTN, HLD, Afib (on Eliquis), CKD (baseline Scr 2.0), BPH, urinary retention, limited mobility, brought from [facility] for oconcern of vomiting, hypoxia, sob, hypotension likely due to aspiration pneumonia and SADIQ on CKD.       monitor PO intake  acceptable   stable  -t IV zosyn completed  refuses care at times  : rosenbaum refused

## 2022-08-22 NOTE — PROGRESS NOTE ADULT - PROBLEM SELECTOR PLAN 4
Patient with recent cough, sob, hypoxia at [facility], [facility] with evidence of aspiration pneumonitis, likely in setting of vomiting.  Patient currently continues to cough but no longer hypoxic. No infiltrates or consolidations on imaging.     Plan:  - S&S cleared for minced and moist with thin liquids  - Maintain aspiration precautions

## 2022-08-22 NOTE — PROGRESS NOTE ADULT - SUBJECTIVE AND OBJECTIVE BOX
SUBJECTIVE / OVERNIGHT EVENTS:  O/N events: No acute events O/N. PVR 6:00 362   resting comfortably. Reports improvement in breathing and appetite. Denies fever, chills, CP, abdominal pain, suprapubic tenderness.  MEDICATIONS  (STANDING):  albuterol/ipratropium for Nebulization 3 milliLiter(s) Nebulizer every 6 hours  apixaban 2.5 milliGRAM(s) Oral every 12 hours  atorvastatin 10 milliGRAM(s) Oral at bedtime  finasteride 5 milliGRAM(s) Oral every 24 hours  lactated ringers. 1000 milliLiter(s) (75 mL/Hr) IV Continuous <Continuous>  piperacillin/tazobactam IVPB.. 2.25 Gram(s) IV Intermittent every 8 hours  polyethylene glycol 3350 17 Gram(s) Oral daily  senna 2 Tablet(s) Oral at bedtime  sertraline 50 milliGRAM(s) Oral every 24 hours  tamsulosin 0.4 milliGRAM(s) Oral at bedtime    MEDICATIONS  (PRN):  acetaminophen     Tablet .. 650 milliGRAM(s) Oral every 6 hours PRN Mild Pain (1 - 3)    CAPILLARY BLOOD GLUCOSE            OBJECTIVE:  Vital Signs Last 24 Hrs  T(C): 36.6 (22 Aug 2022 06:33), Max: 36.6 (21 Aug 2022 20:58)  T(F): 97.9 (22 Aug 2022 06:33), Max: 97.9 (21 Aug 2022 20:58)  HR: 64 (22 Aug 2022 06:33) (64 - 73)  BP: 107/61 (22 Aug 2022 06:33) (94/58 - 114/61)  BP(mean): 80 (21 Aug 2022 12:45) (80 - 80)  RR: 19 (22 Aug 2022 06:33) (16 - 19)  SpO2: 96% (22 Aug 2022 06:33) (73% - 97%)    Parameters below as of 22 Aug 2022 06:33  Patient On (Oxygen Delivery Method): room air        PHYSICAL EXAM:  General: AAOx3, NAD  Head: NC/AT; MMM; PERRL; EOMI;  Neck: Supple; no JVD  Respiratory: CTAB; no wheezes/rales/rhonchi  Cardiovascular: Regular rhythm/rate; S1/S2+, no murmurs, rubs gallops   Gastrointestinal: Soft; NT; bowel sounds normal and present  Extremities: WWP; no edema/cyanosis  Neurological: CNII-XII grossly intact; no obvious focal deficits  Skin: Clean and intact. Poor skin turgor. Saccral wound and R heel ulcer with pressure dressings   I&O's Summary    21 Aug 2022 07:01  -  22 Aug 2022 07:00  --------------------------------------------------------  IN: 0 mL / OUT: 700 mL / NET: -700 mL    22 Aug 2022 07:01  -  22 Aug 2022 08:41  --------------------------------------------------------  IN: 0 mL / OUT: 200 mL / NET: -200 mL        LABS:                        7.6    7.24  )-----------( 245      ( 21 Aug 2022 10:28 )             22.5     08-21    137  |  103  |  98<H>  ----------------------------<  91  3.5   |  21<L>  |  3.85<H>    Ca    8.8      21 Aug 2022 10:28  Phos  4.9     08-21  Mg     2.3     08-21                    RADIOLOGY & ADDITIONAL TESTS:  Imaging from Last 24 Hours:

## 2022-08-22 NOTE — CHART NOTE - NSCHARTNOTEFT_GEN_A_CORE
Admitting Diagnosis:   Patient is a 89y old  Male who presents with a chief complaint of sepsis (21 Aug 2022 20:42)    PAST MEDICAL & SURGICAL HISTORY:  Chronic CHF  Hypertension  Hyperlipidemia  Chronic kidney disease (CKD)  BPH (benign prostatic hyperplasia)  Chronic atrial fibrillation    Current Nutrition Order: Minced and Moist diet (ordered and in place since 08/18/22)  PO Intake: Good (%) [   ]  Fair (50-75%) [ x ] Poor (<25%) [   ]  GI Issues: within normal limits at this time per flowsheets data; pt endorsed no N/V, per RN no acute GI distress noted;  Pain: denies pain/discomfort as per flowsheets data  Skin Integrity: Curly: 12; no edema noted; pressure injuries: stage III sacral PI, stage III R ankle PI    Labs:   08-22  139  |  104  |  84<H>  ----------------------------<  92  3.9   |  22  |  3.73<H>    Ca    8.7      22 Aug 2022 05:30  Phos  3.9     08-22  Mg     2.2     08-22    TPro  4.6<L>  /  Alb  2.1<L>  /  TBili  0.6  /  DBili  x   /  AST  60<H>  /  ALT  27  /  AlkPhos  30<L>  08-22    CAPILLARY BLOOD GLUCOSE    Medications:  MEDICATIONS  (STANDING):  albuterol/ipratropium for Nebulization 3 milliLiter(s) Nebulizer every 6 hours  apixaban 2.5 milliGRAM(s) Oral every 12 hours  atorvastatin 10 milliGRAM(s) Oral at bedtime  finasteride 5 milliGRAM(s) Oral every 24 hours  lactated ringers. 1000 milliLiter(s) (75 mL/Hr) IV Continuous <Continuous>  piperacillin/tazobactam IVPB.. 2.25 Gram(s) IV Intermittent every 8 hours  polyethylene glycol 3350 17 Gram(s) Oral daily  senna 2 Tablet(s) Oral at bedtime  sertraline 50 milliGRAM(s) Oral every 24 hours  tamsulosin 0.4 milliGRAM(s) Oral at bedtime    MEDICATIONS  (PRN):  acetaminophen     Tablet .. 650 milliGRAM(s) Oral every 6 hours PRN Mild Pain (1 - 3)    Anthropometrics:   Height for BMI (FEET)	5 Feet  Height for BMI (INCHES)	11 Inch(s)  Height for BMI (CM)             180.34 Centimeter(s)  Weight for BMI (lbs)	149.9 lb  Weight for BMI (kg)	68 kg  Body Mass Index	              20.9     Weight Change: N/A    Estimated energy needs:   Weight used for calculations	current weight   Estimated Energy Needs Weight (lbs)	149.9 lb  Estimated Energy Needs Weight (kg)	68 kg  Estimated Energy Needs From (roxy/kg)30   Estimated Energy Needs To (roxy/kg)	35   Estimated Energy Needs Calculated From (roxy/kg)2040   Estimated Energy Needs Calculated To (roxy/kg)	2380   Weight used for calculations	current weight   Estimated Protein Needs Weight (lbs)	149.9 lb  Estimated Protein Needs Weight (kg)	68 kg  Estimated Protein Needs From (g/kg)	1   Estimated Protein Needs To (g/kg)	1.5   Estimated Protein Needs Calculated From (g/kg) 68   Estimated Protein Needs Calculated To (g/kg)	102   Other Calculations	Based on Standards of Care pt within % IBW (IBW is 172#) thus actual body weight used for all calculations. Needs adjusted for advanced age, pressure injuries and protein was slightly (not fully) increased r/t CKD status. Fluid recs per team.     Subjective: 89 y/o male PMH TIA, restrictive CM, CHF, HTN, HLD, Afib (on Eliquis), CKD (baseline Scr 2.0), BPH, urinary retention, limited mobility, brought from [facility] for concern of vomiting, hypoxia, sob, hypotension. Patient not providing history, obtained from chart and ED provider.  Patient with several episodes of NBNB vomiting since yesterday wich subsequently onset of shortness of breath and cough which has been progressively working. At baseline patient does not use any supplemental oxygen but was requiring 4L NC and was hypotensive to 100/60. Per records from [facility] no recent fever, chills, rhinorrhea, chest pain, palpitations, headaches, abdominal pain, diarrhea, constipation, dysuria, urgency, frequency. 08/18/22: cleared for minced and moist diet; renal consult. 08/19/22: voided 850cc at 5pm, started 1L LR. PM BMP shows improved SADIQ but slightly worse acidosis. 08/20/22: reordered LR, cr is worsening; electrolytes suggest prerenal.        Previous Nutrition Diagnosis: Increased Nutrient Needs...   (calories, protein) r/t wound healing AEB stage III pressure ulcers to R ankle and sacrum areas     Active [ x ]  Resolved [   ]    If resolved, new PES:     Goal: Pt to consistently meet at least 75% of EEE via tolerated route that is consistent with GOC; pt will exhibit healthy wound healing    Recommendations:   Additional Recommendations	  1. Recommend Low sodium, No Concentrated Potassium, No Concentrated Phos, 60gm Protein diet   >>Consider Ensure High Protein Plus supplement BID (350kcal, 20gPRO per serving)   2. Encourage pt to meet nutritional needs as able   3. Monitor PO intakes, trend weights (weekly), monitor skin integrity, monitor labs (electrolytes, CMP), monitor GI fxn   4. Encourage adherence to diet education (reinforce as able)   5. Recommend Zinc 220mg/day and Vitamin C 500mg daily for wound healing   6. Pain and bowel regimen per team *monitor BMs and communicate with team*  7. Will continue to assess/honor preferences as able   8. Align nutrition interventions with GOC at all times           Education:     Risk Level: High [   ] Moderate [   ] Low [   ] Admitting Diagnosis:   Patient is a 89y old  Male who presents with a chief complaint of sepsis (21 Aug 2022 20:42)    PAST MEDICAL & SURGICAL HISTORY:  Chronic CHF  Hypertension  Hyperlipidemia  Chronic kidney disease (CKD)  BPH (benign prostatic hyperplasia)  Chronic atrial fibrillation    Current Nutrition Order: Minced and Moist diet (ordered and in place since 08/18/22)  PO Intake: Good (%) [   ]  Fair (50-75%) [ x ] Poor (<25%) [   ]  GI Issues: within normal limits at this time per flowsheets data; pt endorsed no N/V, per RN no acute GI distress noted;  Pain: denies pain/discomfort as per flowsheets data  Skin Integrity: Curly: 12; no edema noted; pressure injuries: stage III sacral PI, stage III R ankle PI    Labs:   08-22  139  |  104  |  84<H>  ----------------------------<  92  3.9   |  22  |  3.73<H>    Ca    8.7      22 Aug 2022 05:30  Phos  3.9     08-22  Mg     2.2     08-22    TPro  4.6<L>  /  Alb  2.1<L>  /  TBili  0.6  /  DBili  x   /  AST  60<H>  /  ALT  27  /  AlkPhos  30<L>  08-22    CAPILLARY BLOOD GLUCOSE    Medications:  MEDICATIONS  (STANDING):  albuterol/ipratropium for Nebulization 3 milliLiter(s) Nebulizer every 6 hours  apixaban 2.5 milliGRAM(s) Oral every 12 hours  atorvastatin 10 milliGRAM(s) Oral at bedtime  finasteride 5 milliGRAM(s) Oral every 24 hours  lactated ringers. 1000 milliLiter(s) (75 mL/Hr) IV Continuous <Continuous>  piperacillin/tazobactam IVPB.. 2.25 Gram(s) IV Intermittent every 8 hours  polyethylene glycol 3350 17 Gram(s) Oral daily  senna 2 Tablet(s) Oral at bedtime  sertraline 50 milliGRAM(s) Oral every 24 hours  tamsulosin 0.4 milliGRAM(s) Oral at bedtime    MEDICATIONS  (PRN):  acetaminophen     Tablet .. 650 milliGRAM(s) Oral every 6 hours PRN Mild Pain (1 - 3)    Anthropometrics:   Height for BMI (FEET)	5 Feet  Height for BMI (INCHES)	11 Inch(s)  Height for BMI (CM)             180.34 Centimeter(s)  Weight for BMI (lbs)	149.9 lb  Weight for BMI (kg)	68 kg  Body Mass Index	              20.9     Weight Change: N/A    Estimated energy needs:   Weight used for calculations	current weight   Estimated Energy Needs Weight (lbs)	149.9 lb  Estimated Energy Needs Weight (kg)	68 kg  Estimated Energy Needs From (roxy/kg)30   Estimated Energy Needs To (roxy/kg)	35   Estimated Energy Needs Calculated From (roxy/kg)2040   Estimated Energy Needs Calculated To (roxy/kg)	2380   Weight used for calculations	current weight   Estimated Protein Needs Weight (lbs)	149.9 lb  Estimated Protein Needs Weight (kg)	68 kg  Estimated Protein Needs From (g/kg)	1   Estimated Protein Needs To (g/kg)	1.5   Estimated Protein Needs Calculated From (g/kg) 68   Estimated Protein Needs Calculated To (g/kg)	102   Other Calculations	Based on Standards of Care pt within % IBW (IBW is 172#) thus actual body weight used for all calculations. Needs adjusted for advanced age, pressure injuries and protein was slightly (not fully) increased r/t CKD status. Fluid recs per team.     Subjective: 89 y/o male PMH TIA, restrictive CM, CHF, HTN, HLD, Afib (on Eliquis), CKD (baseline Scr 2.0), BPH, urinary retention, limited mobility, brought from [facility] for concern of vomiting, hypoxia, sob, hypotension. Patient not providing history, obtained from chart and ED provider.  Patient with several episodes of NBNB vomiting since yesterday wich subsequently onset of shortness of breath and cough which has been progressively working. At baseline patient does not use any supplemental oxygen but was requiring 4L NC and was hypotensive to 100/60. Per records from [facility] no recent fever, chills, rhinorrhea, chest pain, palpitations, headaches, abdominal pain, diarrhea, constipation, dysuria, urgency, frequency. 08/18/22: cleared for minced and moist diet; renal consult. 08/19/22: voided 850cc at 5pm, started 1L LR. PM BMP shows improved SADIQ but slightly worse acidosis. 08/20/22: reordered LR, cr is worsening; electrolytes suggest prerenal. BS >500, fluids were dc'd at 2pm. Sodium bicarb started at 100cc/h. o/n . 08/21/22: BM on 08/20 and this A.M. Cr improving. Bicarb drip stopped. K repleted. LR started 75cc/h per renal. o/n .    Pt seen on 4U at bedside. As per pt, appetite fair. Pt endorsed ~50% PO consumption on this date (RD observed pt eating breakfast, blank PO intakes overall. Pt endorses no s/s of N/V/D/C. Skin: blank. Denies pain/discomfort per flowsheets data. Upon observation, no/several s/s of malnutrition observed per RD visual inspection. No additional nutrition-related concerns. RD will remain available. Additional nutrition recommendations listed below to follow.     Previous Nutrition Diagnosis: Increased Nutrient Needs...   (calories, protein) r/t wound healing AEB stage III pressure ulcers to R ankle and sacrum areas     Active [ x ]  Resolved [   ]    If resolved, new PES:     Goal: Pt to consistently meet at least 75% of EEE via tolerated route that is consistent with GOC; pt will exhibit healthy wound healing    Recommendations:   Additional Recommendations	  1. Recommend Low sodium, No Concentrated Potassium, No Concentrated Phos, 60gm Protein diet   >>Consider Ensure High Protein Plus supplement BID (350kcal, 20gPRO per serving)   2. Encourage pt to meet nutritional needs as able   3. Monitor PO intakes, trend weights (weekly), monitor skin integrity, monitor labs (electrolytes, CMP), monitor GI fxn   4. Encourage adherence to diet education (reinforce as able)   5. Recommend Zinc 220mg/day and Vitamin C 500mg daily for wound healing   6. Pain and bowel regimen per team *monitor BMs and communicate with team*  7. Will continue to assess/honor preferences as able   8. Align nutrition interventions with GOC at all times           Education:     Risk Level: High [   ] Moderate [   ] Low [   ] Admitting Diagnosis:   Patient is a 89y old  Male who presents with a chief complaint of sepsis (21 Aug 2022 20:42)    PAST MEDICAL & SURGICAL HISTORY:  Chronic CHF  Hypertension  Hyperlipidemia  Chronic kidney disease (CKD)  BPH (benign prostatic hyperplasia)  Chronic atrial fibrillation    Current Nutrition Order: Minced and Moist diet (ordered and in place since 08/18/22)  PO Intake: Good (%) [   ]  Fair (50-75%) [ x ] Poor (<25%) [   ]  GI Issues: within normal limits at this time per flowsheets data; pt endorsed no N/V, per RN no acute GI distress noted; BM noted on 08/20 and 08/21 per chart data (documentation by team in EMR)  Pain: denies pain/discomfort as per flowsheets data  Skin Integrity: Curly: 12; no edema noted; pressure injuries: stage III sacral PI, stage III R ankle PI    Labs:   08-22  139  |  104  |  84<H>  ----------------------------<  92  3.9   |  22  |  3.73<H>    Ca    8.7      22 Aug 2022 05:30  Phos  3.9     08-22  Mg     2.2     08-22    TPro  4.6<L>  /  Alb  2.1<L>  /  TBili  0.6  /  DBili  x   /  AST  60<H>  /  ALT  27  /  AlkPhos  30<L>  08-22    CAPILLARY BLOOD GLUCOSE    Medications:  MEDICATIONS  (STANDING):  albuterol/ipratropium for Nebulization 3 milliLiter(s) Nebulizer every 6 hours  apixaban 2.5 milliGRAM(s) Oral every 12 hours  atorvastatin 10 milliGRAM(s) Oral at bedtime  finasteride 5 milliGRAM(s) Oral every 24 hours  lactated ringers. 1000 milliLiter(s) (75 mL/Hr) IV Continuous <Continuous>  piperacillin/tazobactam IVPB.. 2.25 Gram(s) IV Intermittent every 8 hours  polyethylene glycol 3350 17 Gram(s) Oral daily  senna 2 Tablet(s) Oral at bedtime  sertraline 50 milliGRAM(s) Oral every 24 hours  tamsulosin 0.4 milliGRAM(s) Oral at bedtime    MEDICATIONS  (PRN):  acetaminophen     Tablet .. 650 milliGRAM(s) Oral every 6 hours PRN Mild Pain (1 - 3)    Anthropometrics:   Height for BMI (FEET)	5 Feet  Height for BMI (INCHES)	11 Inch(s)  Height for BMI (CM)             180.34 Centimeter(s)  Weight for BMI (lbs)	149.9 lb  Weight for BMI (kg)	68 kg  Body Mass Index	              20.9     Weight Change: N/A    Estimated energy needs:   Weight used for calculations	current weight   Estimated Energy Needs Weight (lbs)	149.9 lb  Estimated Energy Needs Weight (kg)	68 kg  Estimated Energy Needs From (roxy/kg)30   Estimated Energy Needs To (roxy/kg)	35   Estimated Energy Needs Calculated From (roxy/kg)2040   Estimated Energy Needs Calculated To (roxy/kg)	2380   Weight used for calculations	current weight   Estimated Protein Needs Weight (lbs)	149.9 lb  Estimated Protein Needs Weight (kg)	68 kg  Estimated Protein Needs From (g/kg)	1   Estimated Protein Needs To (g/kg)	1.5   Estimated Protein Needs Calculated From (g/kg) 68   Estimated Protein Needs Calculated To (g/kg)	102   Other Calculations	Based on Standards of Care pt within % IBW (IBW is 172#) thus actual body weight used for all calculations. Needs adjusted for advanced age, pressure injuries and protein was slightly (not fully) increased r/t CKD status. Fluid recs per team.     Subjective: 89 y/o male PMH TIA, restrictive CM, CHF, HTN, HLD, Afib (on Eliquis), CKD (baseline Scr 2.0), BPH, urinary retention, limited mobility, brought from [facility] for concern of vomiting, hypoxia, sob, hypotension. Patient not providing history, obtained from chart and ED provider.  Patient with several episodes of NBNB vomiting since yesterday wich subsequently onset of shortness of breath and cough which has been progressively working. At baseline patient does not use any supplemental oxygen but was requiring 4L NC and was hypotensive to 100/60. Per records from [facility] no recent fever, chills, rhinorrhea, chest pain, palpitations, headaches, abdominal pain, diarrhea, constipation, dysuria, urgency, frequency. 08/18/22: cleared for minced and moist diet; renal consult. 08/19/22: voided 850cc at 5pm, started 1L LR. PM BMP shows improved SADIQ but slightly worse acidosis. 08/20/22: reordered LR, cr is worsening; electrolytes suggest prerenal. BS >500, fluids were dc'd at 2pm. Sodium bicarb started at 100cc/h. o/n . 08/21/22: BM on 08/20 and this A.M. Cr improving. Bicarb drip stopped. K repleted. LR started 75cc/h per renal. o/n .    Pt seen on 4U at bedside. Pt appeared confused, this was confirmed by RN; minimal information gathered by RD at time of interview. As per pt, appetite fair. Pt endorsed ~50% PO consumption on this date (RD observed pt eating breakfast, blank PO intakes overall. Pt endorses no s/s of N/V/D/C. BM noted on 08/20 and 08/21 per team documentation. Skin Integrity: Curly: 12; no edema noted; pressure injuries still apparent per RN: stage III sacral PI, stage III R ankle PI. Denies pain/discomfort per flowsheets data. I/O: 3140/3500 (-360). Upon observation, no s/s of malnutrition observed per RD visual inspection. No additional nutrition-related concerns. RD will remain available. Additional nutrition recommendations listed below to follow.     Previous Nutrition Diagnosis: Increased Nutrient Needs (calories, protein) r/t wound healing AEB stage III pressure ulcers to R ankle and sacrum areas   Active [ x ]  Resolved [   ]  If resolved, new PES:     Goal: Pt to consistently meet at least 75% of EEE via tolerated route that is consistent with GOC; pt will exhibit healthy wound healing    Recommendations:   Additional Recommendations	  1. Recommend Low sodium, No Concentrated Potassium, No Concentrated Phos, 60gm Protein diet   >>Ensure High Protein Plus supplement BID (350kcal, 20gPRO per serving)   2. Encourage pt to meet nutritional needs as able   3. Monitor PO intakes, trend weights (weekly), monitor skin integrity, monitor labs (electrolytes, CMP), monitor GI fxn   4. Encourage adherence to diet education (reinforce as able)   5. Recommend Zinc 220mg/day and Vitamin C 500mg daily for wound healing   6. Pain and bowel regimen per team  7. Will continue to assess/honor preferences as able   8. Align nutrition interventions with GOC at all times     Education: Brief encouragement by RD for pt to increase PO intakes; pt verbalized some understanding. RD to f/u with RN and team.     Risk Level: High [   ] Moderate [ x ] Low [   ]

## 2022-08-22 NOTE — PROGRESS NOTE ADULT - ASSESSMENT
89 y/o male PMH TIA, restrictive CM, CHF, HTN, HLD, Afib (on Eliquis), CKD (baseline Scr 2.0), BPH, urinary retention, limited mobility, brought from [facility] for oconcern of vomiting, hypoxia, sob, hypotension likely due to aspiration pneumonia and SADIQ on CKD.   Nephrology consulted for SADIQ.    Assessment: Came in with SADIQ on CKD (baseline Cr reportedly ~2). Pt had multiple reasons to have SADIQ including poor PO intake, hypotension and obstruction 2/2 BPH induced urinary retention. Given that obstructive component is chronic (BPH), SADIQ is more likely perfusion related to poor PO intake and hypotension. FeNa suggested prerenal component but pt also has retention shown on bladder scans--pt does not want cystoscopy guided rosenbaum however. Received fluids and Cr improved which supports prerenal etiology. Also may have ATN after having hypotension 2/2 sepsis.     #SADIQ on CKD  Likely has a component of iATN and pre-renal as responsive to volume but creatinine still significantly off from baseline. Also has known obstructive component 2/2 BPH.  - encourage PO intake  - fluids as needed as per primary team if patient not eating  - pt does not want cystoscopy guided rosenbaum at this time, so any obstructive component of SADIQ may not resolve itself  - maintain MAP >70 for adequate renal perfusion  - avoid NSAIDS, PPIs and other nephrotoxic agents    Discussed plan with primary team, Nephrology Fellow and Attending.

## 2022-08-22 NOTE — PROGRESS NOTE ADULT - SUBJECTIVE AND OBJECTIVE BOX
Interventional, Pulmonary, Critical, Chest Special Procedures.    Pt was seen and fully examined by myself.     Time spent with patient in minutes: 37    Patient is a 89y old  Male who presents with a chief complaint of asp PNA, SADIQ on CKD (22 Aug 2022 08:39) The patient anhedonic, generalized malaise.     HPI:  89 y/o male PMH TIA, restrictive CM, CHF, HTN, HLD, Afib (on Eliquis), CKD (baseline Scr 2.0), BPH, urinary retention, limited mobility, brought from [facility] for concern of vomiting, hypoxia, sob, hypotension. Patient not providing history, obtained from chart and ED provider.  Patient with several episodes of NBNB vomiting since yesterday wich subsequently onset of shortness of breath and cough which has been progressively working. At baseline patient does not use any supplemental oxygen but was requiring 4L NC and was hypotensive to 100/60. Per records from [facility] no recent fever, chills, rhinorrhea, chest pain, palpitations, headaches, abdominal pain, diarrhea, constipation, dysuria, urgency, frequency.  HCP/daughter: Celina Bass [phone]    Hospital Course:  Vitals: T 97.7, HR 93, /64, RR 22, SaO2 96% 4L   Labs: WBC 13.88, Hgb 9.4, CO2 20, BUN 98, Scr 4.26, Albumin 3.0, AST 78, , Trop 0.22 --> 0.20, BNP 2189  CT Chest: 1. Possible aspiration pneumonitis worse on the right. 2. Old granulomatous disease. Mild atelectasis in the right lung base. Diffuse areas of subtle ground-glass mostly in the right lung possibly an aspiration pneumonitis given the history. Peripheral interstitial opacities possibly chronic fibrosis.  CTAP:  Multiple hepatic cysts. Gallstones with no definite evidence of acute cholecystitis. Bilateral renal cysts. Mild prominence of the right renal collecting system without obstructing stone. Colonic and duodenal diverticulosis.    EKG: sinus tachycardia with 1st degree heart block prolonged QRS  Intervention: Zofran 4mg IV, NS 1L, CTX 1g, Flagyl 500mg   Consult: none  (17 Aug 2022 04:13)      REVIEW OF SYSTEMS:  Constitutional: No fever, weight loss, chills + fatigue  Eyes: No eye pain, visual disturbances, or discharge  ENMT:  + difficulty hearing, tinnitus, vertigo; No sinus or throat pain. No epistaxis, +dysphagia, dysphonia, hoarseness no odynophagia  Neck: No pain, stiffness or neck swelling.  No masses or deformities  Respiratory: +cough, no wheezing, hemoptysis  - COPD  - ILD   - PE   - ASTHMA     - PNEUMONIA  Cardiovascular: No chest pain, AF dysrhythmia, palpitations, dizziness or edema - CAD   + CHF   + HTN  Gastrointestinal: No abdominal or epigastric pain. No nausea, vomiting or hematemesis; No diarrhea or constipation. No melena or hematochezia, Icterus.          Genitourinary: No dysuria, frequency, hematuria or incontinence   +CKD/SADIQ      - ESRD  Neurological: No headaches, memory loss, loss of strength, numbness or tremors      +DEMENTIA     - STROKE    - SEIZURE  Skin: No itching, burning, rashes or lesions   Lymph Nodes: No enlarged glands  Endocrine: No heat or cold intolerance; No hair loss       - DM     - THYROID DISORDER  Musculoskeletal: No joint pain or swelling; No muscle, back or extremity pain, No edema  Psychiatric: No depression, anxiety, mood swings or difficulty sleeping  Heme/Lymph: No easy bruising or bleeding gums         - ANEMIA      - CANCER   -COAGULOPATHY  Allergy and Immunologic: No hives or eczema    PAST MEDICAL & SURGICAL HISTORY:  Chronic CHF      Hypertension      Hyperlipidemia      Chronic kidney disease (CKD)      BPH (benign prostatic hyperplasia)      Chronic atrial fibrillation        FAMILY HISTORY:    SOCIAL HISTORY:      - Tobacco     - ETOH    Allergies    No Known Allergies    Intolerances      Vital Signs Last 24 Hrs  T(C): 36.7 (22 Aug 2022 09:44), Max: 36.7 (22 Aug 2022 09:44)  T(F): 98.1 (22 Aug 2022 09:44), Max: 98.1 (22 Aug 2022 09:44)  HR: 72 (22 Aug 2022 09:44) (64 - 73)  BP: 107/66 (22 Aug 2022 09:44) (94/58 - 114/61)  BP(mean): --  RR: 16 (22 Aug 2022 09:44) (16 - 19)  SpO2: 97% (22 Aug 2022 09:44) (73% - 97%)    Parameters below as of 22 Aug 2022 09:44  Patient On (Oxygen Delivery Method): room air        08-21 @ 07:01  -  08-22 @ 07:00  --------------------------------------------------------  IN: 0 mL / OUT: 700 mL / NET: -700 mL    08-22 @ 07:01  - 08-22 @ 15:32  --------------------------------------------------------  IN: 0 mL / OUT: 200 mL / NET: -200 mL          MEDICATIONS:  MEDICATIONS  (STANDING):  albuterol/ipratropium for Nebulization 3 milliLiter(s) Nebulizer every 6 hours  apixaban 2.5 milliGRAM(s) Oral every 12 hours  atorvastatin 10 milliGRAM(s) Oral at bedtime  finasteride 5 milliGRAM(s) Oral every 24 hours  lactated ringers. 1000 milliLiter(s) (75 mL/Hr) IV Continuous <Continuous>  lactated ringers. 1000 milliLiter(s) (70 mL/Hr) IV Continuous <Continuous>  piperacillin/tazobactam IVPB.. 2.25 Gram(s) IV Intermittent every 8 hours  polyethylene glycol 3350 17 Gram(s) Oral daily  senna 2 Tablet(s) Oral at bedtime  sertraline 50 milliGRAM(s) Oral every 24 hours  tamsulosin 0.4 milliGRAM(s) Oral at bedtime    MEDICATIONS  (PRN):  acetaminophen     Tablet .. 650 milliGRAM(s) Oral every 6 hours PRN Mild Pain (1 - 3)      PHYSICAL EXAM:  Un Comfortable, no distress  Eyes: PERRL, EOM intact; conjunctiva and sclera clear  Head: Normocephalic;  No Trauma  ENMT: No nasal discharge, hoarseness, +cough no hemoptysis  Neck: Supple; non tender; no masses or deformities.    No JVD  Respiratory:   - WHEEZING   + RHONCHI  - RALES  + CRACKLES.  Diminished breath sounds  BILATERAL  RIGHT  LEFT bases   Cardiovascular: Regular rate and rhythm. S1 and S2 Normal; No murmurs, gallops or rubs     - PPM/AICD  Gastrointestinal: Soft non-tender, non-distended; Normal bowel sounds; No hepatosplenomegaly.     -PEG    -  GT   - MOMIN  Genitourinary: No costovertebral angle tenderness. No dysuria  Extremities: AROM, No clubbing, cyanosis or edema    Vascular: Peripheral pulses palpable 2+ bilaterally  Neurological: Alert and responisve to stimuli   Skin: Warm and dry. No obvious rash  Lymph Nodes: No acute cervical or supraclavicular adenopathy  Psychiatric:m not really engaging    DEVICES:  - DENTURES   +IV R / L     - ETUBE   -TRACH   -CTUBE  R / L    LABS:                          7.3    6.86  )-----------( 209      ( 22 Aug 2022 05:30 )             21.8     08-22    139  |  104  |  84<H>  ----------------------------<  92  3.9   |  22  |  3.73<H>    Ca    8.7      22 Aug 2022 05:30  Phos  3.9     08-22  Mg     2.2     08-22    TPro  4.6<L>  /  Alb  2.1<L>  /  TBili  0.6  /  DBili  x   /  AST  60<H>  /  ALT  27  /  AlkPhos  30<L>  08-22      RADIOLOGY & ADDITIONAL STUDIES (The following images were personally reviewed):

## 2022-08-22 NOTE — PROGRESS NOTE ADULT - PROBLEM SELECTOR PLAN 1
PMH, BPH, CKD. Presented with SADIQ on admission, BUN/Scr 98/4.26 (baseline Scr 2), recent UTI (patient was being treated with Levaquin in NH for reported UTI for 7 days starting 8.13 UCx from 8/3 with kleb pneumo. Resolved metabolic acidosis likely secondary to elevated BUN. Urology unable to place bedside Epstein, recommended cystoscopy assisted Epsteni. Pt continues to decline. Resolved metabolic acidosis likely secondary to elevated BUN   - Likely secondary to prerenal (poor PO intake) vs ATN   - FENa: 1.0% prerenal, Francine<20  - renal U/S demonstrating intraparenchymal renal disease  - s/p  sodium bicarb 150meQ x1   - urinary output last 24H: 700    Plan:  - LR 1000 75cc/hr, replete cautiously with strict I&O. Stop fluids if PVR>500  - bladder scan q6  - trend Cr, avoid nephrotoxic drugs, renally dose meds  - F/u UA and Ucx  - renal following PMH, BPH, CKD. Presented with SADIQ on admission, BUN/Scr 98/4.26 (baseline Scr 2), recent UTI (patient was being treated with Levaquin in NH for reported UTI for 7 days starting 8.13 UCx from 8/3 with kleb pneumo. Resolved metabolic acidosis likely secondary to elevated BUN. Urology unable to place bedside Epstein, recommended cystoscopy assisted Epstein. Pt continues to decline. Resolved metabolic acidosis likely secondary to elevated BUN   - Likely secondary to prerenal (poor PO intake) vs ATN   - FENa: 1.0% prerenal, Francine<20  - renal U/S demonstrating intraparenchymal renal disease  - s/p  sodium bicarb 150meQ x1   - urinary output last 24H: 700    Plan:  - LR 1000 70cc/hr, replete cautiously with strict I&O. Stop fluids if PVR>500  - bladder scan q6  - trend Cr, avoid nephrotoxic drugs, renally dose meds  - renal following

## 2022-08-22 NOTE — PROGRESS NOTE ADULT - PROBLEM SELECTOR PLAN 4
Patient with recent cough, sob, hypoxia at [facility] with evidence of aspiration pneumonitis, likely in setting of vomiting.  Patient currently continues to cough but no longer hypoxic. No infiltrates or consolidations on imaging.     Plan:  - S&S cleared for minced and moist with thin liquids  - Maintain aspiration precautions

## 2022-08-22 NOTE — PROGRESS NOTE ADULT - SUBJECTIVE AND OBJECTIVE BOX
Medicine Progress Note (INCOMPLETE)    SUBJECTIVE / OVERNIGHT EVENTS:  O/N events:  Patient was seen and examined at bedside.  Otherwise negative ROS    MEDICATIONS  (STANDING):  albuterol/ipratropium for Nebulization 3 milliLiter(s) Nebulizer every 6 hours  apixaban 2.5 milliGRAM(s) Oral every 12 hours  atorvastatin 10 milliGRAM(s) Oral at bedtime  finasteride 5 milliGRAM(s) Oral every 24 hours  lactated ringers. 1000 milliLiter(s) (75 mL/Hr) IV Continuous <Continuous>  piperacillin/tazobactam IVPB.. 2.25 Gram(s) IV Intermittent every 8 hours  polyethylene glycol 3350 17 Gram(s) Oral daily  senna 2 Tablet(s) Oral at bedtime  sertraline 50 milliGRAM(s) Oral every 24 hours  tamsulosin 0.4 milliGRAM(s) Oral at bedtime    MEDICATIONS  (PRN):  acetaminophen     Tablet .. 650 milliGRAM(s) Oral every 6 hours PRN Mild Pain (1 - 3)    CAPILLARY BLOOD GLUCOSE            OBJECTIVE:  Vital Signs Last 24 Hrs  T(C): 36.6 (22 Aug 2022 06:33), Max: 36.6 (21 Aug 2022 20:58)  T(F): 97.9 (22 Aug 2022 06:33), Max: 97.9 (21 Aug 2022 20:58)  HR: 64 (22 Aug 2022 06:33) (64 - 73)  BP: 107/61 (22 Aug 2022 06:33) (94/58 - 114/61)  BP(mean): 80 (21 Aug 2022 12:45) (80 - 80)  RR: 19 (22 Aug 2022 06:33) (16 - 19)  SpO2: 96% (22 Aug 2022 06:33) (73% - 97%)    Parameters below as of 22 Aug 2022 06:33  Patient On (Oxygen Delivery Method): room air        PHYSICAL EXAM:  General: AAOx3, NAD  Head: NC/AT; MMM; PERRL; EOMI;  Neck: Supple; no JVD  Respiratory: CTAB; no wheezes/rales/rhonchi  Cardiovascular: Regular rhythm/rate; S1/S2+, no murmurs, rubs gallops   Gastrointestinal: Soft; NTND; bowel sounds normal and present  Extremities: WWP; no edema/cyanosis  Neurological: CNII-XII grossly intact; no obvious focal deficits  Skin: Clean and intact. Good skin turgor. Without open wounds and sores  I&O's Summary    21 Aug 2022 07:01  -  22 Aug 2022 07:00  --------------------------------------------------------  IN: 0 mL / OUT: 700 mL / NET: -700 mL    22 Aug 2022 07:01  -  22 Aug 2022 08:41  --------------------------------------------------------  IN: 0 mL / OUT: 200 mL / NET: -200 mL        LABS:                        7.6    7.24  )-----------( 245      ( 21 Aug 2022 10:28 )             22.5     08-21    137  |  103  |  98<H>  ----------------------------<  91  3.5   |  21<L>  |  3.85<H>    Ca    8.8      21 Aug 2022 10:28  Phos  4.9     08-21  Mg     2.3     08-21                    RADIOLOGY & ADDITIONAL TESTS:  Imaging from Last 24 Hours: Medicine Progress Note    SUBJECTIVE / OVERNIGHT EVENTS:  O/N events: No acute events O/N. PVR 6:00 362  Patient was seen and examined at bedside, resting comfortably. Reports improvement in breathing and appetite. Denies fever, chills, CP, abdominal pain, suprapubic tenderness. Otherwise negative ROS    MEDICATIONS  (STANDING):  albuterol/ipratropium for Nebulization 3 milliLiter(s) Nebulizer every 6 hours  apixaban 2.5 milliGRAM(s) Oral every 12 hours  atorvastatin 10 milliGRAM(s) Oral at bedtime  finasteride 5 milliGRAM(s) Oral every 24 hours  lactated ringers. 1000 milliLiter(s) (75 mL/Hr) IV Continuous <Continuous>  piperacillin/tazobactam IVPB.. 2.25 Gram(s) IV Intermittent every 8 hours  polyethylene glycol 3350 17 Gram(s) Oral daily  senna 2 Tablet(s) Oral at bedtime  sertraline 50 milliGRAM(s) Oral every 24 hours  tamsulosin 0.4 milliGRAM(s) Oral at bedtime    MEDICATIONS  (PRN):  acetaminophen     Tablet .. 650 milliGRAM(s) Oral every 6 hours PRN Mild Pain (1 - 3)    CAPILLARY BLOOD GLUCOSE            OBJECTIVE:  Vital Signs Last 24 Hrs  T(C): 36.6 (22 Aug 2022 06:33), Max: 36.6 (21 Aug 2022 20:58)  T(F): 97.9 (22 Aug 2022 06:33), Max: 97.9 (21 Aug 2022 20:58)  HR: 64 (22 Aug 2022 06:33) (64 - 73)  BP: 107/61 (22 Aug 2022 06:33) (94/58 - 114/61)  BP(mean): 80 (21 Aug 2022 12:45) (80 - 80)  RR: 19 (22 Aug 2022 06:33) (16 - 19)  SpO2: 96% (22 Aug 2022 06:33) (73% - 97%)    Parameters below as of 22 Aug 2022 06:33  Patient On (Oxygen Delivery Method): room air        PHYSICAL EXAM:  General: AAOx3, NAD  Head: NC/AT; MMM; PERRL; EOMI;  Neck: Supple; no JVD  Respiratory: CTAB; no wheezes/rales/rhonchi  Cardiovascular: Regular rhythm/rate; S1/S2+, no murmurs, rubs gallops   Gastrointestinal: Soft; NT; bowel sounds normal and present  Extremities: WWP; no edema/cyanosis  Neurological: CNII-XII grossly intact; no obvious focal deficits  Skin: Clean and intact. Poor skin turgor. Saccral wound and R heel ulcer with pressure dressings   I&O's Summary    21 Aug 2022 07:01  -  22 Aug 2022 07:00  --------------------------------------------------------  IN: 0 mL / OUT: 700 mL / NET: -700 mL    22 Aug 2022 07:01  -  22 Aug 2022 08:41  --------------------------------------------------------  IN: 0 mL / OUT: 200 mL / NET: -200 mL        LABS:                        7.6    7.24  )-----------( 245      ( 21 Aug 2022 10:28 )             22.5     08-21    137  |  103  |  98<H>  ----------------------------<  91  3.5   |  21<L>  |  3.85<H>    Ca    8.8      21 Aug 2022 10:28  Phos  4.9     08-21  Mg     2.3     08-21                    RADIOLOGY & ADDITIONAL TESTS:  Imaging from Last 24 Hours:

## 2022-08-22 NOTE — PROGRESS NOTE ADULT - ASSESSMENT
89 y/o male PMH TIA, restrictive CM, CHF, HTN, HLD, Afib (on Eliquis), CKD (baseline Scr 2.0), BPH, urinary retention, limited mobility, brought from [facility] for oconcern of vomiting, hypoxia, sob, hypotension likely due to aspiration pneumonia and SADIQ on CKD.     IVF as needed  monitor PO intake  --cont IV zosyn  refuses care at times  : rosenbaum refused

## 2022-08-23 NOTE — DISCHARGE NOTE NURSING/CASE MANAGEMENT/SOCIAL WORK - PATIENT PORTAL LINK FT
You can access the FollowMyHealth Patient Portal offered by [facility] by registering at the following website: [email or web address]. By joining [facility]’s FollowMyHealth portal, you will also be able to view your health information using other applications (apps) compatible with our system.

## 2022-08-23 NOTE — PROGRESS NOTE ADULT - PROBLEM SELECTOR PLAN 4
Patient with recent cough, sob, hypoxia at [facility] with evidence of aspiration pneumonitis, likely in setting of vomiting.  Patient currently continues to cough but no longer hypoxic. No infiltrates or consolidations on imaging.     Plan:  - S&S cleared for minced and moist with thin liquids  - Maintain aspiration precautions

## 2022-08-23 NOTE — PROGRESS NOTE ADULT - SUBJECTIVE AND OBJECTIVE BOX
Physical Medicine and Rehabilitation Progress Note :    Patient is a 89y old  Male who presents with a chief complaint of sepsis (22 Aug 2022 18:13)      HPI:  89 y/o male PMH TIA, restrictive CM, CHF, HTN, HLD, Afib (on Eliquis), CKD (baseline Scr 2.0), BPH, urinary retention, limited mobility, brought from [facility] for concern of vomiting, hypoxia, sob, hypotension. Patient not providing history, obtained from chart and ED provider.  Patient with several episodes of NBNB vomiting since yesterday wich subsequently onset of shortness of breath and cough which has been progressively working. At baseline patient does not use any supplemental oxygen but was requiring 4L NC and was hypotensive to 100/60. Per records from [facility] no recent fever, chills, rhinorrhea, chest pain, palpitations, headaches, abdominal pain, diarrhea, constipation, dysuria, urgency, frequency.  HCP/daughter: Celina Bass [phone]    Hospital Course:  Vitals: T 97.7, HR 93, /64, RR 22, SaO2 96% 4L   Labs: WBC 13.88, Hgb 9.4, CO2 20, BUN 98, Scr 4.26, Albumin 3.0, AST 78, , Trop 0.22 --> 0.20, BNP 2189  CT Chest: 1. Possible aspiration pneumonitis worse on the right. 2. Old granulomatous disease. Mild atelectasis in the right lung base. Diffuse areas of subtle ground-glass mostly in the right lung possibly an aspiration pneumonitis given the history. Peripheral interstitial opacities possibly chronic fibrosis.  CTAP:  Multiple hepatic cysts. Gallstones with no definite evidence of acute cholecystitis. Bilateral renal cysts. Mild prominence of the right renal collecting system without obstructing stone. Colonic and duodenal diverticulosis.    EKG: sinus tachycardia with 1st degree heart block prolonged QRS  Intervention: Zofran 4mg IV, NS 1L, CTX 1g, Flagyl 500mg   Consult: none  (17 Aug 2022 04:13)                            7.2    7.68  )-----------( 191      ( 23 Aug 2022 05:30 )             21.2       08-23    136  |  104  |  83<H>  ----------------------------<  124<H>  3.6   |  22  |  3.59<H>    Ca    8.5      23 Aug 2022 05:30  Phos  3.7     08-23  Mg     2.2     08-23    TPro  4.2<L>  /  Alb  2.0<L>  /  TBili  0.6  /  DBili  x   /  AST  48<H>  /  ALT  25  /  AlkPhos  29<L>  08-23    Vital Signs Last 24 Hrs  T(C): 36.6 (23 Aug 2022 09:45), Max: 36.6 (22 Aug 2022 15:25)  T(F): 97.9 (23 Aug 2022 09:45), Max: 97.9 (22 Aug 2022 21:47)  HR: 75 (23 Aug 2022 09:45) (71 - 75)  BP: 100/57 (23 Aug 2022 09:45) (90/57 - 106/64)  BP(mean): --  RR: 18 (23 Aug 2022 09:45) (17 - 18)  SpO2: 97% (23 Aug 2022 09:45) (97% - 98%)    Parameters below as of 23 Aug 2022 09:45  Patient On (Oxygen Delivery Method): room air        MEDICATIONS  (STANDING):  albuterol/ipratropium for Nebulization 3 milliLiter(s) Nebulizer every 6 hours  apixaban 2.5 milliGRAM(s) Oral every 12 hours  atorvastatin 10 milliGRAM(s) Oral at bedtime  finasteride 5 milliGRAM(s) Oral every 24 hours  lactated ringers. 1000 milliLiter(s) (75 mL/Hr) IV Continuous <Continuous>  lactated ringers. 1000 milliLiter(s) (70 mL/Hr) IV Continuous <Continuous>  piperacillin/tazobactam IVPB.. 2.25 Gram(s) IV Intermittent every 8 hours  polyethylene glycol 3350 17 Gram(s) Oral daily  senna 2 Tablet(s) Oral at bedtime  sertraline 50 milliGRAM(s) Oral every 24 hours  tamsulosin 0.4 milliGRAM(s) Oral at bedtime    MEDICATIONS  (PRN):  acetaminophen     Tablet .. 650 milliGRAM(s) Oral every 6 hours PRN Mild Pain (1 - 3)    Currently Undergoing Physical/ Occupational Therapy at bedside    PT Functional Status Assessment :           Cognitive/Neuro/Behavioral  Level of Consciousness: confused  Arousal Level: arouses to voice  Orientation: disoriented to;  name of hospital and month/day;  situation  Speech: clear  Mood/Behavior: uncooperative;  angry;  cooperative;  fluctuating mood    Language Assistance  Preferred Language to Address Healthcare Preferred Language to Address Healthcare: English    Therapeutic Interventions      Bed Mobility  Bed Mobility Training Rehab Potential: none;  Pt is at baseline.  Bed Mobility Training Symptoms Noted During/After Treatment: none  Bed Mobility Training Rolling/Turning: maximum assist (25% patient effort);  1 person assist;  bed rails;  verbal cues;  nonverbal cues (demo/gestures);  set-up required  Bed Mobility Training Scooting: dependent (less than 25% patient effort);  2 person assist  Bed Mobility Training Sit-to-Supine: nonverbal cues (demo/gestures);  verbal cues;  set-up required;  maximum assist (25% patient effort);  1 person assist  Bed Mobility Training Supine-to-Sit: maximum assist (25% patient effort);  2 person assist;  nonverbal cues (demo/gestures);  verbal cues;  set-up required;  bed rails  Bed Mobility Training Limitations: decreased strength;  impaired balance;  cognitive, decreased safety awareness;  decreased ability to use arms for pushing/pulling;  impaired ability to control trunk for mobility;  decreased ability to use legs for bridging/pushing;  impaired postural control    Balance Skills Training  Balance Skills Training Rehab Potential: poor  Balance Skills Training Symptoms Noted During/After Treatment: none  Balance Skills Training Training Strategies: Pt able to dangle ~3 mins with UE support on bed rails with ability to let go and maintain static balance with SBA/supervision but became agitated with attempts for therapeutic exercise at EOB and refused to participate.   Balance Skills Training Sitting Balance: Static: fair minus  Balance Skills Sitting Balance: Dynamic: Fair minus  Balance Skills Systems Impairment Contributing to Balance Disturbance: cognitive;  musculoskeletal;  neuromuscular  Balance Skills Identified Impairments Contributing to Balance Disturbance: decreased strength;  impaired postural control          PM&R Impression : as above    Current Disposition Plan Recommendations :    subacute rehab placement

## 2022-08-23 NOTE — PROGRESS NOTE ADULT - ASSESSMENT
89 y/o male PMH TIA, restrictive CM, CHF, HTN, HLD, Afib (on Eliquis), CKD (baseline Scr 2.0), BPH, urinary retention, limited mobility, brought from [facility] for oconcern of vomiting, hypoxia, sob, hypotension likely due to aspiration pneumonia and SADIQ on CKD.       monitor PO intake  acceptable   stable  -t IV zosyn completed    d/c pending

## 2022-08-23 NOTE — PROGRESS NOTE ADULT - ASSESSMENT
ASSESSMENT/PLAN89 y/o male PMH TIA, restrictive CM, CHF, HTN, HLD, Afib (on Eliquis), CKD (baseline Scr 2.0), BPH, urinary retention, limited mobility, brought from [facility] for vomiting, hypoxia, sob, hypotension likely due to aspiration pneumonitis,  UTI, found to have SADIQ on admission.    1. O2 attempt  off   2. Bronchodilators:  Atrovent/ albuterol q 4 – 6 hours as needed  3. Corticosteroids: off  4. ID/Antibiotics: continue Zosyn to 10 days   5. Cardiac/HTN: optimize BP   6. GI: Rx/ prophylaxis c PPI/H2B  7. Heme: Rx/VT prophylaxis c SQH/SCD/AC pt on Eliquis  8. Aspiration precautions,   9. Nephrology feedback     Discussed c managing team

## 2022-08-23 NOTE — DISCHARGE NOTE NURSING/CASE MANAGEMENT/SOCIAL WORK - NSDCFUADDAPPT_GEN_ALL_CORE_FT
Please arrive 15 minutes early,  bring your Insurance card, Current List of Medications, Photo ID and Discharge paperwork to the following appointments:    (1) Please follow up with your Urology Provider, Dr. Robert Phipps at [address] [place] [address] on 09/06/2022 at 3:40pm.    Appointment was scheduled by Ms. HANNAH Giraldo, Referral Coordinator.    (2) Please follow up with your Primary Care Provider, Dr. Vega Jefferson at [address] [place] [address] on 09/12/2022 at 11:30am.    Appointment was scheduled by Ms. HANNAH Giraldo, Referral Coordinator.    (3) Please follow up with your Nephrology Provider, Aidan Manzanares at [address], [address], [place] [address] on 10/03/2022 at 11:40am.    Appointment was scheduled by Ms. HANNAH Giraldo, Referral Coordinator.

## 2022-08-23 NOTE — PROGRESS NOTE ADULT - SUBJECTIVE AND OBJECTIVE BOX
SUBJECTIVE / OVERNIGHT EVENTS:     resting comfortably. Reports improvement in breathing and appetite. Denies fever, chills, CP, abdominal pain, suprapubic tenderness.  MEDICATIONS  (STANDING):  albuterol/ipratropium for Nebulization 3 milliLiter(s) Nebulizer every 6 hours  apixaban 2.5 milliGRAM(s) Oral every 12 hours  atorvastatin 10 milliGRAM(s) Oral at bedtime  finasteride 5 milliGRAM(s) Oral every 24 hours  lactated ringers. 1000 milliLiter(s) (75 mL/Hr) IV Continuous <Continuous>  piperacillin/tazobactam IVPB.. 2.25 Gram(s) IV Intermittent every 8 hours  polyethylene glycol 3350 17 Gram(s) Oral daily  senna 2 Tablet(s) Oral at bedtime  sertraline 50 milliGRAM(s) Oral every 24 hours  tamsulosin 0.4 milliGRAM(s) Oral at bedtime    MEDICATIONS  (PRN):  acetaminophen     Tablet .. 650 milliGRAM(s) Oral every 6 hours PRN Mild Pain (1 - 3)    CAPILLARY BLOOD GLUCOSE      Vital Signs Last 24 Hrs  T(C): 36.5 (23 Aug 2022 15:29), Max: 36.6 (22 Aug 2022 21:47)  T(F): 97.7 (23 Aug 2022 15:29), Max: 97.9 (22 Aug 2022 21:47)  HR: 70 (23 Aug 2022 15:29) (70 - 75)  BP: 114/59 (23 Aug 2022 15:29) (90/57 - 114/59)  BP(mean): --  RR: 18 (23 Aug 2022 15:29) (17 - 18)  SpO2: 95% (23 Aug 2022 15:29) (95% - 98%)    Parameters below as of 23 Aug 2022 15:29  Patient On (Oxygen Delivery Method): room air        Parameters below as of 22 Aug 2022 06:33  Patient On (Oxygen Delivery Method): room air        PHYSICAL EXAM:  General: AAOx3, NAD  Head: NC/AT; MMM; PERRL; EOMI;  Neck: Supple; no JVD  Respiratory: CTAB; no wheezes/rales/rhonchi  Cardiovascular: Regular rhythm/rate; S1/S2+, no murmurs, rubs gallops   Gastrointestinal: Soft; NT; bowel sounds normal and present  Extremities: WWP; no edema/cyanosis  Neurological: CNII-XII grossly intact; no obvious focal deficits  Skin: Clean and intact. Poor skin turgor. Saccral wound and R heel ulcer with pressure dressings   I&O's Summary    21 Aug 2022 07:01  -  22 Aug 2022 07:00  --------------------------------------------------------  IN: 0 mL / OUT: 700 mL / NET: -700 mL    22 Aug 2022 07:01  -  22 Aug 2022 08:41  --------------------------------------------------------  IN: 0 mL / OUT: 200 mL / NET: -200 mL        LABS:                          7.2    7.68  )-----------( 191      ( 23 Aug 2022 05:30 )             21.2                       7.6    7.24  )-----------( 245      ( 21 Aug 2022 10:28 )             22.5     08-21    137  |  103  |  98<H>  ----------------------------<  91  3.5   |  21<L>  |  3.85<H>    Ca    8.8      21 Aug 2022 10:28  Phos  4.9     08-21  Mg     2.3     08-21      08-23    136  |  104  |  83<H>  ----------------------------<  124<H>  3.6   |  22  |  3.59<H>    Ca    8.5      23 Aug 2022 05:30  Phos  3.7     08-23  Mg     2.2     08-23    TPro  4.2<L>  /  Alb  2.0<L>  /  TBili  0.6  /  DBili  x   /  AST  48<H>  /  ALT  25  /  AlkPhos  29<L>  08-23                RADIOLOGY & ADDITIONAL TESTS:  Imaging from Last 24 Hours:

## 2022-08-23 NOTE — PROGRESS NOTE ADULT - SUBJECTIVE AND OBJECTIVE BOX
Patient is a 89y Male seen and evaluated at bedside. In no acute distress. Does not offer any complaints. Patient continues to refuse any interventions.       Meds:    acetaminophen     Tablet .. 650 every 6 hours PRN  albuterol/ipratropium for Nebulization 3 every 6 hours  apixaban 2.5 every 12 hours  atorvastatin 10 at bedtime  finasteride 5 every 24 hours  lactated ringers. 1000 <Continuous>  lactated ringers. 1000 <Continuous>  piperacillin/tazobactam IVPB.. 2.25 every 8 hours  polyethylene glycol 3350 17 daily  senna 2 at bedtime  sertraline 50 every 24 hours  tamsulosin 0.4 at bedtime      T(C): , Max: 36.6 (08-22-22 @ 15:25)  T(F): , Max: 97.9 (08-22-22 @ 21:47)  HR: 75 (08-23-22 @ 09:45)  BP: 100/57 (08-23-22 @ 09:45)  BP(mean): --  RR: 18 (08-23-22 @ 09:45)  SpO2: 97% (08-23-22 @ 09:45)  Wt(kg): --    08-22 @ 07:01  -  08-23 @ 07:00  --------------------------------------------------------  IN: 660 mL / OUT: 850 mL / NET: -190 mL      Height (cm): 180.3 (08-22 @ 15:25)  Weight (kg): 68 (08-22 @ 13:39)  BMI (kg/m2): 20.9 (08-22 @ 15:25)  BSA (m2): 1.87 (08-22 @ 15:25)    Review of Systems:  ROS negative except as per HPI      PHYSICAL EXAM:  GENERAL: laying in bed, NAD  HEENT: mildly dry MM. neck supple, no JVP  CHEST/LUNG: speaking in full sentences, no accessory muscle use  HEART: Regular rate and rhythm, no murmur, no gallops, no rub   ABDOMEN: Soft, nontender, mildly distended  : Suprapubic tenderness  EXTREMITIES: no pedal edema, b/l off loading boots  Psych: calm, appropriate      LABS:                        7.2    7.68  )-----------( 191      ( 23 Aug 2022 05:30 )             21.2     08-23    136  |  104  |  83<H>  ----------------------------<  124<H>  3.6   |  22  |  3.59<H>    Ca    8.5      23 Aug 2022 05:30  Phos  3.7     08-23  Mg     2.2     08-23    TPro  4.2<L>  /  Alb  2.0<L>  /  TBili  0.6  /  DBili  x   /  AST  48<H>  /  ALT  25  /  AlkPhos  29<L>  08-23                RADIOLOGY & ADDITIONAL STUDIES:

## 2022-08-23 NOTE — DISCHARGE NOTE NURSING/CASE MANAGEMENT/SOCIAL WORK - NSDCPEFALRISK_GEN_ALL_CORE
For information on Fall & Injury Prevention, visit: [email or web address] OR  [email or web address] OR  https://www.cdc.gov/steadi/patient.html

## 2022-08-23 NOTE — PROGRESS NOTE ADULT - ASSESSMENT
per Internal Medicine    89 y/o male PMH TIA, restrictive CM, CHF, HTN, HLD, Afib (on Eliquis), CKD (baseline Scr 2.0), BPH, urinary retention, limited mobility, brought from Replaced by [facility] for oconcern of vomiting, hypoxia, sob, hypotension likely due to aspiration pneumonia and SADIQ on CKD.     IVF as needed  monitor PO intake  --cont IV zosyn  refuses care at times  : rosenbaum refused     Problem/Plan - 1:  ·  Problem: Acute kidney injury superimposed on CKD.   ·  Plan: PMH, BPH, CKD. Presented with SADIQ on admission, BUN/Scr 98/4.26 (baseline Scr 2), recent UTI (patient was being treated with Levaquin in NH for reported UTI for 7 days starting 8.13 UCx from 8/3 with kleb pneumo. Resolved metabolic acidosis likely secondary to elevated BUN. Urology unable to place bedside Rosenbaum, recommended cystoscopy assisted Rosenbaum. Pt continues to decline. Resolved metabolic acidosis likely secondary to elevated BUN   - Likely secondary to prerenal (poor PO intake) vs ATN   - FENa: 1.0% prerenal, Francine<20  - renal U/S demonstrating intraparenchymal renal disease  - s/p  sodium bicarb 150meQ x1   - urinary output last 24H: 700    Plan:  - LR 1000 70cc/hr, replete cautiously with strict I&O. Stop fluids if PVR>500  - bladder scan q6  - trend Cr, avoid nephrotoxic drugs, renally dose meds  - renal following.    Problem/Plan - 2:  ·  Problem: Heart failure with mid-range ejection fraction.   ·  Plan: Patient with CHF, no previous echo, BNP on admission 2189, unclear if ischemic or nonischemic etiology but patient is documented to have restrictive cardiomyopathy  Home medications Coreg 3.125mg BID, Enalapril 5mg qd, Lasix 20mg qd, Hydralazine 50mg TID.  Patient clinically euvolemic on exam.  TTE: LVEF 45-50% with global hypokinesis. Abnormal septal motion seen due to a left bundle branch block. Mild MR, mild AR, trivial PCD effusion  - Euvolemic on exam, - JVD, - b/l L/E edema, current b/l base crackles likely secondary to asp PNA    Plan:   - strict I/Os, monitor UOP carefully.    Problem/Plan - 3:  ·  Problem: Sepsis.   ·  Plan: Patient meeting two SIRS critera HR 93 WBC 13.88 on admission possible sources aspiration pneumonitis and UTI.  Patient has UCx positive for the kleb pnuemo on 8/3 and was being treated with Levaquin since 8/13.  Abdominal source unlikely patient without diarrhea, no evidence of infection on CTAP.  Patient remains afebrile, hemodynamically stable   - improving leukocytosis, procal 1.94    - c/w zosyn 2.25 q8 (8/18-8/25) adjusted for age   - Bcx NTD  - F/u UA/ Ucx unable to straight cath   - c/w vest PT BID.    Problem/Plan - 4:  ·  Problem: Aspiration pneumonitis.   ·  Plan: Patient with recent cough, sob, hypoxia at Replaced by [facility], CTAP with evidence of aspiration pneumonitis, likely in setting of vomiting.  Patient currently continues to cough but no longer hypoxic. No infiltrates or consolidations on imaging.     Plan:  - S&S cleared for minced and moist with thin liquids  - Maintain aspiration precautions.    Problem/Plan - 5:  ·  Problem: Nausea & vomiting.   ·  Plan: RESOLVED. Patient with nausea and vomiting for two days, possibly in setting of uremia or UTI.  Other differential include viral gastroenteritis, no evidence of colitis or most severe GI infection on imaging and no reported diarrhea.  Cardiac etiology unlikely, patient without symptoms, no ischemia on EKG, trop likely up in setting of renal failure now peaked.  - CTH: no acute pathology     Plan:  - Continue Zofran PRN vomiting.    Problem/Plan - 6:  ·  Problem: Metabolic encephalopathy.   ·  Plan: RESOLVED. Patient A&Ox1 on admission, unknown baseline, unclear if there is underlying dementia of if this is an acute process.   Likely 2/2 to uremia. Patient now AAOx3    - continue to monitor          # Elevated troponin   PMH constrictive CM  Patient with troponin 0.22 on admission which trended down to 0.2, likely elevated in setting of SADIQ and CKD.   - troponin downtrending 0.2-> 0.18.    Problem/Plan - 7:  ·  Problem: Sacral wound.   ·  Plan: Pressure sacral wound    Plan:  - maintain pressure dressing.    Problem/Plan - 8:  ·  Problem: BPH (benign prostatic hyperplasia).   ·  Plan: Patient with history of BPH complicated by urinary retention, home medications Finasteride 5mg qd, and Tamsulosin 0.4mg qd.   - Continue home medications.    Problem/Plan - 9:  ·  Problem: Anemia.   ·  Plan: Hgb on admission 9.4 (baseline 8) normocytic, likely GM vs AOCD in setting of renal failure. Currently no signs of active bleeding (no hematochezia, melena, hemoptysis, hematuria)  - Anemia of chronic disease: normal Fe, high ferritin     - trend CBC  - maintain active T&S (8/19)  - transfuse if Hgb <7.

## 2022-08-23 NOTE — PROGRESS NOTE ADULT - SUBJECTIVE AND OBJECTIVE BOX
Interventional, Pulmonary, Critical, Chest Special Procedures.    Pt was seen and fully examined by myself.     Time spent with patient in minutes: 37    Patient is a 89y old  Male who presents with a chief complaint of sepsis (22 Aug 2022 18:13)  The patient ill appearing, generalized malaise, some weak cough,     HPI:  89 y/o male PMH TIA, restrictive CM, CHF, HTN, HLD, Afib (on Eliquis), CKD (baseline Scr 2.0), BPH, urinary retention, limited mobility, brought from [facility] for concern of vomiting, hypoxia, sob, hypotension. Patient not providing history, obtained from chart and ED provider.  Patient with several episodes of NBNB vomiting since yesterday wich subsequently onset of shortness of breath and cough which has been progressively working. At baseline patient does not use any supplemental oxygen but was requiring 4L NC and was hypotensive to 100/60. Per records from [facility] no recent fever, chills, rhinorrhea, chest pain, palpitations, headaches, abdominal pain, diarrhea, constipation, dysuria, urgency, frequency.  HCP/daughter: Celina Bass [phone]    Hospital Course:  Vitals: T 97.7, HR 93, /64, RR 22, SaO2 96% 4L   Labs: WBC 13.88, Hgb 9.4, CO2 20, BUN 98, Scr 4.26, Albumin 3.0, AST 78, , Trop 0.22 --> 0.20, BNP 2189  CT Chest: 1. Possible aspiration pneumonitis worse on the right. 2. Old granulomatous disease. Mild atelectasis in the right lung base. Diffuse areas of subtle ground-glass mostly in the right lung possibly an aspiration pneumonitis given the history. Peripheral interstitial opacities possibly chronic fibrosis.  CTAP:  Multiple hepatic cysts. Gallstones with no definite evidence of acute cholecystitis. Bilateral renal cysts. Mild prominence of the right renal collecting system without obstructing stone. Colonic and duodenal diverticulosis.    EKG: sinus tachycardia with 1st degree heart block prolonged QRS  Intervention: Zofran 4mg IV, NS 1L, CTX 1g, Flagyl 500mg   Consult: none  (17 Aug 2022 04:13)      REVIEW OF SYSTEMS:  Constitutional: No fever, weight loss, chills + fatigue  Eyes: No eye pain, visual disturbances, or discharge  ENMT:  + difficulty hearing, tinnitus, vertigo; No sinus or throat pain. No epistaxis, +dysphagia, dysphonia, hoarseness no odynophagia  Neck: No pain, stiffness or neck swelling.  No masses or deformities  Respiratory: +cough, no wheezing, hemoptysis  - COPD  - ILD   - PE   - ASTHMA     - PNEUMONIA  Cardiovascular: No chest pain, AF dysrhythmia, palpitations, dizziness or edema - CAD   + CHF   + HTN  Gastrointestinal: No abdominal or epigastric pain. No nausea, vomiting or hematemesis; No diarrhea or constipation. No melena or hematochezia, Icterus.          Genitourinary: No dysuria, frequency, hematuria or incontinence   +CKD/SADIQ      - ESRD  Neurological: No headaches, memory loss, loss of strength, numbness or tremors      +DEMENTIA     - STROKE    - SEIZURE  Skin: No itching, burning, rashes or lesions   Lymph Nodes: No enlarged glands  Endocrine: No heat or cold intolerance; No hair loss       - DM     - THYROID DISORDER  Musculoskeletal: No joint pain or swelling; No muscle, back or extremity pain, No edema  Psychiatric: No depression, anxiety, mood swings or difficulty sleeping  Heme/Lymph: No easy bruising or bleeding gums         - ANEMIA      - CANCER   -COAGULOPATHY  Allergy and Immunologic: No hives or eczema    PAST MEDICAL & SURGICAL HISTORY:  Chronic CHF      Hypertension      Hyperlipidemia      Chronic kidney disease (CKD)      BPH (benign prostatic hyperplasia)      Chronic atrial fibrillation        FAMILY HISTORY:    SOCIAL HISTORY:      - Tobacco     - ETOH    Allergies    No Known Allergies    Intolerances      Vital Signs Last 24 Hrs  T(C): 36.6 (23 Aug 2022 09:45), Max: 36.6 (22 Aug 2022 15:25)  T(F): 97.9 (23 Aug 2022 09:45), Max: 97.9 (22 Aug 2022 21:47)  HR: 75 (23 Aug 2022 09:45) (71 - 75)  BP: 100/57 (23 Aug 2022 09:45) (90/57 - 106/64)  BP(mean): --  RR: 18 (23 Aug 2022 09:45) (17 - 18)  SpO2: 97% (23 Aug 2022 09:45) (97% - 98%)    Parameters below as of 23 Aug 2022 09:45  Patient On (Oxygen Delivery Method): room air        08-22 @ 07:01  -  08-23 @ 07:00  --------------------------------------------------------  IN: 660 mL / OUT: 850 mL / NET: -190 mL          MEDICATIONS:  MEDICATIONS  (STANDING):  albuterol/ipratropium for Nebulization 3 milliLiter(s) Nebulizer every 6 hours  apixaban 2.5 milliGRAM(s) Oral every 12 hours  atorvastatin 10 milliGRAM(s) Oral at bedtime  finasteride 5 milliGRAM(s) Oral every 24 hours  lactated ringers. 1000 milliLiter(s) (75 mL/Hr) IV Continuous <Continuous>  lactated ringers. 1000 milliLiter(s) (70 mL/Hr) IV Continuous <Continuous>  piperacillin/tazobactam IVPB.. 2.25 Gram(s) IV Intermittent every 8 hours  polyethylene glycol 3350 17 Gram(s) Oral daily  senna 2 Tablet(s) Oral at bedtime  sertraline 50 milliGRAM(s) Oral every 24 hours  tamsulosin 0.4 milliGRAM(s) Oral at bedtime    MEDICATIONS  (PRN):  acetaminophen     Tablet .. 650 milliGRAM(s) Oral every 6 hours PRN Mild Pain (1 - 3)      PHYSICAL EXAM:  Un Comfortable, no distress  Eyes: PERRL, EOM intact; conjunctiva and sclera clear  Head: Normocephalic;  No Trauma  ENMT: No nasal discharge, hoarseness, +cough no hemoptysis  Neck: Supple; non tender; no masses or deformities.    No JVD  Respiratory:   - WHEEZING   + RHONCHI  - RALES  + CRACKLES.  Diminished breath sounds  BILATERAL  RIGHT  LEFT bases   Cardiovascular: Regular rate and rhythm. S1 and S2 Normal; No murmurs, gallops or rubs     - PPM/AICD  Gastrointestinal: Soft non-tender, non-distended; Normal bowel sounds; No hepatosplenomegaly.     -PEG    -  GT   - MOMIN  Genitourinary: No costovertebral angle tenderness. No dysuria  Extremities: AROM, No clubbing, cyanosis or edema    Vascular: Peripheral pulses palpable 2+ bilaterally  Neurological: Alert and responisve to stimuli   Skin: Warm and dry. No obvious rash  Lymph Nodes: No acute cervical or supraclavicular adenopathy  Psychiatric:m not really engaging    DEVICES:  - DENTURES   +IV R / L     - ETUBE   -TRACH   -CTUBE  R / L    LABS:                          7.2    7.68  )-----------( 191      ( 23 Aug 2022 05:30 )             21.2     08-23    136  |  104  |  83<H>  ----------------------------<  124<H>  3.6   |  22  |  3.59<H>    Ca    8.5      23 Aug 2022 05:30  Phos  3.7     08-23  Mg     2.2     08-23    TPro  4.2<L>  /  Alb  2.0<L>  /  TBili  0.6  /  DBili  x   /  AST  48<H>  /  ALT  25  /  AlkPhos  29<L>  08-23      RADIOLOGY & ADDITIONAL STUDIES (The following images were personally reviewed):

## 2022-08-23 NOTE — PROGRESS NOTE ADULT - ASSESSMENT
89 y/o male PMH TIA, restrictive CM, CHF, HTN, HLD, Afib (on Eliquis), CKD (baseline Scr 2.0), BPH, urinary retention, limited mobility, brought from [facility] for oconcern of vomiting, hypoxia, sob, hypotension likely due to aspiration pneumonia and SADIQ on CKD.  Nephrology consulted for SADIQ.        # non- oliguric SADIQ on CKD (baseline Scr 2.0)  Likely has a component of iATN and pre-renal as responsive to volume but creatinine still significantly off from baseline. Also has known obstructive component 2/2 BPH.  - encourage PO intake  - fluids as needed as per primary team if patient not eating  - pt does not want cystoscopy guided rosenbaum at this time, so any obstructive component of SADIQ may not resolve itself  - maintain MAP >70 for adequate renal perfusion  - avoid NSAIDS, PPIs and other nephrotoxic agents

## 2022-08-24 NOTE — PROGRESS NOTE ADULT - PROBLEM SELECTOR PLAN 4
Patient with recent cough, sob, hypoxia at [facility] with evidence of aspiration pneumonitis, likely in setting of vomiting.  Patient currently continues to cough but no longer hypoxic, saturating 98% on RA  No infiltrates or consolidations on imaging.     Plan:  - S&S cleared for minced and moist with thin liquids, tolerating diet well  - Maintain aspiration precautions

## 2022-08-24 NOTE — PROGRESS NOTE ADULT - SUBJECTIVE AND OBJECTIVE BOX
OVERNIGHT: Patient retaining urine ON, reported two episodes of 300CC around 9PM and one closer to the morning. Patient continues to refuse any catheterization.    SUBJECTIVE:. Reports breathing is well, and he slept over the night well. Reports passing bowel movements. Has condom catheter on. Denies dysuria, polyuria, c/p, palpitations, abdominal pain, nausea, diarrhea, swelling.    MEDICATIONS  (STANDING):  albuterol/ipratropium for Nebulization 3 milliLiter(s) Nebulizer every 6 hours  apixaban 2.5 milliGRAM(s) Oral every 12 hours  atorvastatin 10 milliGRAM(s) Oral at bedtime  finasteride 5 milliGRAM(s) Oral every 24 hours  lactated ringers. 1000 milliLiter(s) (75 mL/Hr) IV Continuous <Continuous>  piperacillin/tazobactam IVPB.. 2.25 Gram(s) IV Intermittent every 8 hours  polyethylene glycol 3350 17 Gram(s) Oral daily  senna 2 Tablet(s) Oral at bedtime  sertraline 50 milliGRAM(s) Oral every 24 hours  tamsulosin 0.4 milliGRAM(s) Oral at bedtime    MEDICATIONS  (PRN):  acetaminophen     Tablet .. 650 milliGRAM(s) Oral every 6 hours PRN Mild Pain (1 - 3)    CAPILLARY BLOOD GLUCOSE    Vital Signs Last 24 Hrs  T(C): 36.3 (24 Aug 2022 08:37), Max: 36.7 (24 Aug 2022 05:26)  T(F): 97.4 (24 Aug 2022 08:37), Max: 98.1 (24 Aug 2022 05:26)  HR: 73 (24 Aug 2022 08:37) (70 - 80)  BP: 115/51 (24 Aug 2022 08:37) (114/59 - 122/73)  BP(mean): --  ABP: --  ABP(mean): --  RR: 18 (24 Aug 2022 08:37) (18 - 18)  SpO2: 97% (24 Aug 2022 08:37) (95% - 99%)    O2 Parameters below as of 24 Aug 2022 08:37  Patient On (Oxygen Delivery Method): room air        PHYSICAL EXAM:  General: AAOx2, NAD  Head: NC/AT; MMM; PERRL; EOMI;  Neck: Supple; no JVD  Respiratory: CTAB; no wheezes/rales/rhonchi  Cardiovascular: Regular rhythm/rate; S1/S2+, no murmurs, rubs gallops   Gastrointestinal: Soft; NT; bowel sounds normal and present  Extremities: B/L UE swelling more focused on the L side, intact peripheral pulses  Neurological: Strength and sensation intact, no obvious focal deficits  Skin: Clean and intact. Poor skin turgor. Saccral wound and R heel ulcer with pressure dressings   I&O's Summary    21 Aug 2022 07:01  -  22 Aug 2022 07:00  --------------------------------------------------------  IN: 0 mL / OUT: 700 mL / NET: -700 mL    22 Aug 2022 07:01  -  22 Aug 2022 08:41  --------------------------------------------------------  IN: 0 mL / OUT: 200 mL / NET: -200 mL        LABS:                         7.2    7.68  )-----------( 191      ( 23 Aug 2022 05:30 )             21.2     08-23    136  |  104  |  83<H>  ----------------------------<  124<H>  3.6   |  22  |  3.59<H>    Ca    8.5      23 Aug 2022 05:30  Phos  3.7     08-23  Mg     2.2     08-23    TPro  4.2<L>  /  Alb  2.0<L>  /  TBili  0.6  /  DBili  x   /  AST  48<H>  /  ALT  25  /  AlkPhos  29<L>  08-23                  RADIOLOGY, EKG & ADDITIONAL TESTS:                 RADIOLOGY & ADDITIONAL TESTS:  Imaging from Last 24 Hours:

## 2022-08-24 NOTE — PROGRESS NOTE ADULT - ASSESSMENT
ASSESSMENT/PLAN89 y/o male PMH TIA, restrictive CM, CHF, HTN, HLD, Afib (on Eliquis), CKD (baseline Scr 2.0), BPH, urinary retention, limited mobility, brought from [facility] for vomiting, hypoxia, sob, hypotension likely due to aspiration pneumonitis,  UTI, found to have SADIQ on admission.    1. O2 continue  off   2. Bronchodilators:  Atrovent/ albuterol q 4 – 6 hours as needed  3. Corticosteroids: off  4. ID/Antibiotics: Zosyn   5. Cardiac/HTN: optimize BP   6. GI: Rx/ prophylaxis c PPI/H2B  7. Heme: Rx/VT prophylaxis c SQH/SCD/AC pt on Eliquis  8. Aspiration precautions,   9. Nephrology feedback     Discussed c managing team

## 2022-08-24 NOTE — PROGRESS NOTE ADULT - PROBLEM SELECTOR PLAN 1
PMH, BPH, CKD. Presented with SADIQ on admission, BUN/Scr 98/4.26 (baseline Scr 2), recent UTI (patient was being treated with Levaquin in NH for reported UTI for 7 days starting 8.13 UCx from 8/3 with kleb pneumo. Resolved metabolic acidosis likely secondary to elevated BUN. Urology unable to place bedside Epstein, recommended cystoscopy assisted Epstein. Pt continues to decline, does not want any catheterization Resolved metabolic acidosis.  - Likely secondary to prerenal (poor PO intake) vs ATN 2/2 sepsis  - renal U/S demonstrating intraparenchymal renal disease  - urinary output last 24H: 600cc    Plan:  - LR 1000 70cc/hr, replete cautiously with strict I&O. Stop fluids if PVR>500  - bladder scan q6 to assess retention  - trend Cr, avoid nephrotoxic drugs, renally dose meds  - renal following, appreciate recs

## 2022-08-24 NOTE — PROGRESS NOTE ADULT - PROBLEM SELECTOR PLAN 2
Patient with CHF, no previous echo, BNP on admission 2189, unclear if ischemic or nonischemic etiology but patient is documented to have restrictive cardiomyopathy    Home medications Coreg 3.125mg BID, Enalapril 5mg qd, Lasix 20mg qd, Hydralazine 50mg TID.  Patient clinically euvolemic on exam.  TTE: LVEF 45-50% with global hypokinesis. Abnormal septal motion seen due to a left bundle branch block. Mild MR, mild AR, trivial PCD effusion  - Euvolemic on exam, - JVD, - b/l L/E edema, CTA b/l currently    Plan:   - strict I/Os, monitor UOP carefully

## 2022-08-24 NOTE — PROGRESS NOTE ADULT - SUBJECTIVE AND OBJECTIVE BOX
Interventional, Pulmonary, Critical, Chest Special Procedures.    Pt was seen and fully examined by myself.     Time spent with patient in minutes:37    Patient is a 89y old  Male who presents with a chief complaint of sespsis (23 Aug 2022 17:49) The patient ill appearing, albeit more engaging today and eupneic on RA    HPI:  89 y/o male PMH TIA, restrictive CM, CHF, HTN, HLD, Afib (on Eliquis), CKD (baseline Scr 2.0), BPH, urinary retention, limited mobility, brought from [facility] for concern of vomiting, hypoxia, sob, hypotension. Patient not providing history, obtained from chart and ED provider.  Patient with several episodes of NBNB vomiting since yesterday wich subsequently onset of shortness of breath and cough which has been progressively working. At baseline patient does not use any supplemental oxygen but was requiring 4L NC and was hypotensive to 100/60. Per records from [facility] no recent fever, chills, rhinorrhea, chest pain, palpitations, headaches, abdominal pain, diarrhea, constipation, dysuria, urgency, frequency.  HCP/daughter: Celina Bass [phone]    Hospital Course:  Vitals: T 97.7, HR 93, /64, RR 22, SaO2 96% 4L   Labs: WBC 13.88, Hgb 9.4, CO2 20, BUN 98, Scr 4.26, Albumin 3.0, AST 78, , Trop 0.22 --> 0.20, BNP 2189  CT Chest: 1. Possible aspiration pneumonitis worse on the right. 2. Old granulomatous disease. Mild atelectasis in the right lung base. Diffuse areas of subtle ground-glass mostly in the right lung possibly an aspiration pneumonitis given the history. Peripheral interstitial opacities possibly chronic fibrosis.  CTAP:  Multiple hepatic cysts. Gallstones with no definite evidence of acute cholecystitis. Bilateral renal cysts. Mild prominence of the right renal collecting system without obstructing stone. Colonic and duodenal diverticulosis.    EKG: sinus tachycardia with 1st degree heart block prolonged QRS  Intervention: Zofran 4mg IV, NS 1L, CTX 1g, Flagyl 500mg   Consult: none  (17 Aug 2022 04:13)      REVIEW OF SYSTEMS:  Constitutional: No fever, weight loss, chills + fatigue  Eyes: No eye pain, visual disturbances, or discharge  ENMT:  + difficulty hearing, tinnitus, vertigo; No sinus or throat pain. No epistaxis, +dysphagia, dysphonia, hoarseness no odynophagia  Neck: No pain, stiffness or neck swelling.  No masses or deformities  Respiratory: +cough, no wheezing, hemoptysis  - COPD  - ILD   - PE   - ASTHMA     - PNEUMONIA  Cardiovascular: No chest pain, AF dysrhythmia, palpitations, dizziness or edema - CAD   + CHF   + HTN  Gastrointestinal: No abdominal or epigastric pain. No nausea, vomiting or hematemesis; No diarrhea or constipation. No melena or hematochezia, Icterus.          Genitourinary: No dysuria, frequency, hematuria or incontinence   +CKD/SADIQ      - ESRD  Neurological: No headaches, memory loss, loss of strength, numbness or tremors      +DEMENTIA     - STROKE    - SEIZURE  Skin: No itching, burning, rashes or lesions   Lymph Nodes: No enlarged glands  Endocrine: No heat or cold intolerance; No hair loss       - DM     - THYROID DISORDER  Musculoskeletal: No joint pain or swelling; No muscle, back or extremity pain, No edema  Psychiatric: No depression, anxiety, mood swings or difficulty sleeping  Heme/Lymph: No easy bruising or bleeding gums         - ANEMIA      - CANCER   -COAGULOPATHY  Allergy and Immunologic: No hives or eczema    PAST MEDICAL & SURGICAL HISTORY:  Chronic CHF      Hypertension      Hyperlipidemia      Chronic kidney disease (CKD)      BPH (benign prostatic hyperplasia)      Chronic atrial fibrillation        FAMILY HISTORY:    SOCIAL HISTORY:      - Tobacco     - ETOH    Allergies    No Known Allergies    Intolerances      Vital Signs Last 24 Hrs  T(C): 36.3 (24 Aug 2022 08:37), Max: 36.7 (24 Aug 2022 05:26)  T(F): 97.4 (24 Aug 2022 08:37), Max: 98.1 (24 Aug 2022 05:26)  HR: 73 (24 Aug 2022 08:37) (70 - 80)  BP: 115/51 (24 Aug 2022 08:37) (114/59 - 122/73)  BP(mean): --  RR: 18 (24 Aug 2022 08:37) (18 - 18)  SpO2: 97% (24 Aug 2022 08:37) (95% - 99%)    Parameters below as of 24 Aug 2022 08:37  Patient On (Oxygen Delivery Method): room air        08-23 @ 07:01  -  08-24 @ 07:00  --------------------------------------------------------  IN: 490 mL / OUT: 600 mL / NET: -110 mL          MEDICATIONS:  MEDICATIONS  (STANDING):  albuterol/ipratropium for Nebulization 3 milliLiter(s) Nebulizer every 6 hours  apixaban 2.5 milliGRAM(s) Oral every 12 hours  atorvastatin 10 milliGRAM(s) Oral at bedtime  finasteride 5 milliGRAM(s) Oral every 24 hours  lactated ringers. 1000 milliLiter(s) (75 mL/Hr) IV Continuous <Continuous>  lactated ringers. 1000 milliLiter(s) (70 mL/Hr) IV Continuous <Continuous>  lactated ringers. 1000 milliLiter(s) (70 mL/Hr) IV Continuous <Continuous>  piperacillin/tazobactam IVPB.. 2.25 Gram(s) IV Intermittent every 8 hours  polyethylene glycol 3350 17 Gram(s) Oral daily  senna 2 Tablet(s) Oral at bedtime  sertraline 50 milliGRAM(s) Oral every 24 hours  tamsulosin 0.4 milliGRAM(s) Oral at bedtime    MEDICATIONS  (PRN):  acetaminophen     Tablet .. 650 milliGRAM(s) Oral every 6 hours PRN Mild Pain (1 - 3)      PHYSICAL EXAM:  Un Comfortable, no distress  Eyes: PERRL, EOM intact; conjunctiva and sclera clear  Head: Normocephalic;  No Trauma  ENMT: No nasal discharge, hoarseness, +cough no hemoptysis  Neck: Supple; non tender; no masses or deformities.    No JVD  Respiratory:   - WHEEZING   + RHONCHI  - RALES  + CRACKLES.  Diminished breath sounds  BILATERAL  RIGHT  LEFT bases   Cardiovascular: Regular rate and rhythm. S1 and S2 Normal; No murmurs, gallops or rubs     - PPM/AICD  Gastrointestinal: Soft non-tender, non-distended; Normal bowel sounds; No hepatosplenomegaly.     -PEG    -  GT   - MOMIN  Genitourinary: No costovertebral angle tenderness. No dysuria  Extremities: AROM, No clubbing, cyanosis or edema    Vascular: Peripheral pulses palpable 2+ bilaterally  Neurological: Alert and responisve to stimuli   Skin: Warm and dry. No obvious rash  Lymph Nodes: No acute cervical or supraclavicular adenopathy  Psychiatric:m not really engaging    DEVICES:  - DENTURES   +IV R / L     - ETUBE   -TRACH   -CTUBE  R / L    LABS:                          7.2    7.68  )-----------( 191      ( 23 Aug 2022 05:30 )             21.2     08-23    136  |  104  |  83<H>  ----------------------------<  124<H>  3.6   |  22  |  3.59<H>    Ca    8.5      23 Aug 2022 05:30  Phos  3.7     08-23  Mg     2.2     08-23    TPro  4.2<L>  /  Alb  2.0<L>  /  TBili  0.6  /  DBili  x   /  AST  48<H>  /  ALT  25  /  AlkPhos  29<L>  08-23      RADIOLOGY & ADDITIONAL STUDIES (The following images were personally reviewed):

## 2022-08-24 NOTE — PROGRESS NOTE ADULT - PROBLEM SELECTOR PLAN 9
RESOLVED. Patient A&Ox1 on admission, unknown baseline, unclear if there is underlying dementia of if this is an acute process.   Likely 2/2 to uremia. Patient now AAOx3  - continue to monitor          # Elevated troponin   PMH constrictive CM  Patient with troponin 0.22 on admission which trended down to 0.2, likely elevated in setting of SADIQ and CKD.   - troponin downtrending 0.2-> 0.18, no c/p, palpitations

## 2022-08-24 NOTE — PROGRESS NOTE ADULT - SUBJECTIVE AND OBJECTIVE BOX
OVERNIGHT: Patient retaining urine ON, reported two episodes of 300CC around 9PM and one closer to the morning. Patient continues to refuse any catheterization.    SUBJECTIVE: Patient reports he feels about the same. Reports breathing is well, and he slept over the night well. Reports passing bowel movements. Has condom catheter on. Denies dysuria, polyuria, c/p, palpitations, abdominal pain, nausea, diarrhea, swelling.    MEDICATIONS  (STANDING):  albuterol/ipratropium for Nebulization 3 milliLiter(s) Nebulizer every 6 hours  apixaban 2.5 milliGRAM(s) Oral every 12 hours  atorvastatin 10 milliGRAM(s) Oral at bedtime  finasteride 5 milliGRAM(s) Oral every 24 hours  lactated ringers. 1000 milliLiter(s) (75 mL/Hr) IV Continuous <Continuous>  piperacillin/tazobactam IVPB.. 2.25 Gram(s) IV Intermittent every 8 hours  polyethylene glycol 3350 17 Gram(s) Oral daily  senna 2 Tablet(s) Oral at bedtime  sertraline 50 milliGRAM(s) Oral every 24 hours  tamsulosin 0.4 milliGRAM(s) Oral at bedtime    MEDICATIONS  (PRN):  acetaminophen     Tablet .. 650 milliGRAM(s) Oral every 6 hours PRN Mild Pain (1 - 3)    CAPILLARY BLOOD GLUCOSE    Vital Signs Last 24 Hrs  T(C): 36.3 (24 Aug 2022 08:37), Max: 36.7 (24 Aug 2022 05:26)  T(F): 97.4 (24 Aug 2022 08:37), Max: 98.1 (24 Aug 2022 05:26)  HR: 73 (24 Aug 2022 08:37) (70 - 80)  BP: 115/51 (24 Aug 2022 08:37) (114/59 - 122/73)  BP(mean): --  ABP: --  ABP(mean): --  RR: 18 (24 Aug 2022 08:37) (18 - 18)  SpO2: 97% (24 Aug 2022 08:37) (95% - 99%)    O2 Parameters below as of 24 Aug 2022 08:37  Patient On (Oxygen Delivery Method): room air        PHYSICAL EXAM:  General: AAOx2, NAD  Head: NC/AT; MMM; PERRL; EOMI;  Neck: Supple; no JVD  Respiratory: CTAB; no wheezes/rales/rhonchi  Cardiovascular: Regular rhythm/rate; S1/S2+, no murmurs, rubs gallops   Gastrointestinal: Soft; NT; bowel sounds normal and present  Extremities: B/L UE swelling more focused on the L side, intact peripheral pulses  Neurological: Strength and sensation intact, no obvious focal deficits  Skin: Clean and intact. Poor skin turgor. Saccral wound and R heel ulcer with pressure dressings   I&O's Summary    21 Aug 2022 07:01  -  22 Aug 2022 07:00  --------------------------------------------------------  IN: 0 mL / OUT: 700 mL / NET: -700 mL    22 Aug 2022 07:01  -  22 Aug 2022 08:41  --------------------------------------------------------  IN: 0 mL / OUT: 200 mL / NET: -200 mL        LABS:                         7.2    7.68  )-----------( 191      ( 23 Aug 2022 05:30 )             21.2     08-23    136  |  104  |  83<H>  ----------------------------<  124<H>  3.6   |  22  |  3.59<H>    Ca    8.5      23 Aug 2022 05:30  Phos  3.7     08-23  Mg     2.2     08-23    TPro  4.2<L>  /  Alb  2.0<L>  /  TBili  0.6  /  DBili  x   /  AST  48<H>  /  ALT  25  /  AlkPhos  29<L>  08-23                  RADIOLOGY, EKG & ADDITIONAL TESTS:                 RADIOLOGY & ADDITIONAL TESTS:  Imaging from Last 24 Hours:

## 2022-08-24 NOTE — PROGRESS NOTE ADULT - ASSESSMENT
89 y/o male PMH TIA, restrictive CM, CHF, HTN, HLD, Afib (on Eliquis), CKD (baseline Scr 2.0), BPH, urinary retention, limited mobility, brought from [facility] for concern of vomiting, hypoxia, sob, hypotension likely due to aspiration pneumonia and SADIQ on CKD.  Nephrology consulted for SADIQ.        # non- oliguric SADIQ on CKD (baseline Scr 2.0)  Likely has a component of iATN  with component of obstruction  2/2 BPH.  Cr. improving, with adequate urine out put     Plan:  - pt does not want cystoscopy guided rosenbaum at this time, so any obstructive component of SADIQ may not resolve itself  - encourage PO intake  - fluids as needed as per primary team if patient not eating  - maintain MAP >70 for adequate renal perfusion  - avoid NSAIDS, PPIs and other nephrotoxic agents

## 2022-08-24 NOTE — PROGRESS NOTE ADULT - ASSESSMENT
89 y/o male PMH TIA, restrictive CM, CHF, HTN, HLD, Afib (on Eliquis), CKD (baseline Scr 2.0), BPH, urinary retention, limited mobility, brought from [facility] for concern of vomiting, hypoxia, sob, hypotension likely due to aspiration pneumonia and SADIQ on CKD. Patient medically stable.

## 2022-08-24 NOTE — PROGRESS NOTE ADULT - ASSESSMENT
89 y/o male PMH TIA, restrictive CM, CHF, HTN, HLD, Afib (on Eliquis), CKD (baseline Scr 2.0), BPH, urinary retention, limited mobility, brought from [facility] for concern of vomiting, hypoxia, sob, hypotension likely due to aspiration pneumonia and SADIQ on CKD. Patient medically stable.  Cont Zosyn  Inc flomax 0.4  to two po daily  OOB  CV stable.  Refusing care at times  Cr up 3.50--f/u in AM

## 2022-08-24 NOTE — PROGRESS NOTE ADULT - SUBJECTIVE AND OBJECTIVE BOX
Patient is a 89y Male seen and evaluated at bedside. Patient in no acute distress, more awake and alert this morning. VSS, kidney function improving.       Meds:    acetaminophen     Tablet .. 650 every 6 hours PRN  albuterol/ipratropium for Nebulization 3 every 6 hours  apixaban 2.5 every 12 hours  atorvastatin 10 at bedtime  finasteride 5 every 24 hours  lactated ringers. 1000 <Continuous>  lactated ringers. 1000 <Continuous>  lactated ringers. 1000 <Continuous>  piperacillin/tazobactam IVPB.. 2.25 every 8 hours  polyethylene glycol 3350 17 daily  senna 2 at bedtime  sertraline 50 every 24 hours  tamsulosin 0.4 at bedtime      T(C): , Max: 36.7 (08-24-22 @ 05:26)  T(F): , Max: 98.1 (08-24-22 @ 05:26)  HR: 73 (08-24-22 @ 08:37)  BP: 115/51 (08-24-22 @ 08:37)  BP(mean): --  RR: 18 (08-24-22 @ 08:37)  SpO2: 97% (08-24-22 @ 08:37)  Wt(kg): --    08-23 @ 07:01  -  08-24 @ 07:00  --------------------------------------------------------  IN: 490 mL / OUT: 600 mL / NET: -110 mL    08-24 @ 07:01  -  08-24 @ 11:20  --------------------------------------------------------  IN: 280 mL / OUT: 0 mL / NET: 280 mL          Review of Systems:  ROS negative except as per HPI      PHYSICAL EXAM:  GENERAL: NAD, resting comfortably in bed having breakfast   NECK: supple, No JVD  CHEST/LUNG: Clear to auscultation bilaterally  HEART: normal S1S2, RRR  ABDOMEN: Soft, Nontender, +BS, No flank tenderness bilateral  EXTREMITIES: No clubbing, cyanosis, b/L UE edema   NEUROLOGY: AAO x2, no focal neurological deficit        LABS:                        7.2    7.68  )-----------( 191      ( 23 Aug 2022 05:30 )             21.2     08-23    136  |  104  |  83<H>  ----------------------------<  124<H>  3.6   |  22  |  3.59<H>    Ca    8.5      23 Aug 2022 05:30  Phos  3.7     08-23  Mg     2.2     08-23    TPro  4.2<L>  /  Alb  2.0<L>  /  TBili  0.6  /  DBili  x   /  AST  48<H>  /  ALT  25  /  AlkPhos  29<L>  08-23                RADIOLOGY & ADDITIONAL STUDIES:

## 2022-08-24 NOTE — PROGRESS NOTE ADULT - PROBLEM SELECTOR PLAN 4
Patient with recent cough, sob, hypoxia at [facility] with evidence of aspiration pneumonitis, likely in setting of vomiting.  Patient currently continues to cough but no longer hypoxic, saturating 98% on RA  No infiltrates or consolidations on imaging.     Plan:  - S&S cleared for minced and moist with thin liquids, tolerating diet well  - Maintain aspiration precautions

## 2022-08-25 NOTE — PROGRESS NOTE ADULT - PROBLEM SELECTOR PLAN 3
Patient meeting two SIRS critera HR 93 WBC 13.88 on admission possible sources aspiration pneumonitis and UTI.  Patient has UCx positive for the kleb pnuemo on 8/3 and was being treated with Levaquin since 8/13.  Abdominal source unlikely patient without diarrhea, no evidence of infection on CTAP.  Patient remains afebrile, hemodynamically stable   - improving leukocytosis, procal 1.94    - c/w zosyn 2.25 q8 (8/18-8/25) adjusted for age   - Bcx NTD  - F/u UA/ Ucx unable to straight cath   - c/w vest PT BID Patient meeting two SIRS critera HR 93 WBC 13.88 on admission possible sources aspiration pneumonitis and UTI.  Patient has UCx positive for the kleb pnuemo on 8/3 and was being treated with Levaquin since 8/13.  Abdominal source unlikely patient without diarrhea, no evidence of infection on CTAP.  Patient remains afebrile, hemodynamically stable   - improving leukocytosis, procal 1.94    - Finished zosyn course today  - Bcx NTD  - c/w vest PT BID

## 2022-08-25 NOTE — PROGRESS NOTE ADULT - PROBLEM SELECTOR PLAN 10
F: LR 70cc/hr  E: Replete K>4, Mg>2  N: Diet Minced and Soft    DVT prophylaxis: Elliquis 2.5mg BID F: Off fluids for now  E: Replete K>4, Mg>2  N: Diet Minced and Soft    DVT prophylaxis: Elliquis 2.5mg BID

## 2022-08-25 NOTE — PROGRESS NOTE ADULT - PROVIDER SPECIALTY LIST ADULT
Internal Medicine
Nephrology
Nephrology
Pulmonology
Nephrology
Pulmonology
Pulmonology
Internal Medicine
Nephrology
Nephrology
Pulmonology
Rehab Medicine
Internal Medicine

## 2022-08-25 NOTE — PROGRESS NOTE ADULT - PROBLEM SELECTOR PLAN 4
Patient with recent cough, sob, hypoxia at [facility] with evidence of aspiration pneumonitis, likely in setting of vomiting.  Patient currently continues to cough but no longer hypoxic, saturating 98% on RA  No infiltrates or consolidations on imaging.     Plan:  - S&S cleared for minced and moist with thin liquids, tolerating diet well  - Maintain aspiration precautions

## 2022-08-25 NOTE — PROGRESS NOTE ADULT - ASSESSMENT
89 y/o male PMH TIA, restrictive CM, CHF, HTN, HLD, Afib (on Eliquis), CKD (baseline Scr 2.0), BPH, urinary retention, limited mobility, brought from [facility] for concern of vomiting, hypoxia, sob, hypotension likely due to aspiration pneumonia and SADIQ on CKD.  Nephrology consulted for SADIQ.        # non- oliguric SADIQ on CKD (baseline Scr 2.0)  Likely has a component of iATN  with component of obstruction  2/2 BPH.  Cr. improving, with adequate urine out put     Plan:  - encourage PO intake  - fluids as needed as per primary team if patient not eating  - maintain MAP >70 for adequate renal perfusion  - avoid NSAIDS, PPIs and other nephrotoxic agents  -consider b/l UE venous doppler

## 2022-08-25 NOTE — PROGRESS NOTE ADULT - SUBJECTIVE AND OBJECTIVE BOX
Patient is a 89y Male seen and evaluated at bedside. Patient in no acute distress and does not offer any complaints. Kidney function improving.       Meds:    acetaminophen     Tablet .. 650 every 6 hours PRN  albuterol/ipratropium for Nebulization 3 every 6 hours  apixaban 2.5 every 12 hours  atorvastatin 10 at bedtime  finasteride 5 every 24 hours  lactated ringers. 1000 <Continuous>  lactated ringers. 1000 <Continuous>  lactated ringers. 1000 <Continuous>  sertraline 50 every 24 hours  tamsulosin 0.4 at bedtime      T(C): , Max: 36.8 (08-25-22 @ 05:30)  T(F): , Max: 98.3 (08-25-22 @ 05:30)  HR: 83 (08-25-22 @ 08:34)  BP: 121/70 (08-25-22 @ 08:34)  BP(mean): --  RR: 18 (08-25-22 @ 08:34)  SpO2: 99% (08-25-22 @ 08:34)  Wt(kg): --    08-24 @ 07:01  -  08-25 @ 07:00  --------------------------------------------------------  IN: 660 mL / OUT: 500 mL / NET: 160 mL          Review of Systems:  ROS negative except as per HPI      PHYSICAL EXAM:  GENERAL: NAD, resting comfortably in bed   NECK: supple, No JVD  CHEST/LUNG: Clear to auscultation bilaterally  HEART: normal S1S2, RRR  ABDOMEN: Soft, Nontender, +BS, No flank tenderness bilateral  EXTREMITIES: No clubbing, cyanosis, b/L UE edema   NEUROLOGY: AAO x2, no focal neurological deficit      LABS:                        7.6    9.42  )-----------( 229      ( 25 Aug 2022 06:03 )             23.6     08-25    133<L>  |  102  |  75<H>  ----------------------------<  97  3.6   |  20<L>  |  3.22<H>    Ca    8.6      25 Aug 2022 06:03  Phos  3.2     08-25  Mg     2.2     08-25    TPro  4.6<L>  /  Alb  2.1<L>  /  TBili  0.8  /  DBili  x   /  AST  41<H>  /  ALT  23  /  AlkPhos  36<L>  08-25                RADIOLOGY & ADDITIONAL STUDIES:

## 2022-08-25 NOTE — PROGRESS NOTE ADULT - PROBLEM SELECTOR PROBLEM 1
Sepsis
Sepsis
Acute kidney injury superimposed on CKD

## 2022-08-25 NOTE — PROGRESS NOTE ADULT - SUBJECTIVE AND OBJECTIVE BOX
Interventional, Pulmonary, Critical, Chest Special Procedures.    Pt was seen and fully examined by myself.     Time spent with patient in minutes:35    Patient is a 89y old  Male who presents with a chief complaint of sepsis (25 Aug 2022 08:08) The patient c no new complaints, refuses catheterization.    HPI:  89 y/o male PMH TIA, restrictive CM, CHF, HTN, HLD, Afib (on Eliquis), CKD (baseline Scr 2.0), BPH, urinary retention, limited mobility, brought from [facility] for concern of vomiting, hypoxia, sob, hypotension. Patient not providing history, obtained from chart and ED provider.  Patient with several episodes of NBNB vomiting since yesterday wich subsequently onset of shortness of breath and cough which has been progressively working. At baseline patient does not use any supplemental oxygen but was requiring 4L NC and was hypotensive to 100/60. Per records from [facility] no recent fever, chills, rhinorrhea, chest pain, palpitations, headaches, abdominal pain, diarrhea, constipation, dysuria, urgency, frequency.  HCP/daughter: Celina Bass [phone]    Hospital Course:  Vitals: T 97.7, HR 93, /64, RR 22, SaO2 96% 4L   Labs: WBC 13.88, Hgb 9.4, CO2 20, BUN 98, Scr 4.26, Albumin 3.0, AST 78, , Trop 0.22 --> 0.20, BNP 2189  CT Chest: 1. Possible aspiration pneumonitis worse on the right. 2. Old granulomatous disease. Mild atelectasis in the right lung base. Diffuse areas of subtle ground-glass mostly in the right lung possibly an aspiration pneumonitis given the history. Peripheral interstitial opacities possibly chronic fibrosis.  CTAP:  Multiple hepatic cysts. Gallstones with no definite evidence of acute cholecystitis. Bilateral renal cysts. Mild prominence of the right renal collecting system without obstructing stone. Colonic and duodenal diverticulosis.    EKG: sinus tachycardia with 1st degree heart block prolonged QRS  Intervention: Zofran 4mg IV, NS 1L, CTX 1g, Flagyl 500mg   Consult: none  (17 Aug 2022 04:13)      REVIEW OF SYSTEMS:  Constitutional: No fever, weight loss, chills + fatigue  Eyes: No eye pain, visual disturbances, or discharge  ENMT:  + difficulty hearing, tinnitus, vertigo; No sinus or throat pain. No epistaxis, +dysphagia, dysphonia, hoarseness no odynophagia  Neck: No pain, stiffness or neck swelling.  No masses or deformities  Respiratory: +cough, no wheezing, hemoptysis  - COPD  - ILD   - PE   - ASTHMA     - PNEUMONIA  Cardiovascular: No chest pain, AF dysrhythmia, palpitations, dizziness or edema - CAD   + CHF   + HTN  Gastrointestinal: No abdominal or epigastric pain. No nausea, vomiting or hematemesis; No diarrhea or constipation. No melena or hematochezia, Icterus.          Genitourinary: No dysuria, frequency, hematuria or incontinence   +CKD/SADIQ      - ESRD  Neurological: No headaches, memory loss, loss of strength, numbness or tremors      +DEMENTIA     - STROKE    - SEIZURE  Skin: No itching, burning, rashes or lesions   Lymph Nodes: No enlarged glands  Endocrine: No heat or cold intolerance; No hair loss       - DM     - THYROID DISORDER  Musculoskeletal: No joint pain or swelling; No muscle, back or extremity pain, No edema  Psychiatric: No depression, anxiety, mood swings or difficulty sleeping  Heme/Lymph: No easy bruising or bleeding gums         - ANEMIA      - CANCER   -COAGULOPATHY  Allergy and Immunologic: No hives or eczema  PAST MEDICAL & SURGICAL HISTORY:  Chronic CHF      Hypertension      Hyperlipidemia      Chronic kidney disease (CKD)      BPH (benign prostatic hyperplasia)      Chronic atrial fibrillation        FAMILY HISTORY:    SOCIAL HISTORY:      - Tobacco     - ETOH    Allergies    No Known Allergies    Intolerances      Vital Signs Last 24 Hrs  T(C): 36.3 (25 Aug 2022 08:34), Max: 36.8 (25 Aug 2022 05:30)  T(F): 97.4 (25 Aug 2022 08:34), Max: 98.3 (25 Aug 2022 05:30)  HR: 83 (25 Aug 2022 08:34) (83 - 95)  BP: 121/70 (25 Aug 2022 08:34) (96/54 - 139/72)  BP(mean): --  RR: 18 (25 Aug 2022 08:34) (18 - 18)  SpO2: 99% (25 Aug 2022 08:34) (95% - 99%)    Parameters below as of 25 Aug 2022 08:34  Patient On (Oxygen Delivery Method): room air        08-24 @ 07:01  -  08-25 @ 07:00  --------------------------------------------------------  IN: 660 mL / OUT: 500 mL / NET: 160 mL          MEDICATIONS:  MEDICATIONS  (STANDING):  albuterol/ipratropium for Nebulization 3 milliLiter(s) Nebulizer every 6 hours  apixaban 2.5 milliGRAM(s) Oral every 12 hours  atorvastatin 10 milliGRAM(s) Oral at bedtime  finasteride 5 milliGRAM(s) Oral every 24 hours  lactated ringers. 1000 milliLiter(s) (70 mL/Hr) IV Continuous <Continuous>  lactated ringers. 1000 milliLiter(s) (75 mL/Hr) IV Continuous <Continuous>  lactated ringers. 1000 milliLiter(s) (70 mL/Hr) IV Continuous <Continuous>  sertraline 50 milliGRAM(s) Oral every 24 hours  tamsulosin 0.4 milliGRAM(s) Oral at bedtime    MEDICATIONS  (PRN):  acetaminophen     Tablet .. 650 milliGRAM(s) Oral every 6 hours PRN Mild Pain (1 - 3)      PHYSICAL EXAM:  Un Comfortable, no distress  Eyes: PERRL, EOM intact; conjunctiva and sclera clear  Head: Normocephalic;  No Trauma  ENMT: No nasal discharge, hoarseness, +cough no hemoptysis  Neck: Supple; non tender; no masses or deformities.    No JVD  Respiratory:   - WHEEZING   + RHONCHI  - RALES  + CRACKLES.  Diminished breath sounds  BILATERAL  RIGHT  LEFT bases   Cardiovascular: Regular rate and rhythm. S1 and S2 Normal; No murmurs, gallops or rubs     - PPM/AICD  Gastrointestinal: Soft non-tender, non-distended; Normal bowel sounds; No hepatosplenomegaly.     -PEG    -  GT   - MOMIN  Genitourinary: No costovertebral angle tenderness. No dysuria  Extremities: AROM, No clubbing, cyanosis or edema    Vascular: Peripheral pulses palpable 2+ bilaterally  Neurological: Alert and responisve to stimuli   Skin: Warm and dry. No obvious rash  Lymph Nodes: No acute cervical or supraclavicular adenopathy  Psychiatric:m not really engaging    DEVICES:  - DENTURES   +IV R / L     - ETUBE   -TRACH   -CTUBE  R / L    LABS:                          7.6    9.42  )-----------( 229      ( 25 Aug 2022 06:03 )             23.6     08-25    133<L>  |  102  |  75<H>  ----------------------------<  97  3.6   |  20<L>  |  3.22<H>    Ca    8.6      25 Aug 2022 06:03  Phos  3.2     08-25  Mg     2.2     08-25    TPro  4.6<L>  /  Alb  2.1<L>  /  TBili  0.8  /  DBili  x   /  AST  41<H>  /  ALT  23  /  AlkPhos  36<L>  08-25      RADIOLOGY & ADDITIONAL STUDIES (The following images were personally reviewed):

## 2022-08-25 NOTE — PROGRESS NOTE ADULT - SUBJECTIVE AND OBJECTIVE BOX
OVERNIGHT: Patient retaining urine ON, reported two episodes of 300CC around 9PM and one closer to the morning. Patient continues to refuse any catheterization.    SUBJECTIVE: Patient reports he feels about the same. Reports breathing is well, and he slept over the night well. Reports passing bowel movements. Has condom catheter on. Denies dysuria, polyuria, c/p, palpitations, abdominal pain, nausea, diarrhea, swelling.    MEDICATIONS  (STANDING):  albuterol/ipratropium for Nebulization 3 milliLiter(s) Nebulizer every 6 hours  apixaban 2.5 milliGRAM(s) Oral every 12 hours  atorvastatin 10 milliGRAM(s) Oral at bedtime  finasteride 5 milliGRAM(s) Oral every 24 hours  lactated ringers. 1000 milliLiter(s) (75 mL/Hr) IV Continuous <Continuous>  piperacillin/tazobactam IVPB.. 2.25 Gram(s) IV Intermittent every 8 hours  polyethylene glycol 3350 17 Gram(s) Oral daily  senna 2 Tablet(s) Oral at bedtime  sertraline 50 milliGRAM(s) Oral every 24 hours  tamsulosin 0.4 milliGRAM(s) Oral at bedtime    MEDICATIONS  (PRN):  acetaminophen     Tablet .. 650 milliGRAM(s) Oral every 6 hours PRN Mild Pain (1 - 3)    CAPILLARY BLOOD GLUCOSE    Vital Signs Last 24 Hrs  T(C): 36.3 (24 Aug 2022 08:37), Max: 36.7 (24 Aug 2022 05:26)  T(F): 97.4 (24 Aug 2022 08:37), Max: 98.1 (24 Aug 2022 05:26)  HR: 73 (24 Aug 2022 08:37) (70 - 80)  BP: 115/51 (24 Aug 2022 08:37) (114/59 - 122/73)  BP(mean): --  ABP: --  ABP(mean): --  RR: 18 (24 Aug 2022 08:37) (18 - 18)  SpO2: 97% (24 Aug 2022 08:37) (95% - 99%)    O2 Parameters below as of 24 Aug 2022 08:37  Patient On (Oxygen Delivery Method): room air        PHYSICAL EXAM:  General: AAOx2, NAD  Head: NC/AT; MMM; PERRL; EOMI;  Neck: Supple; no JVD  Respiratory: CTAB; no wheezes/rales/rhonchi  Cardiovascular: Regular rhythm/rate; S1/S2+, no murmurs, rubs gallops   Gastrointestinal: Soft; NT; bowel sounds normal and present  Extremities: B/L UE swelling more focused on the L side, intact peripheral pulses  Neurological: Strength and sensation intact, no obvious focal deficits  Skin: Clean and intact. Poor skin turgor. Saccral wound and R heel ulcer with pressure dressings   I&O's Summary    21 Aug 2022 07:01  -  22 Aug 2022 07:00  --------------------------------------------------------  IN: 0 mL / OUT: 700 mL / NET: -700 mL    22 Aug 2022 07:01  -  22 Aug 2022 08:41  --------------------------------------------------------  IN: 0 mL / OUT: 200 mL / NET: -200 mL        LABS:                         7.2    7.68  )-----------( 191      ( 23 Aug 2022 05:30 )             21.2     08-23    136  |  104  |  83<H>  ----------------------------<  124<H>  3.6   |  22  |  3.59<H>    Ca    8.5      23 Aug 2022 05:30  Phos  3.7     08-23  Mg     2.2     08-23    TPro  4.2<L>  /  Alb  2.0<L>  /  TBili  0.6  /  DBili  x   /  AST  48<H>  /  ALT  25  /  AlkPhos  29<L>  08-23                  RADIOLOGY, EKG & ADDITIONAL TESTS:                 RADIOLOGY & ADDITIONAL TESTS:  Imaging from Last 24 Hours: OVERNIGHT: Patient r  SUBJECTIVE: Patient reports he feels about the same. Reports breathing is well, and he slept over the night well. Reports passing bowel movements. Has condom catheter on. Denies dysuria, polyuria, c/p, palpitations, abdominal pain, nausea, diarrhea, swelling.    MEDICATIONS  (STANDING):  albuterol/ipratropium for Nebulization 3 milliLiter(s) Nebulizer every 6 hours  apixaban 2.5 milliGRAM(s) Oral every 12 hours  atorvastatin 10 milliGRAM(s) Oral at bedtime  finasteride 5 milliGRAM(s) Oral every 24 hours  lactated ringers. 1000 milliLiter(s) (75 mL/Hr) IV Continuous <Continuous>  piperacillin/tazobactam IVPB.. 2.25 Gram(s) IV Intermittent every 8 hours  polyethylene glycol 3350 17 Gram(s) Oral daily  senna 2 Tablet(s) Oral at bedtime  sertraline 50 milliGRAM(s) Oral every 24 hours  tamsulosin 0.4 milliGRAM(s) Oral at bedtime    MEDICATIONS  (PRN):  acetaminophen     Tablet .. 650 milliGRAM(s) Oral every 6 hours PRN Mild Pain (1 - 3)    CAPILLARY BLOOD GLUCOSE    Vital Signs Last 24 Hrs  T(C): 36.3 (24 Aug 2022 08:37), Max: 36.7 (24 Aug 2022 05:26)  T(F): 97.4 (24 Aug 2022 08:37), Max: 98.1 (24 Aug 2022 05:26)  HR: 73 (24 Aug 2022 08:37) (70 - 80)  BP: 115/51 (24 Aug 2022 08:37) (114/59 - 122/73)  BP(mean): --  ABP: --  ABP(mean): --  RR: 18 (24 Aug 2022 08:37) (18 - 18)  SpO2: 97% (24 Aug 2022 08:37) (95% - 99%)    O2 Parameters below as of 24 Aug 2022 08:37  Patient On (Oxygen Delivery Method): room air        PHYSICAL EXAM:  General: AAOx2, NAD  Head: NC/AT; MMM; PERRL; EOMI;  Neck: Supple; no JVD  Respiratory: CTAB; no wheezes/rales/rhonchi  Cardiovascular: Regular rhythm/rate; S1/S2+, no murmurs, rubs gallops   Gastrointestinal: Soft; NT; bowel sounds normal and present  Extremities: B/L UE swelling more focused on the L side, intact peripheral pulses  Neurological: Strength and sensation intact, no obvious focal deficits  Skin: Clean and intact. Poor skin turgor. Saccral wound and R heel ulcer with pressure dressings   I&O's Summary    21 Aug 2022 07:01  -  22 Aug 2022 07:00  --------------------------------------------------------  IN: 0 mL / OUT: 700 mL / NET: -700 mL    22 Aug 2022 07:01  -  22 Aug 2022 08:41  --------------------------------------------------------  IN: 0 mL / OUT: 200 mL / NET: -200 mL        LABS:                         7.2    7.68  )-----------( 191      ( 23 Aug 2022 05:30 )             21.2     08-23    136  |  104  |  83<H>  ----------------------------<  124<H>  3.6   |  22  |  3.59<H>    Ca    8.5      23 Aug 2022 05:30  Phos  3.7     08-23  Mg     2.2     08-23    TPro  4.2<L>  /  Alb  2.0<L>  /  TBili  0.6  /  DBili  x   /  AST  48<H>  /  ALT  25  /  AlkPhos  29<L>  08-23                  RADIOLOGY, EKG & ADDITIONAL TESTS:                 RADIOLOGY & ADDITIONAL TESTS:  Imaging from Last 24 Hours: OVERNIGHT: Patient not retaining overnight. Urinated 500cc yesterday.    SUBJECTIVE: Patient reports he feels about the same. Reports breathing is well, and he slept over the night well. Reports passing bowel movements. Has condom catheter on. Denies dysuria, polyuria, c/p, palpitations, abdominal pain, nausea, diarrhea, swelling.    MEDICATIONS  (STANDING):  albuterol/ipratropium for Nebulization 3 milliLiter(s) Nebulizer every 6 hours  apixaban 2.5 milliGRAM(s) Oral every 12 hours  atorvastatin 10 milliGRAM(s) Oral at bedtime  finasteride 5 milliGRAM(s) Oral every 24 hours  lactated ringers. 1000 milliLiter(s) (75 mL/Hr) IV Continuous <Continuous>  piperacillin/tazobactam IVPB.. 2.25 Gram(s) IV Intermittent every 8 hours  polyethylene glycol 3350 17 Gram(s) Oral daily  senna 2 Tablet(s) Oral at bedtime  sertraline 50 milliGRAM(s) Oral every 24 hours  tamsulosin 0.4 milliGRAM(s) Oral at bedtime    MEDICATIONS  (PRN):  acetaminophen     Tablet .. 650 milliGRAM(s) Oral every 6 hours PRN Mild Pain (1 - 3)    CAPILLARY BLOOD GLUCOSE    Vital Signs Last 24 Hrs  T(C): 36.3 (25 Aug 2022 08:34), Max: 36.8 (25 Aug 2022 05:30)  T(F): 97.4 (25 Aug 2022 08:34), Max: 98.3 (25 Aug 2022 05:30)  HR: 83 (25 Aug 2022 08:34) (83 - 95)  BP: 121/70 (25 Aug 2022 08:34) (96/54 - 139/72)  BP(mean): --  ABP: --  ABP(mean): --  RR: 18 (25 Aug 2022 08:34) (18 - 18)  SpO2: 99% (25 Aug 2022 08:34) (95% - 99%)        PHYSICAL EXAM:  General: AAOx1, pleasant, NAD  Head: NC/AT; MMM; PERRL; EOMI;  Neck: Supple; no JVD  Respiratory: CTAB; no wheezes/rales/rhonchi  Cardiovascular: Regular rhythm/rate; S1/S2+, no murmurs, rubs gallops   Gastrointestinal: Soft; NT; bowel sounds normal and present  Extremities: B/L UE swelling more focused on the L side, intact peripheral pulses  Neurological: Strength and sensation intact, no obvious focal deficits  Skin: Clean and intact. Poor skin turgor. Sacral wound and R heel ulcer with pressure dressings       O2 Parameters below as of 25 Aug 2022 08:34  LABS:                         7.6    9.42  )-----------( 229      ( 25 Aug 2022 06:03 )             23.6     08-25    133<L>  |  102  |  75<H>  ----------------------------<  97  3.6   |  20<L>  |  3.22<H>    Ca    8.6      25 Aug 2022 06:03  Phos  3.2     08-25  Mg     2.2     08-25    TPro  4.6<L>  /  Alb  2.1<L>  /  TBili  0.8  /  DBili  x   /  AST  41<H>  /  ALT  23  /  AlkPhos  36<L>  08-25                  RADIOLOGY, EKG & ADDITIONAL TESTS: Patient On (Oxygen Delivery Method): room air

## 2022-08-25 NOTE — PROGRESS NOTE ADULT - ASSESSMENT
ASSESSMENT/PLAN89 y/o male PMH TIA, restrictive CM, CHF, HTN, HLD, Afib (on Eliquis), CKD (baseline Scr 2.0), BPH, urinary retention, limited mobility, brought from [facility] for vomiting, hypoxia, sob, hypotension likely due to aspiration pneumonitis,  UTI, found to have SADIQ on admission.    1. O2 continue  off   2. Bronchodilators:  Atrovent/ albuterol q 4 – 6 hours as needed  3. Corticosteroids: off  4. ID/Antibiotics: Zosyn   5. Cardiac/HTN: optimize BP   6. GI: Rx/ prophylaxis c PPI/H2B  7. Heme: Rx/VT prophylaxis c SQH/SCD/AC pt on Eliquis  8. Aspiration precautions,   9. Nephrology feedback     Discussed c managing team

## 2022-08-25 NOTE — PROGRESS NOTE ADULT - NUTRITIONAL ASSESSMENT
as tolerated
as tolerated
Minced and moist diet
as tolerated
as tolerated
Minced and moist diet
Minced and moist diet

## 2022-08-25 NOTE — PROGRESS NOTE ADULT - ASSESSMENT
89 y/o male PMH TIA, restrictive CM, CHF, HTN, HLD, Afib (on Eliquis), CKD (baseline Scr 2.0), BPH, urinary retention, limited mobility, brought from [facility] for concern of vomiting, hypoxia, sob, hypotension likely due to aspiration pneumonia and SADIQ on CKD. Patient medically stable.       89 y/o male PMH TIA, restrictive CM, CHF, HTN, HLD, Afib (on Eliquis), CKD (baseline Scr 2.0), BPH, urinary retention, limited mobility, brought from [facility] for concern of vomiting, hypoxia, sob, hypotension likely due to aspiration pneumonia and SADIQ on CKD. Patient medically stable for discharge.

## 2022-08-25 NOTE — PROGRESS NOTE ADULT - PROBLEM SELECTOR PLAN 1
PMH, BPH, CKD. Presented with SADIQ on admission, BUN/Scr 98/4.26 (baseline Scr 2), recent UTI (patient was being treated with Levaquin in NH for reported UTI for 7 days starting 8.13 UCx from 8/3 with kleb pneumo. Resolved metabolic acidosis likely secondary to elevated BUN. Urology unable to place bedside Epstein, recommended cystoscopy assisted Epstein. Pt continues to decline, does not want any catheterization Resolved metabolic acidosis.  - Likely secondary to prerenal (poor PO intake) vs ATN 2/2 sepsis  - renal U/S demonstrating intraparenchymal renal disease  - urinary output last 24H: 600cc    Plan:  - LR 1000 70cc/hr, replete cautiously with strict I&O. Stop fluids if PVR>500  - bladder scan q6 to assess retention  - trend Cr, avoid nephrotoxic drugs, renally dose meds  - renal following, appreciate recs PMH, BPH, CKD. Presented with SADIQ on admission, BUN/Scr 98/4.26 (baseline Scr 2), recent UTI (patient was being treated with Levaquin in NH for reported UTI for 7 days starting 8.13 UCx from 8/3 with kleb pneumo. Resolved metabolic acidosis likely secondary to elevated BUN. Urology unable to place bedside Epstein, recommended cystoscopy assisted Epstein. Pt continues to decline, does not want any catheterization. Resolved metabolic acidosis.  - Likely secondary to prerenal (poor PO intake) vs ATN 2/2 sepsis. Renal function improving,  - renal U/S demonstrating intraparenchymal renal disease  - urinary output last 24H: 500CC, not retaining    Plan:  - Off fluids for now, not retaining overnight  - trend Cr, avoid nephrotoxic drugs, renally dose meds  - renal following, appreciate recs

## 2022-08-25 NOTE — PROGRESS NOTE ADULT - ATTENDING COMMENTS
have spoken with pt at length who is adamant that he woud never consent to any uroogic procedure to improve bladdder drainage.    but his volume status is s/w improved and his uo is better.  creat slowly improving.  will continue supportive care as c/w his agreed level of intervention.   discussed with jones team in detail .  agree with findings and recommendations.
have reviewed and discussed status.  pt resting comfortably, and has continued to decline any interventions not administered until this time.   vs stable, pt comfortable, and labs are slowly better.  will continue current approach.   have reviewed above findings and agree with plan.
have reviewed status and discussed with pt and with jones team --- agree with findings and recommendations.
improving nonoliguric SADIQ with fluids
have reviewed above, and examined pt and discussed options for therapy yet again.   pt does want things done for him, but continues to refuse placement of catheter because he had prior bad experience with it.    pt alert and awake, eating, denies pain or sob,  agree with findings and recommendations.

## 2022-08-26 PROBLEM — Z00.00 ENCOUNTER FOR PREVENTIVE HEALTH EXAMINATION: Status: ACTIVE | Noted: 2022-01-01

## 2022-09-04 NOTE — ED ADULT TRIAGE NOTE - TEMPERATURE IN CELSIUS (DEGREES C)
Spoke to patients aida Alcantar  36.7

## 2022-09-04 NOTE — ED ADULT NURSE REASSESSMENT NOTE - NS ED NURSE REASSESS COMMENT FT1
Patient endorsed from prior shift, patient was sent for low hgb from NH, here patient noted to be at baseline hgb. Pt os awake and resting on strethcer. Pt's VSS, and has been discharged, awaiting transportation back to NH. No acute distress noted.

## 2022-09-04 NOTE — ED PROVIDER NOTE - PATIENT PORTAL LINK FT
You can access the FollowMyHealth Patient Portal offered by [facility] by registering at the following website: [email or web address]. By joining [facility]’s FollowMyHealth portal, you will also be able to view your health information using other applications (apps) compatible with our system.

## 2022-09-04 NOTE — ED ADULT NURSE NOTE - CAS TRG GEN SKIN CONDITION
Chief Complaint   Patient presents with     Follow Up     new heart transplant      Vitals were taken and medications were reconciled.    Darya Moulton RMA  8:37 AM     Warm/Dry

## 2022-09-04 NOTE — ED PROVIDER NOTE - PHYSICAL EXAMINATION
CONSTITUTIONAL: Awake, alert and in no apparent distress.  HEENT: Head is atraumatic. Eyes clear bilaterally, normal EOMI. Airway patent.  CARDIAC: Normal rate, regular rhythm.  Heart sounds S1, S2.   RESPIRATORY: Breath sounds clear and equal bilaterally. no tachypnea, respiratory distress.   GASTROINTESTINAL: Abdomen soft, non-tender, no guarding, distension. Brown stool on rectal exam.   MUSCULOSKELETAL: Spine appears normal, no midline spinal tenderness, range of motion is not limited, no muscle or joint tenderness. no bony tenderness.   NEUROLOGICAL: Alert, no focal deficits, no motor or sensory deficits.  SKIN: Skin normal color for race, warm, dry and intact. No evidence of rash.  PSYCHIATRIC: Normal mood and affect. no apparent risk to self or others.

## 2022-09-04 NOTE — ED PROVIDER NOTE - OBJECTIVE STATEMENT
89 y/o M, PMHx of TIA, restrictive CM, CHF, HTN, HLD, Afib (on Eliquis), CKD (baseline Scr 2.0), BPH, urinary retention, limited mobility, now coming to ED with concern for anemia. Routine outpatient labs showing Hgb of 6.4. Pt is poor historian. Does not have any other complaints. Pt denies n/v/d, chest pain, SOB, and weakness. 89 y/o M, PMHx of TIA, restrictive CM, CHF, HTN, HLD, Afib (on Eliquis), CKD (baseline Scr 2.0), BPH, urinary retention, limited mobility, now coming to ED with concern for anemia. Routine outpatient labs showing Hgb of 6.4. Pt is poor historian. Does not have any other complaints. Pt denies n/v/d, chest pain, SOB, and weakness, GI bleed sx, last dc showing Hb 7.6. DNR/DNI on MOLST.

## 2022-09-04 NOTE — ED PROVIDER NOTE - CLINICAL SUMMARY MEDICAL DECISION MAKING FREE TEXT BOX
Pt here for concern for anemia. Labs showing 7.6 Hgb. VS are stable. No GI bleed sxs. At baseline hgb near 7. Disc w/ Dr. Rebolledo. Will dc to [facility]. Pt here for concern for anemia. Labs showing 7.6 Hgb. VS are stable. No GI bleed sxs. At baseline hgb near 7 when reviewed prior. Disc w/ Dr. Rebolledo. Will dc to [facility]. No other resp sx, abd pain, fever, n/v or any other acute complaints.

## 2022-09-04 NOTE — ED PROVIDER NOTE - HISTORY ATTESTATION, MLM
Got COVID vaccine on 7/29.  He is in [place] now.  Reports chest tightness since Saturday.  He says the feeling is constant.  He is using his Flovent but has not tried his albuterol.  Hx of asthma.  He denies any chest pain, and says it feels like he is SOB.  He is able to do his normal daily activities and has no other symptoms of being sick.      Denies gasping for air, dizziness, weakness, fever, chills, coughing, chest pain, wheezing, stridor, blue lips or face, LOC, cold/ clammy/ pale skin, fainting, visible sweat on face, cold symptoms, cough, NV, diarrhea, rash, back or abdominal pain, tongue or lip swelling, difficulty swallowing.      Per protocol, to be seen for immediate office visit.    He agrees to be seen locally in the  if symptoms do not resolve.  Lang is going to try using his albuterol inhaler and see if that helps.    Reasons to seek emergent care or call 911 were discussed.   PCP notified as an FYI.     Reason for Disposition  • [1] MILD difficulty breathing (e.g., minimal/no SOB at rest, SOB with walking, pulse <100) AND [2] NEW-onset or WORSE than normal    Protocols used: BREATHING DIFFICULTY-A-AH       I have reviewed and confirmed nurses' notes...

## 2022-09-04 NOTE — ED ADULT NURSE REASSESSMENT NOTE - NS ED NURSE REASSESS COMMENT FT1
spoke to patient, patient is alert and oriented x3, strongly refused blood work and IV because he got twice before and came out perfectly normal. educated on need of blook work, but patient still refuses and wants to speak to doctor. made aware of Dr. Ch. spoke to patient, patient is alert and oriented x3, strongly refuses blood work and IV because he got twice before and came out perfectly normal. educated on need of blook work, but patient still refuses and wants to speak to doctor. made aware of Dr. Ch.

## 2022-09-04 NOTE — ED ADULT NURSE NOTE - OBJECTIVE STATEMENT
patient presents to ED from nursing home for low Hb checked 6.4, patient denies chest pain, sob, dizziness, abdominal pain, nausea, vomiting, fever. patient is A&Ox3, No distress, stable at bed.

## 2022-09-06 NOTE — HISTORY OF PRESENT ILLNESS
[FreeTextEntry1] : Patient not seen/evaluated or attended to\par He adamantly refused care - including obtaining an H&P, ultrasound, bladder scan, labs or any evaluation\par I spoke to daughter by phone; she stated there was no formal proxy or paperwork documenting her or anyone else as decision maker\par \par He refused care, aide could not provide any clarity nor could nurse supervisor when I called [facility] rehab. \par \par Patient left without evaluation

## 2022-09-09 NOTE — ED ADULT NURSE NOTE - OBJECTIVE STATEMENT
· HPI Objective Statement: 89 y/o M with a PMHx of Dementia, Anemia, bladder CA, CVA, heart failure, Afib on Eliquis, chronic kidney disease, BPH presents to the ED after being sent in for large amount of black tarry stool. Pt with dementia and unable to contribute to history. Pt is awake and agitated but telling providers to go away.      HPI as above, generalized pallor as well upon arrival, initial workup as noted per orders, pending provider eval.

## 2022-09-09 NOTE — ED ADULT TRIAGE NOTE - CHIEF COMPLAINT QUOTE
pt BIBA from [facility] for eval of dark tarry stools hx afib on elquis A&Ox1 at baseline c/o abd pain

## 2022-09-09 NOTE — ED PROVIDER NOTE - CLINICAL SUMMARY MEDICAL DECISION MAKING FREE TEXT BOX
89 y/o M with new melena with prior hx of anemia. Pt with hemoglobin of 7.7 on 09/04 and hemoglobin of 5.9 today. Pts blood pressure and heart rate is ok today. Will plan to transfuse and give Protonics and reverse Eliquis. Will need admission and discuss plan with pts daughter. Pt is DNR and DNI with no aggressive measures but is ok with blood transfusion. 89 y/o M with new melena with prior hx of anemia. Pt with hemoglobin of 7.7 on 09/04 and hemoglobin of 5.9 today. Pts blood pressure and heart rate is ok today. Will plan to transfuse and give Protonics and reverse Eliquis. Will need admission and discuss plan with pts daughter. Pt is DNR and DNI with no aggressive measures but is ok with blood transfusion.    GI aware  BP improved  discussed w family , + dnr dni , consent to blood

## 2022-09-09 NOTE — ED ADULT NURSE NOTE - NSIMPLEMENTINTERV_GEN_ALL_ED
Implemented All Fall with Harm Risk Interventions:  [place] to call system. Call bell, personal items and telephone within reach. Instruct patient to call for assistance. Room bathroom lighting operational. Non-slip footwear when patient is off stretcher. Physically safe environment: no spills, clutter or unnecessary equipment. Stretcher in lowest position, wheels locked, appropriate side rails in place. Provide visual cue, wrist band, yellow gown, etc. Monitor gait and stability. Monitor for mental status changes and reorient to person, place, and time. Review medications for side effects contributing to fall risk. Reinforce activity limits and safety measures with patient and family. Provide visual clues: red socks.

## 2022-09-09 NOTE — ED PROVIDER NOTE - OBJECTIVE STATEMENT
89 y/o M with a PMHx of Dementia, Anemia, bladder CA, CVA, heart failure, Afib on Eliquis, chronic kidney disease, BPH presents to the ED after being sent in for large amount of black tarry stool. Pt with dementia and unable to contribute to history. Pt is awake and agitated but telling providers to go away.

## 2022-09-09 NOTE — ED ADULT NURSE NOTE - LOCATION
Subjective:       Patient ID: Yovani Pulido is a 84 y.o. female.    Chief Complaint: Follow-up; Anemia; Results (Monoclonal gammopathy of undetermined significance); and Chronic Renal Failure    HPI 84-year-old female history of recurrent iron deficiency anemia iron monoclonal, gammopathy of undetermined significance and chronic renal insufficiency returns for follow-up CBC    Past Medical History   Diagnosis Date    Anemia     Arthritis      Rheumatoid    Carotid stenosis     Cataract     COAG (chronic open-angle glaucoma)     Colon polyps     Diabetes mellitus type II      steroid induced    Diverticulosis     GERD (gastroesophageal reflux disease)      hiatal hernia    Glaucoma     Heart murmur     Hyperlipidemia     Hypertension     Hypothyroidism     Stroke      lacunar infarct     Family History   Problem Relation Age of Onset    Cancer Mother      pancreas    Glaucoma Mother     Cancer Father      lung    Cancer Son 61     melanoma metastatic    Heart disease Brother     Diabetes Sister     Stroke Sister     Blindness Sister     Cataracts Sister     Heart disease Brother     Hyperlipidemia Neg Hx     Hypertension Neg Hx     Macular degeneration Neg Hx     Retinal detachment Neg Hx     Strabismus Neg Hx     Thyroid disease Neg Hx     Colon cancer Neg Hx     Stomach cancer Neg Hx      Social History     Social History    Marital status:      Spouse name: N/A    Number of children: 4    Years of education: N/A     Occupational History    Not on file.     Social History Main Topics    Smoking status: Former Smoker     Packs/day: 3.00     Years: 30.00     Quit date: 12/12/1998    Smokeless tobacco: Never Used    Alcohol use No    Drug use: No    Sexual activity: No     Other Topics Concern    Not on file     Social History Narrative    , then remarried.  after 2-3 weeks. They have still been living together for 31 years. He is 89 now. They are  no longer sexually active. Caffeine intake 4 cups coffee daily- though has changed to decaf recently. Does have a living will.      Past Surgical History   Procedure Laterality Date    Carotid endarterectomy  2009     left    Anal fissure repair      Joint replacement  2008     right knee    Eye surgery  2004, 2005     bilateral cataracts    Appendectomy  1944    Hysterectomy  1963     vag hyst    Breast surgery  1992 approx     breast biopsies- benign    Hernia repair  2001, 2002     abdominal x2, with mesh on second    Cataract extraction      Colonoscopy  2012    Colonoscopy N/A 12/11/2015     Procedure: COLONOSCOPY;  Surgeon: Gavin Escobedo MD;  Location: [facility];  Service: Endoscopy;  Laterality: N/A;       Labs:  Lab Results   Component Value Date    WBC 4.55 02/16/2017    HGB 10.7 (L) 02/16/2017    HCT 33.5 (L) 02/16/2017     (H) 02/16/2017     02/16/2017     BMP  Lab Results   Component Value Date     12/29/2016    K 4.6 12/29/2016     12/29/2016    CO2 27 12/29/2016    BUN 21 12/29/2016    CREATININE 1.2 12/29/2016    CALCIUM 8.8 12/29/2016    ANIONGAP 11 12/29/2016    ESTGFRAFRICA 48 (A) 12/29/2016    EGFRNONAA 42 (A) 12/29/2016     Lab Results   Component Value Date    ALT 10 10/21/2016    AST 17 10/21/2016    ALKPHOS 76 10/21/2016    BILITOT 0.4 10/21/2016       Lab Results   Component Value Date    IRON 22 (L) 12/29/2016    TIBC 283 12/29/2016    FERRITIN 1025 (H) 12/29/2016     Lab Results   Component Value Date    KXHNLSIG23 554 09/19/2016     Lab Results   Component Value Date    FOLATE > 40.0 (H) 04/15/2013     Lab Results   Component Value Date    TSH 2.091 06/27/2016         Review of Systems   Constitutional: Positive for fatigue. Negative for activity change, appetite change, chills, diaphoresis, fever and unexpected weight change.   HENT: Negative for congestion, dental problem, drooling, ear discharge, ear pain, facial swelling, hearing loss, mouth sores,  nosebleeds, postnasal drip, rhinorrhea, sinus pressure, sneezing, sore throat, tinnitus, trouble swallowing and voice change.    Eyes: Negative for photophobia, pain, discharge, redness, itching and visual disturbance.   Respiratory: Negative for cough, choking, chest tightness, shortness of breath, wheezing and stridor.    Cardiovascular: Negative for chest pain, palpitations and leg swelling.   Gastrointestinal: Negative for abdominal distention, abdominal pain, anal bleeding, blood in stool, constipation, diarrhea, nausea, rectal pain and vomiting.   Endocrine: Negative for cold intolerance, heat intolerance, polydipsia, polyphagia and polyuria.   Genitourinary: Negative for decreased urine volume, difficulty urinating, dyspareunia, dysuria, enuresis, flank pain, frequency, genital sores, hematuria, menstrual problem, pelvic pain, urgency, vaginal bleeding, vaginal discharge and vaginal pain.   Musculoskeletal: Positive for gait problem. Negative for arthralgias, back pain, joint swelling, myalgias, neck pain and neck stiffness.   Skin: Negative for color change, pallor and rash.   Allergic/Immunologic: Negative for environmental allergies, food allergies and immunocompromised state.   Neurological: Positive for weakness. Negative for dizziness, tremors, seizures, syncope, facial asymmetry, speech difficulty, light-headedness, numbness and headaches.   Hematological: Negative for adenopathy. Does not bruise/bleed easily.   Psychiatric/Behavioral: Negative for agitation, behavioral problems, confusion, decreased concentration, dysphoric mood, hallucinations, self-injury, sleep disturbance and suicidal ideas. The patient is not nervous/anxious and is not hyperactive.        Objective:      Physical Exam   Constitutional: She is oriented to person, place, and time. She appears well-developed and well-nourished. No distress.   HENT:   Head: Normocephalic and atraumatic.   Right Ear: External ear normal.   Left Ear:  External ear normal.   Nose: Nose normal. Right sinus exhibits no maxillary sinus tenderness and no frontal sinus tenderness. Left sinus exhibits no maxillary sinus tenderness and no frontal sinus tenderness.   Mouth/Throat: Oropharynx is clear and moist. No oropharyngeal exudate.   Eyes: Conjunctivae, EOM and lids are normal. Pupils are equal, round, and reactive to light. Right eye exhibits no discharge. Left eye exhibits no discharge. Right conjunctiva is not injected. Right conjunctiva has no hemorrhage. Left conjunctiva is not injected. Left conjunctiva has no hemorrhage. No scleral icterus.   Neck: Normal range of motion. Neck supple. No JVD present. No tracheal deviation present. No thyromegaly present.   Cardiovascular: Normal rate and regular rhythm.    Pulmonary/Chest: Effort normal. No stridor. No respiratory distress. She exhibits no tenderness.   Abdominal: Soft. She exhibits no distension and no mass. There is no splenomegaly or hepatomegaly. There is no tenderness. There is no rebound.   Musculoskeletal: Normal range of motion. She exhibits no edema or tenderness.   Lymphadenopathy:     She has no cervical adenopathy.     She has no axillary adenopathy.        Right: No supraclavicular adenopathy present.        Left: No supraclavicular adenopathy present.   Neurological: She is alert and oriented to person, place, and time. No cranial nerve deficit. Coordination normal.   Skin: Skin is dry. No rash noted. She is not diaphoretic. No erythema.   Psychiatric: She has a normal mood and affect. Her behavior is normal. Judgment and thought content normal.   Vitals reviewed.          Assessment:       1. Iron deficiency anemia due to chronic blood loss    2. Chronic renal failure, stage 3 (moderate)    3. Monoclonal gammopathy of undetermined significance    4. Anemia of chronic renal failure, stage 3 (moderate)            Plan:         patient's hemoglobin is 10.7 BMP pending at this point as well as iron  status will return in 2-3 months with labs prior told to call sooner if she has increasing fatigue and weakness she feels dramatically improved after intravenous iron will also check SPEP and free light chain at that visit   rectum

## 2022-09-09 NOTE — ED ADULT NURSE REASSESSMENT NOTE - NS ED NURSE REASSESS COMMENT FT1
still attempting to locate Pharmacy for entra, no answer, despite multiple calls, will continue trying

## 2022-09-09 NOTE — ED ADULT NURSE REASSESSMENT NOTE - NS ED NURSE REASSESS COMMENT FT1
spoke with blood bank, states may send for Pickup, Dr. Bobo notified to call blood bank prior to 3rd unit, for clarification

## 2022-09-09 NOTE — ED ADULT NURSE NOTE - CHIEF COMPLAINT QUOTE
pt BIBA from [facility] for eval of dark tarry stools hx afib on elquis A&Ox1 at baseline c/o abd pain

## 2022-09-09 NOTE — ED ADULT NURSE REASSESSMENT NOTE - NS ED NURSE REASSESS COMMENT FT1
pt. with large black liquid bm, claudia care provided, linens changed, placed on CCM, assessment on-going, awaiting readiness of PRBC's for transfusion

## 2022-09-09 NOTE — ED PROVIDER NOTE - PHYSICAL EXAMINATION
VITAL SIGNS: I have reviewed nursing notes and confirm.  CONSTITUTIONAL: Pale, awake alert and chronically ill appearing; well-nourished; in no acute distress. Calm when left alone but agitated during interactions.   SKIN: Agree with RN documentation regarding decubitus evaluation. Remainder of skin exam is warm and dry, no acute rash.  HEAD: Normocephalic; atraumatic.  EYES: PERRL, EOM intact; conjunctiva and sclera clear.  ENT: No nasal discharge; airway clear.  NECK: Supple; non tender.  CARD: S1, S2 normal; no murmurs, gallops, or rubs. Regular rate and rhythm.  RESP: No wheezes, rales or rhonchi.  ABD: Normal bowel sounds; soft; non-distended; non-tender; no hepatosplenomegaly. Melena on arrival with no bright red blood.   EXT: Normal ROM. No clubbing, cyanosis or edema.  LYMPH: No acute cervical adenopathy.  NEURO: Alert, oriented. Grossly unremarkable.  PSYCH: Cooperative, appropriate.

## 2022-09-10 NOTE — PROGRESS NOTE ADULT - ASSESSMENT
89 y/o male PMH TIA, restrictive CM, CHF, HTN, HLD, Afib (on Eliquis), CKD (baseline Scr 2.0), BPH, urinary retention admitted to [facility] for melena and anemia requiring transfusion

## 2022-09-10 NOTE — PROGRESS NOTE ADULT - ASSESSMENT
ASSESSMENT/PLAN89 y/o male PMH TIA, restrictive CM, CHF, HTN, HLD, Afib (on Eliquis), CKD (baseline Scr 2.0), BPH, urinary retention, limited mobility, [facility], symptomatic anemia c  sob, , recent aspiration pneumonitis, left pleural effusion,  UTI, SADIQ on CKD     1. O2 continue  off   2. Bronchodilators:  Atrovent/ albuterol q 4 – 6 hours as needed  3. Corticosteroids: off  4. ID/Antibiotics:   5. Cardiac/HTN: optimize BP   6. GI: Rx/ prophylaxis c PPI/H2B  7. Heme: Rx/VT prophylaxis c SQH/SCD/hold  Eliquis, use SCD if Doppler negative, post transfusion labs   8. Aspiration precautions at all times    Discussed c managing team, verify GOC

## 2022-09-10 NOTE — CONSULT NOTE ADULT - ASSESSMENT
89 yo M with PMhx TIA, restrictive cardiomyopathy/chronic HFmEF, Afib on Eliquis, CKD (baseline SCr 2), admitted for melena and associated anemia.    #GIB: Presumably UGI source given melena. Anemia on admission 5.9 s/p 3 units pRBCs with AM Hgb 9.4. BUN/Cr 98/3.6 (baseline in Aug 84/3.7).  Hemodynamically stable otherwise now on PPI gtt.    DOAC reversed in ED.  Pt is DNR. I personally discussed role of endoscopic eval with pts family/healthcare proxy (Celina) though in light of pt clinical stability and known wishes to avoid procedures and focus on comfort, healthcare proxy expressed desire to hold off on endoscopic intervention at this time and re-consider with clinical decompensation.    Recommendations:    -Continue medical mgmt for now with PPI gtt  -Repeat H/H at 1400 today; monitor closely  -Maintain active T &S, transfusion threshold per primary team  -Continue to hold anticoag in setting of GIB    Vega Peoples DO, FACP  Gastroenterology Fellow  Pager: [phone] 89 yo M with PMhx TIA, restrictive cardiomyopathy/chronic HFmEF, Afib on Eliquis, CKD (baseline SCr 2), admitted for melena and associated anemia.    #GIB: Presumably UGI source given melena. Anemia on admission 5.9 s/p 3 units pRBCs with AM Hgb 9.4. BUN/Cr 98/3.6 (baseline in Aug 84/3.7).  Hemodynamically stable otherwise now on PPI gtt.    DOAC reversed in ED.  Pt is DNR. I personally discussed role of endoscopic eval with pts family/healthcare proxy (Celina) though in light of pt clinical stability and known wishes to avoid procedures and focus on comfort, healthcare proxy expressed desire to hold off on endoscopic intervention at this time and re-consider with clinical decompensation.    Recommendations:    -OK to transition to protonix 40 mg Q12hrs  -CLD OK today  -Repeat H/H at 1400 today; monitor closely  -Maintain active T &S, transfusion threshold per primary team  -Continue to hold anticoag in setting of GIB    Vega Peoples DO, FACP  Gastroenterology Fellow  Pager: [phone]

## 2022-09-10 NOTE — H&P ADULT - PROBLEM SELECTOR PLAN 3
Hx of HFrEF, pr does not appear to be in active exacerbation. Home meds discontinued on previous admission 2/2 to SADIQ on CKD    Plan:  - Transfuse pRBCs slowly Hx of HFrEF, pr does not appear to be in active exacerbation. Home meds discontinued on previous admission 2/2 to SADIQ on CKD.   TTE 8/2022: EF 45-50%    Plan:  - Transfuse pRBCs slowly

## 2022-09-10 NOTE — PROGRESS NOTE ADULT - ASSESSMENT
89 y/o male PMH TIA, restrictive CM, CHF, HTN, HLD, Afib (on Eliquis), CKD (baseline Scr 2.0), BPH, urinary retention admitted to [facility] for melena and anemia requiring transfusion

## 2022-09-10 NOTE — H&P ADULT - HISTORY OF PRESENT ILLNESS
In the ED:  Initial vital signs: T: XX F, HR: XX, BP: XX, R: XX, SpO2: XX% on RA  Labs: significant for   Imaging:   CXR:   EKG:   Medications: PPI gtt, 1U pRBCs  Consults: GI In the ED:  Initial vital signs: T: 98.3F, HR: 73, BP: 113/55, R: 18, SpO2: 97% on RA  Labs: significant for WBC 14.73 w/ neutrophil predominance 91.4%, Hgb 7.2>>5.9, Hct 19, .1, Plt 355, PT 17.1, INR 1.43, BUN 98, Cr 3.63, GFR 15  Imaging:   CXR: no acute cardiopulmonary findings, cardiomegaly  EKG: Afib | , QTc 476  Medications: protonix gtt, 1U pRBCs  Consults: GI 89 y/o M with a PMHx of Dementia, Anemia, bladder CA, CVA, heart failure, Afib on Eliquis, chronic kidney disease, BPH presents to the ED after being sent in for large amount of black tarry stool. Pt with dementia and unable to contribute to history.    In the ED:  Initial vital signs: T: 98.3F, HR: 73, BP: 113/55, R: 18, SpO2: 97% on RA  Labs: significant for WBC 14.73 w/ neutrophil predominance 91.4%, Hgb 7.2>>5.9, Hct 19, .1, Plt 355, PT 17.1, INR 1.43, BUN 98, Cr 3.63, GFR 15  Imaging:   CXR: no acute cardiopulmonary findings, cardiomegaly  EKG: Afib | , QTc 476  Medications: protonix gtt, 1U pRBCs  Consults: GI 89 y/o M with a PMHx of Dementia, Anemia, bladder CA, CVA, heart failure, Afib on Eliquis, chronic kidney disease, BPH presents to the ED after being sent in for large amount of black tarry stool. Pt with dementia and unable to contribute to history effectively. Pt complains of pain everywhere but unable to fully participate in ROS and subjective portion of assessment. Pt received 3u pRBCs in the ED without any further noted episodes of melena or hematochezia while in the ED.    In the ED:  Initial vital signs: T: 98.3F, HR: 73, BP: 113/55, R: 18, SpO2: 97% on RA  Labs: significant for WBC 14.73 w/ neutrophil predominance 91.4%, Hgb 7.2>>5.9, Hct 19, .1, Plt 355, PT 17.1, INR 1.43, BUN 98, Cr 3.63, GFR 15  Imaging:   CXR: no acute cardiopulmonary findings, cardiomegaly  EKG: Afib | , QTc 476  Medications: protonix gtt, 1U pRBCs  Consults: GI 89 y/o M with a PMHx of Dementia, Anemia, bladder CA, CVA, heart failure, Afib on Eliquis, chronic kidney disease, BPH presents to the ED after being sent in for large amount of black tarry stool. Pt with dementia and unable to contribute to history effectively. Pt complains of pain everywhere but unable to fully participate in ROS and subjective portion of assessment. Pt received 3u pRBCs in the ED without any further noted episodes of melena or hematochezia while in the ED.    In the ED:  Initial vital signs: T: 98.3F, HR: 73, BP: 113/55, R: 18, SpO2: 97% on RA  Labs: significant for WBC 14.73 w/ neutrophil predominance 91.4%, Hgb 7.2>>5.9, Hct 19, .1, Plt 355, PT 17.1, INR 1.43, BUN 98, Cr 3.63, GFR 15  Imaging:   CXR: no acute cardiopulmonary findings, cardiomegaly  EKG: Afib | , QTc 476  Medications: protonix gtt, 3U pRBCs, KCentra  Consults: GI

## 2022-09-10 NOTE — H&P ADULT - NSHPPHYSICALEXAM_GEN_ALL_CORE
VITALS: T(C): 37.1 (09-10-22 @ 00:55), Max: 37.2 (09-09-22 @ 23:40)  T(F): 98.7 (09-10-22 @ 00:55), Max: 98.9 (09-09-22 @ 23:40)  HR: 103 (09-10-22 @ 00:55) (73 - 118)  BP: 168/94 (09-10-22 @ 00:55) (108/58 - 168/94)  ABP: --  ABP(mean): --  RR: 14 (09-10-22 @ 00:55) (14 - 20)  SpO2: 96% (09-10-22 @ 00:55) (95% - 98%)    GENERAL: NAD, lying in bed comfortably  HEAD:  Atraumatic, Normocephalic  EYES: EOMI, PERRLA, conjunctiva and sclera clear  ENT: Moist mucous membranes  NECK: Supple, No JVD  CHEST/LUNG: Clear to auscultation bilaterally; No rales, rhonchi, wheezing, or rubs. Unlabored respirations  HEART: Regular rate and rhythm; No murmurs, rubs, or gallops  ABDOMEN: Bowel sounds present; Soft, Nontender, Nondistended. No hepatomegally  EXTREMITIES:  2+ Peripheral Pulses, brisk capillary refill. No clubbing, cyanosis, or edema  NERVOUS SYSTEM:  Alert & Oriented X3, speech clear. No deficits   MSK: FROM all 4 extremities, full and equal strength  SKIN: No rashes or lesions VITALS: T(C): 37.1 (09-10-22 @ 00:55), Max: 37.2 (09-09-22 @ 23:40)  T(F): 98.7 (09-10-22 @ 00:55), Max: 98.9 (09-09-22 @ 23:40)  HR: 103 (09-10-22 @ 00:55) (73 - 118)  BP: 168/94 (09-10-22 @ 00:55) (108/58 - 168/94)  ABP: --  ABP(mean): --  RR: 14 (09-10-22 @ 00:55) (14 - 20)  SpO2: 96% (09-10-22 @ 00:55) (95% - 98%)    GENERAL: pt slightly confused, appears uncomfortable  HEAD:  Atraumatic, Normocephalic  EYES: EOMI, PERRLA, conjunctiva and sclera clear  ENT: Moist mucous membranes, dark areas on tongue, appear consistent with previous possibly bloody emesis  NECK: Supple, No JVD  CHEST/LUNG: Clear to auscultation bilaterally; No rales, rhonchi, wheezing, or rubs. Unlabored respirations  HEART: irregular rate; cardiac exam difficult to complete as pt will not stop speaking  ABDOMEN: Bowel sounds present; Soft, Nontender, Nondistended. No hepatosplenomegaly  EXTREMITIES:  1+ Peripheral Pulses, brisk capillary refill. B/L trace edema w/ tenderness to palpation at ankles   NERVOUS SYSTEM:  AOx1, aware of self; unable to fully assess

## 2022-09-10 NOTE — H&P ADULT - PROBLEM SELECTOR PLAN 8
F: pending 2U pRBCs  E: replete K<4, Mg<2  N: NPO  D: SCDs    Dispo: 7L Home meds: Sertraline 50mg     Plan:  - Continue home meds

## 2022-09-10 NOTE — H&P ADULT - PROBLEM SELECTOR PLAN 1
Pt presenting from [facility] with melena, Hgb 7.6 on arrival to the ED with downtrend to 5.9 while in ED. Pt s/p Eliquis reversal.     Plan:  - PPI IV BID  - F/U CBC following 2UpRBCs  - F/U GI recs  - Transfuse goal <7  - Maintain active T&S Pt presenting from [facility] with melena, Hgb 7.6 on arrival to the ED with downtrend to 5.9 while in ED. Pt s/p Eliquis reversal. Hematemasis not reported on admission, however, pt noted to have dark residue on tongue and periorally.    Plan:  - PPI gtt  - F/U CBC following 3UpRBCs  - F/U GI recs  - Transfuse goal <7  - Maintain active T&S

## 2022-09-10 NOTE — ED ADULT NURSE REASSESSMENT NOTE - NS ED NURSE REASSESS COMMENT FT1
finally reached 7L staff following multiple attempts, [facility] nurse is busy and will call ED back

## 2022-09-10 NOTE — CONSULT NOTE ADULT - ATTENDING COMMENTS
Jed Mendes  [id number]  This is a 89 y/ male with Afib, on Eliquis now with melena.  Hgb 5.9, he received K centra in the ED. Transfuse PRBC, f/u post transfusion hgb, maintain 2 large bore PIV, hold Eliquis, GI consult.  Please see the resident's note for the rest of the details.
89 with multiple medical problems, and hematochezia requiring transfusion  Seen/discussed with fellow  Agree with above

## 2022-09-10 NOTE — PROGRESS NOTE ADULT - PROBLEM SELECTOR PLAN 1
Pt presenting from [facility] with melena, Hgb 7.6 on arrival to the ED with downtrend to 5.9 while in ED. S/p 3UpRBC's w/ Hgb now to 9.4. Hematemesis not reported on admission, however, pt noted to have dark residue on tongue and periorally. Was placed on PPI gtt. Pt s/p Eliquis reversal. Complains of pain all over but can be 2/2 to dementia.    Plan:  - PPI 40 mg q12h  - F/U GI recs  - Transfuse goal <7  - Maintain active T&S  - Acetaminophen 650 mg q6h standing and 2 mg Dilauded q6h prn for severe pain Pt presenting from [facility] with melena, Hgb 7.6 on arrival to the ED with downtrend to 5.9 while in ED. S/p 3UpRBC's w/ Hgb now to 9.4. Hematemesis not reported on admission, however, pt noted to have dark residue on tongue and periorally. Was placed on PPI gtt. Pt s/p Eliquis reversal. Complains of pain all over but can be 2/2 to dementia.    Plan:  - PPI 40 mg q12h  - F/U GI recs  - Transfuse goal <7  - Maintain active T&S  - Acetaminophen 650 mg q6h standing and 2 mg Dilauded q6h prn for severe pain  - Trend CBC

## 2022-09-10 NOTE — H&P ADULT - ATTENDING COMMENTS
This patient was evaluated as an ICU consult and accepted to [facility] for management, please refer to that note for the details.  The billing was done on that note.

## 2022-09-10 NOTE — ED ADULT NURSE REASSESSMENT NOTE - NS ED NURSE REASSESS COMMENT FT1
Protonix gtt. was initiated following completion of 2nd Unit PRBC's, awaiting arrival of 3rd Unit, assessment on-going

## 2022-09-10 NOTE — PROGRESS NOTE ADULT - SUBJECTIVE AND OBJECTIVE BOX
SUBJECTIVE / INTERVAL HPI: Patient seen and examined at bedside. Admitted to hospital overnight. Able to state where he is, who he is, and the year but has dementia at baseline with agitation. When asked about pain, he suggests that he has pain all over. Denies sob, chest pain      PHYSICAL EXAM:    General: WDWN  HEENT: NC/AT; PERRL, anicteric sclera; MMM  Neck: supple  Cardiovascular: +S1/S2, RRR  Respiratory: CTA B/L; no W/R/R  Gastrointestinal: soft, NT/ND; +BSx4  Extremities: WWP; no edema, clubbing or cyanosis  Vascular: 2+ radial, DP/PT pulses B/L  Neurological: AAOx3; no focal deficits  Psychiatric: pleasant mood and affect  Dermatologic: no appreciable wounds or damage to the skin    VITAL SIGNS:  Vital Signs Last 24 Hrs  T(C): 36.8 (10 Sep 2022 10:00), Max: 37.2 (09 Sep 2022 23:40)  T(F): 98.2 (10 Sep 2022 10:00), Max: 98.9 (09 Sep 2022 23:40)  HR: 80 (10 Sep 2022 11:40) (73 - 118)  BP: 146/65 (10 Sep 2022 11:40) (108/58 - 169/90)  BP(mean): 94 (10 Sep 2022 11:40) (75 - 97)  RR: 18 (10 Sep 2022 11:40) (14 - 20)  SpO2: 97% (10 Sep 2022 11:40) (95% - 98%)    Parameters below as of 10 Sep 2022 11:40  Patient On (Oxygen Delivery Method): room air          MEDICATIONS:  MEDICATIONS  (STANDING):  acetaminophen     Tablet .. 650 milliGRAM(s) Oral every 6 hours  atorvastatin 10 milliGRAM(s) Oral at bedtime  fenofibrate Tablet 145 milliGRAM(s) Oral every 24 hours  finasteride 5 milliGRAM(s) Oral daily  pantoprazole  Injectable 40 milliGRAM(s) IV Push every 12 hours  sertraline 50 milliGRAM(s) Oral daily  tamsulosin 0.4 milliGRAM(s) Oral at bedtime    MEDICATIONS  (PRN):  HYDROmorphone   Tablet 2 milliGRAM(s) Oral every 6 hours PRN Severe Pain (7 - 10)      ALLERGIES:  Allergies    No Known Allergies    Intolerances        LABS:                        9.4    13.24 )-----------( 257      ( 10 Sep 2022 06:35 )             28.9     09-10    143  |  112<H>  |  94<H>  ----------------------------<  99  4.4   |  19<L>  |  3.80<H>    Ca    9.1      10 Sep 2022 06:35  Phos  3.4     09-10  Mg     2.8     09-10    TPro  4.5<L>  /  Alb  2.1<L>  /  TBili  0.6  /  DBili  x   /  AST  48<H>  /  ALT  27  /  AlkPhos  53  09-09    PT/INR - ( 09 Sep 2022 22:20 )   PT: 17.1 sec;   INR: 1.43          PTT - ( 09 Sep 2022 22:20 )  PTT:27.5 sec    CAPILLARY BLOOD GLUCOSE          RADIOLOGY & ADDITIONAL TESTS: Reviewed. TRANSFER NOTE 7 [facility] TO [address] [place] [address] Course:   Mr. Mendes is a 89 y/o M with a PMHx of dementia, anemia, bladder CA, CVA, heart failure, Afib on Eliquis, chronic kidney disease, BPH presenting to [facility] after being sent in from nursing home for large amount of black tarry stool. On initial lab work, Hgb was 5.9 on admission requiring 3pRBCS in the ED which increased his Hgb to 9.4 and was started on PPI ggt for concern for GI bleed. GI was consulted and recommended a switch to oral Pantoprazole 40 mg q12h. CBC was reordered for 2 pm and anticoagulation has been held iso of GI bleed. He was provided Haldol 2.5 mg IV one time for agitation. Patient is medically stable to be stepped down to the F.       SUBJECTIVE / INTERVAL HPI: Patient seen and examined at bedside. Admitted to hospital overnight. Able to state where he is, who he is, and the year but has dementia at baseline with agitation and remains unclear as to what brought him into the hospital. When asked about pain he suggests that he has pain all over. Difficult to get full review of systems as patient is noncompliant.       PHYSICAL EXAM:    General: Agitated,    HEENT: PERRL, anicteric sclera; MMM  Cardiovascular: +S1/S2, RRR  Respiratory: CTA B/L; no W/R/R  Gastrointestinal: soft, NT/ND; +BSx4  Extremities: No edema, clubbing or cyanosis  Vascular: 2+ radial, PT pulses B/L  Neurological: AAOx3 but dementia at basline    VITAL SIGNS:  Vital Signs Last 24 Hrs  T(C): 36.8 (10 Sep 2022 10:00), Max: 37.2 (09 Sep 2022 23:40)  T(F): 98.2 (10 Sep 2022 10:00), Max: 98.9 (09 Sep 2022 23:40)  HR: 80 (10 Sep 2022 11:40) (73 - 118)  BP: 146/65 (10 Sep 2022 11:40) (108/58 - 169/90)  BP(mean): 94 (10 Sep 2022 11:40) (75 - 97)  RR: 18 (10 Sep 2022 11:40) (14 - 20)  SpO2: 97% (10 Sep 2022 11:40) (95% - 98%)    Parameters below as of 10 Sep 2022 11:40  Patient On (Oxygen Delivery Method): room air    MEDICATIONS:  MEDICATIONS  (STANDING):  acetaminophen     Tablet .. 650 milliGRAM(s) Oral every 6 hours  atorvastatin 10 milliGRAM(s) Oral at bedtime  fenofibrate Tablet 145 milliGRAM(s) Oral every 24 hours  finasteride 5 milliGRAM(s) Oral daily  pantoprazole  Injectable 40 milliGRAM(s) IV Push every 12 hours  sertraline 50 milliGRAM(s) Oral daily  tamsulosin 0.4 milliGRAM(s) Oral at bedtime    MEDICATIONS  (PRN):  HYDROmorphone   Tablet 2 milliGRAM(s) Oral every 6 hours PRN Severe Pain (7 - 10)      ALLERGIES:  Allergies    No Known Allergies    Intolerances    LABS:                        9.4    13.24 )-----------( 257      ( 10 Sep 2022 06:35 )             28.9     09-10    143  |  112<H>  |  94<H>  ----------------------------<  99  4.4   |  19<L>  |  3.80<H>    Ca    9.1      10 Sep 2022 06:35  Phos  3.4     09-10  Mg     2.8     09-10    TPro  4.5<L>  /  Alb  2.1<L>  /  TBili  0.6  /  DBili  x   /  AST  48<H>  /  ALT  27  /  AlkPhos  53  09-09    PT/INR - ( 09 Sep 2022 22:20 )   PT: 17.1 sec;   INR: 1.43          PTT - ( 09 Sep 2022 22:20 )  PTT:27.5 sec    CAPILLARY BLOOD GLUCOSE          RADIOLOGY & ADDITIONAL TESTS: Reviewed.

## 2022-09-10 NOTE — PROGRESS NOTE ADULT - PROBLEM SELECTOR PLAN 1
Pt presenting from [facility] with melena, Hgb 7.6 on arrival to the ED with downtrend to 5.9 while in ED. S/p 3UpRBC's w/ Hgb now to 9.4. Hematemesis not reported on admission, however, pt noted to have dark residue on tongue and periorally. Was placed on PPI gtt. Pt s/p Eliquis reversal. Complains of pain all over but can be 2/2 to dementia.    Plan:  - PPI 40 mg q12h  - F/U GI recs  - Transfuse goal <7  - Maintain active T&S  - Acetaminophen 650 mg q6h standing and 2 mg Dilauded q6h prn for severe pain

## 2022-09-10 NOTE — CONSULT NOTE ADULT - SUBJECTIVE AND OBJECTIVE BOX
HPI:  Predominantly obtained from chart review/discussion with ED and primary team. Pt unaware of why he was admitted.    89 y/o M with a PMHx of Dementia, Anemia, bladder CA, CVA, heart failure, Afib on Eliquis, chronic kidney disease, BPH presents to the ED after being sent in for large amount of black tarry stool. Pt with dementia and unable to contribute to history effectively. Pt complains of pain everywhere but unable to fully participate in ROS and subjective portion of assessment. Pt received 3u pRBCs in the ED without any further noted episodes of melena or hematochezia while in the ED.    In the ED:  Initial vital signs: T: 98.3F, HR: 73, BP: 113/55, R: 18, SpO2: 97% on RA  Labs: significant for WBC 14.73 w/ neutrophil predominance 91.4%, Hgb 7.2>>5.9, Hct 19, .1, Plt 355, PT 17.1, INR 1.43, BUN 98, Cr 3.63, GFR 15  Imaging:   CXR: no acute cardiopulmonary findings, cardiomegaly  EKG: Afib | , QTc 476  Medications: protonix gtt, 3U pRBCs, KCentra  Consults: GI (10 Sep 2022 00:58)    No melena noted overnight. Pt reports feeling 'OK' this AM and refuses intervention.     Allergies    No Known Allergies    Intolerances      Home Medications:  ascorbic acid 500 mg oral tablet: 1 tab(s) orally once a day (10 Sep 2022 02:48)  docusate potassium 100 mg oral capsule: 1 cap(s) orally 2 times a day (10 Sep 2022 02:48)  doxepin 3 mg oral tablet: 1 tab(s) orally once a day (at bedtime) (10 Sep 2022 02:48)  Eliquis 2.5 mg oral tablet: 1 tab(s) orally 2 times a day (10 Sep 2022 02:48)  fenofibrate 145 mg oral tablet: 1 tab(s) orally once a day (10 Sep 2022 02:48)  finasteride 5 mg oral tablet: 1 tab(s) orally once a day (10 Sep 2022 02:48)  Flomax 0.4 mg oral capsule: 1 cap(s) orally once a day (10 Sep 2022 02:48)  lidocaine 4% patch: Apply topically to lower back once a day (10 Sep 2022 02:48)  Lipitor 10 mg oral tablet: 1 tab(s) orally once a day (10 Sep 2022 02:48)  sertraline 50 mg oral tablet: 1 tab(s) orally once a day (10 Sep 2022 02:48)  Tylenol 325 mg oral tablet: 2 tab(s) orally every 6 hours, As Needed for pain (10 Sep 2022 02:48)    MEDICATIONS:  MEDICATIONS  (STANDING):  atorvastatin 10 milliGRAM(s) Oral at bedtime  fenofibrate Tablet 145 milliGRAM(s) Oral every 24 hours  finasteride 5 milliGRAM(s) Oral daily  pantoprazole Infusion 8 mG/Hr (10 mL/Hr) IV Continuous <Continuous>  sertraline 50 milliGRAM(s) Oral daily  tamsulosin 0.4 milliGRAM(s) Oral at bedtime    MEDICATIONS  (PRN):    PAST MEDICAL & SURGICAL HISTORY:  Chronic CHF  Hypertension  Hyperlipidemia  Chronic kidney disease (CKD)  BPH (benign prostatic hyperplasia)  Chronic atrial fibrillation    FAMILY HISTORY: Unable to verify with pt due to baseline dementia    SOCIAL HISTORY:  Unable to verify with pt due to baseline dementia      REVIEW OF SYSTEMS:  All other 10 review of systems is negative unless indicated above.    Vital Signs Last 24 Hrs  T(C): 36.8 (10 Sep 2022 10:00), Max: 37.2 (09 Sep 2022 23:40)  T(F): 98.2 (10 Sep 2022 10:00), Max: 98.9 (09 Sep 2022 23:40)  HR: 76 (10 Sep 2022 08:22) (73 - 118)  BP: 119/83 (10 Sep 2022 08:22) (108/58 - 169/90)  BP(mean): 97 (10 Sep 2022 08:22) (75 - 97)  RR: 18 (10 Sep 2022 08:22) (14 - 20)  SpO2: 98% (10 Sep 2022 08:22) (95% - 98%)    Parameters below as of 10 Sep 2022 08:22  Patient On (Oxygen Delivery Method): room air        09-09 @ 07:01  -  09-10 @ 07:00  --------------------------------------------------------  IN: 980 mL / OUT: 0 mL / NET: 980 mL        PHYSICAL EXAM:    General: No acute distress  Eyes: Anicteric sclerae, moist conjunctivae  HENT: Moist mucous membranes  Neck: Trachea midline, supple  Lungs: Normal respiratory effort and no intercostal retractions  Abdomen: Soft, non-tender non-distended; No rebound or guarding  Extremities: Normal range of motion, No clubbing, cyanosis or edema  Neurological: Alert and oriented to person, year, place (in a hospital).   Skin: Warm and dry. No obvious rash    LABS:                        9.4    13.24 )-----------( 257      ( 10 Sep 2022 06:35 )             28.9     09-10    143  |  112<H>  |  94<H>  ----------------------------<  99  4.4   |  19<L>  |  3.80<H>    Ca    9.1      10 Sep 2022 06:35  Phos  3.4     09-10  Mg     2.8     09-10    TPro  4.5<L>  /  Alb  2.1<L>  /  TBili  0.6  /  DBili  x   /  AST  48<H>  /  ALT  27  /  AlkPhos  53  09-09        PT/INR - ( 09 Sep 2022 22:20 )   PT: 17.1 sec;   INR: 1.43          PTT - ( 09 Sep 2022 22:20 )  PTT:27.5 sec    RADIOLOGY & ADDITIONAL STUDIES:

## 2022-09-10 NOTE — H&P ADULT - NSHPLABSRESULTS_GEN_ALL_CORE
.  LABS:                         5.9    14.73 )-----------( 355      ( 09 Sep 2022 22:20 )             19.0     09-09    143  |  108  |  98<H>  ----------------------------<  132<H>  4.2   |  22  |  3.63<H>    Ca    9.4      09 Sep 2022 22:20    TPro  4.5<L>  /  Alb  2.1<L>  /  TBili  0.6  /  DBili  x   /  AST  48<H>  /  ALT  27  /  AlkPhos  53  09-09    PT/INR - ( 09 Sep 2022 22:20 )   PT: 17.1 sec;   INR: 1.43          PTT - ( 09 Sep 2022 22:20 )  PTT:27.5 sec          RADIOLOGY, EKG & ADDITIONAL TESTS: Reviewed.

## 2022-09-10 NOTE — H&P ADULT - PROBLEM SELECTOR PLAN 5
Home medications Coreg 3.125mg BID, Enalapril 5mg qd, Lasix 20mg qd, Hydralazine 50mg TID were held following previous admission due to SADIQ. Normotensive on admission    Plan:  - Hold i/s/o GIB and SADIQ Home medications Coreg 3.125mg BID, Enalapril 5mg qd, Lasix 20mg qd, Hydralazine 50mg TID were held following previous admission due to SADIQ. Normotensive on admission    Plan:  - Cont home meds

## 2022-09-10 NOTE — CONSULT NOTE ADULT - SUBJECTIVE AND OBJECTIVE BOX
Dispo: 7L    All recommendations are considered final after attending attestation.  89 y/o M with a PMHx of Dementia, Anemia, bladder CA, CVA, heart failure, Afib on Eliquis, chronic kidney disease, BPH presents to the ED after being sent in for large amount of black tarry stool. Pt with dementia and unable to contribute to history effectively. Pt complains of pain everywhere and unable to fully participate in ROS and subjective portion of assessment. ICU consulted for GIB      REVIEW OF SYSTEMS: see HPI    PAST MEDICAL & SURGICAL HISTORY:  Chronic CHF      Hypertension      Hyperlipidemia      Chronic kidney disease (CKD)      BPH (benign prostatic hyperplasia)      Chronic atrial fibrillation          MEDICATIONS:  MEDICATIONS  (STANDING):  atorvastatin 10 milliGRAM(s) Oral at bedtime  fenofibrate Tablet 145 milliGRAM(s) Oral every 24 hours  finasteride 5 milliGRAM(s) Oral daily  pantoprazole Infusion 8 mG/Hr (10 mL/Hr) IV Continuous <Continuous>  sertraline 50 milliGRAM(s) Oral daily  tamsulosin 0.4 milliGRAM(s) Oral at bedtime    MEDICATIONS  (PRN):      ALLERGIES:  Allergies    No Known Allergies    Intolerances        VITAL SIGNS:  Vital Signs Last 24 Hrs  T(C): 37.1 (10 Sep 2022 02:25), Max: 37.2 (09 Sep 2022 23:40)  T(F): 98.7 (10 Sep 2022 02:25), Max: 98.9 (09 Sep 2022 23:40)  HR: 86 (10 Sep 2022 02:38) (73 - 118)  BP: 128/72 (10 Sep 2022 02:38) (108/58 - 169/90)  BP(mean): 95 (10 Sep 2022 02:38) (95 - 95)  RR: 18 (10 Sep 2022 02:38) (14 - 20)  SpO2: 96% (10 Sep 2022 02:38) (95% - 98%)    Parameters below as of 10 Sep 2022 02:38  Patient On (Oxygen Delivery Method): room air        09-09-22 @ 07:01  -  09-10-22 @ 03:30  --------------------------------------------------------  IN:    IV PiggyBack: 80 mL    PRBCs (Packed Red Blood Cells): 900 mL  Total IN: 980 mL    OUT:  Total OUT: 0 mL    Total NET: 980 mL          PHYSICAL EXAM:  Gen: Patient laying in bed, alert, confused  HEENT: PERRL, anicteric sclera, no JVD, no thyromegaly  Cardio: +S1/S2, irregular, no murmurs  Resp: CTA b/l, no w/r/r  GI: +BS x4, NT/ND  Ext: no peripheral edema  Vasc: 2+ peripheral pulses  Skin: warm, dry, and intact. no rashes, wounds or scars  Neuro: AAOx1 (self)    LABS:                        5.9    14.73 )-----------( 355      ( 09 Sep 2022 22:20 )             19.0     09-09    143  |  108  |  98<H>  ----------------------------<  132<H>  4.2   |  22  |  3.63<H>    Ca    9.4      09 Sep 2022 22:20    TPro  4.5<L>  /  Alb  2.1<L>  /  TBili  0.6  /  DBili  x   /  AST  48<H>  /  ALT  27  /  AlkPhos  53  09-09    PT/INR - ( 09 Sep 2022 22:20 )   PT: 17.1 sec;   INR: 1.43          PTT - ( 09 Sep 2022 22:20 )  PTT:27.5 sec        CAPILLARY BLOOD GLUCOSE              RADIOLOGY & ADDITIONAL TESTS: Reviewed.

## 2022-09-10 NOTE — H&P ADULT - ASSESSMENT
89 y/o male PMH TIA, restrictive CM, CHF, HTN, HLD, Afib (on Eliquis), CKD (baseline Scr 2.0), BPH, urinary retention admitted to [facility] for melena and anemia requiring transfusion

## 2022-09-10 NOTE — PROGRESS NOTE ADULT - SUBJECTIVE AND OBJECTIVE BOX
***** TRANSFER ACCEPTANCE NOTE FROM [facility] TO [facility]*****    Hospital Course:   Mr. Mendes is a 89 y/o M with a PMHx of dementia, anemia, bladder CA, CVA, heart failure, Afib on Eliquis, chronic kidney disease, BPH presenting to [facility] after being sent in from nursing home for large amount of black tarry stool. On initial lab work, Hgb was 5.9 on admission requiring 3pRBCS in the ED which increased his Hgb to 9.4 and was started on PPI ggt for concern for GI bleed. GI was consulted and recommended a switch to oral Pantoprazole 40 mg q12h. CBC was reordered for 2 pm and anticoagulation has been held iso of GI bleed. He was provided Haldol 2.5 mg IV one time for agitation. Patient is medically stable to be stepped down to the [facility].     Subjective/ROS: Patient seen and examined at bedside. Patient repeatedly states "help me" without cooperating with interview or exam. Appears to be anxious.     ROS unable to be obtained.    VITALS  Vital Signs Last 24 Hrs  T(C): 36.9 (10 Sep 2022 15:42), Max: 37.2 (09 Sep 2022 23:40)  T(F): 98.4 (10 Sep 2022 15:42), Max: 98.9 (09 Sep 2022 23:40)  HR: 71 (10 Sep 2022 15:42) (71 - 118)  BP: 101/42 (10 Sep 2022 15:42) (101/42 - 169/90)  BP(mean): 84 (10 Sep 2022 14:55) (75 - 97)  RR: 16 (10 Sep 2022 15:42) (14 - 20)  SpO2: 93% (10 Sep 2022 15:42) (93% - 98%)    Parameters below as of 10 Sep 2022 15:42  Patient On (Oxygen Delivery Method): room air      CAPILLARY BLOOD GLUCOSE      PHYSICAL EXAM  General: Agitated, alert but not oriented  HEENT: PERRL, anicteric sclera; MMM  Cardiovascular: +S1/S2, RRR  Respiratory: CTA B/L; no W/R/R  Gastrointestinal: soft, NT/ND; +BSx4  Extremities: No edema, clubbing or cyanosis  Vascular: 2+ radial, PT pulses B/L  Neurological: Unable to assess mental status, patient uncooperative with exam, but dementia at baseline    MEDICATIONS  (STANDING):  acetaminophen     Tablet .. 650 milliGRAM(s) Oral every 6 hours  atorvastatin 10 milliGRAM(s) Oral at bedtime  fenofibrate Tablet 145 milliGRAM(s) Oral every 24 hours  finasteride 5 milliGRAM(s) Oral daily  pantoprazole  Injectable 40 milliGRAM(s) IV Push every 12 hours  QUEtiapine 25 milliGRAM(s) Oral at bedtime  sertraline 50 milliGRAM(s) Oral daily  tamsulosin 0.4 milliGRAM(s) Oral at bedtime    MEDICATIONS  (PRN):  HYDROmorphone   Tablet 2 milliGRAM(s) Oral every 6 hours PRN Severe Pain (7 - 10)      No Known Allergies      LABS                        9.4    13.24 )-----------( 257      ( 10 Sep 2022 06:35 )             28.9     09-10    143  |  112<H>  |  94<H>  ----------------------------<  99  4.4   |  19<L>  |  3.80<H>    Ca    9.1      10 Sep 2022 06:35  Phos  3.4     09-10  Mg     2.8     09-10    TPro  4.5<L>  /  Alb  2.1<L>  /  TBili  0.6  /  DBili  x   /  AST  48<H>  /  ALT  27  /  AlkPhos  53  09-09    PT/INR - ( 09 Sep 2022 22:20 )   PT: 17.1 sec;   INR: 1.43          PTT - ( 09 Sep 2022 22:20 )  PTT:27.5 sec            IMAGING/EKG/ETC

## 2022-09-10 NOTE — PROGRESS NOTE ADULT - SUBJECTIVE AND OBJECTIVE BOX
Interventional, Pulmonary, Critical, Chest Special Procedures. For  initial     Pt was seen and fully examined by myself.     Time spent with patient in minutes:137    Patient is a 89y old  Male who presents with a chief complaint of GIB (11 Sep 2022 13:33) The patient well known to me. Events leading to this admission reviewed. The patient obtunded, pale, ill appearing, not engaging     HPI:  89 y/o M with a PMHx of Dementia, Anemia, bladder CA, CVA, heart failure, Afib on Eliquis, chronic kidney disease, BPH presents to the ED after being sent in for large amount of black tarry stool. Pt with dementia and unable to contribute to history effectively. Pt complains of pain everywhere but unable to fully participate in ROS and subjective portion of assessment. Pt received 3u pRBCs in the ED without any further noted episodes of melena or hematochezia while in the ED.    In the ED:  Initial vital signs: T: 98.3F, HR: 73, BP: 113/55, R: 18, SpO2: 97% on RA  Labs: significant for WBC 14.73 w/ neutrophil predominance 91.4%, Hgb 7.2>>5.9, Hct 19, .1, Plt 355, PT 17.1, INR 1.43, BUN 98, Cr 3.63, GFR 15  Imaging:   CXR: no acute cardiopulmonary findings, cardiomegaly  EKG: Afib | , QTc 476  Medications: protonix gtt, 3U pRBCs, KCentra  Consults: GI (10 Sep 2022 00:58)      REVIEW OF SYSTEMS:  Constitutional: No fever, weight loss, chills + fatigue  Eyes: No eye pain, visual disturbances, or discharge  ENMT:  + difficulty hearing, tinnitus, vertigo; No sinus or throat pain. No epistaxis, +dysphagia, dysphonia, hoarseness no odynophagia  Neck: No pain, stiffness or neck swelling.  No masses or deformities  Respiratory: +cough, no wheezing, hemoptysis  - COPD  - ILD   - PE   - ASTHMA     - PNEUMONIA  Cardiovascular: No chest pain, AF dysrhythmia, palpitations, dizziness or edema - CAD   + CHF   + HTN  Gastrointestinal: No abdominal or epigastric pain. No nausea, vomiting or hematemesis; No diarrhea or constipation. No melena or hematochezia, Icterus.          Genitourinary: No dysuria, frequency, hematuria or incontinence   +CKD/SADIQ      - ESRD  Neurological: No headaches, memory loss, loss of strength, numbness or tremors      +DEMENTIA     - STROKE    - SEIZURE  Skin: No itching, burning, rashes or lesions   Lymph Nodes: No enlarged glands  Endocrine: No heat or cold intolerance; No hair loss       - DM     - THYROID DISORDER  Musculoskeletal: No joint pain or swelling; No muscle, back or extremity pain, No edema  Psychiatric: No depression, anxiety, mood swings or difficulty sleeping  Heme/Lymph: No easy bruising or bleeding gums         - ANEMIA      - CANCER   -COAGULOPATHY  Allergy and Immunologic: No hives or eczema    PAST MEDICAL & SURGICAL HISTORY:  Chronic CHF      Hypertension      Hyperlipidemia      Chronic kidney disease (CKD)      BPH (benign prostatic hyperplasia)      Chronic atrial fibrillation        FAMILY HISTORY:    SOCIAL HISTORY:      - Tobacco     - ETOH    Allergies    No Known Allergies    Intolerances      Vital Signs Last 24 Hrs  T(C): 36.5 (11 Sep 2022 09:52), Max: 36.9 (10 Sep 2022 15:42)  T(F): 97.7 (11 Sep 2022 09:52), Max: 98.4 (10 Sep 2022 15:42)  HR: 88 (11 Sep 2022 09:52) (66 - 94)  BP: 109/60 (11 Sep 2022 09:52) (100/44 - 134/93)  BP(mean): 84 (10 Sep 2022 14:55) (84 - 84)  RR: 16 (11 Sep 2022 09:52) (16 - 18)  SpO2: 98% (11 Sep 2022 09:52) (93% - 98%)    Parameters below as of 11 Sep 2022 09:52  Patient On (Oxygen Delivery Method): room air        09-10 @ 07:01  -  09-11 @ 07:00  --------------------------------------------------------  IN: 40 mL / OUT: 0 mL / NET: 40 mL          MEDICATIONS:  MEDICATIONS  (STANDING):  acetaminophen     Tablet .. 650 milliGRAM(s) Oral every 6 hours  atorvastatin 10 milliGRAM(s) Oral at bedtime  fenofibrate Tablet 145 milliGRAM(s) Oral every 24 hours  finasteride 5 milliGRAM(s) Oral daily  lactated ringers. 1000 milliLiter(s) (100 mL/Hr) IV Continuous <Continuous>  pantoprazole  Injectable 40 milliGRAM(s) IV Push every 12 hours  potassium chloride   Powder 40 milliEquivalent(s) Oral once  QUEtiapine 25 milliGRAM(s) Oral at bedtime  sertraline 50 milliGRAM(s) Oral daily  tamsulosin 0.4 milliGRAM(s) Oral at bedtime    MEDICATIONS  (PRN):      PHYSICAL EXAM:  Un Comfortable, no immediate distress  Eyes: PERRL, EOM intact; conjunctiva and sclera clear  Head: Normocephalic;  No Trauma  ENMT: No nasal discharge, hoarseness, +cough no hemoptysis  Neck: Supple; non tender; no masses or deformities.    No JVD  Respiratory:   - WHEEZING   + RHONCHI  - RALES  + CRACKLES.  Diminished breath sounds  BILATERAL  RIGHT  LEFT bases   Cardiovascular: Regular rate and rhythm. S1 and S2 Normal; No murmurs, gallops or rubs     - PPM/AICD  Gastrointestinal: Soft non-tender, non-distended; Normal bowel sounds; No hepatosplenomegaly.     -PEG    -  GT   - MOMIN  Genitourinary: No costovertebral angle tenderness. No dysuria  Extremities: AROM, No clubbing, cyanosis or edema    Vascular: Peripheral pulses palpable 2+ bilaterally  Neurological: Alert and responisve to stimuli   Skin: Warm and dry. No obvious rash  Lymph Nodes: No acute cervical or supraclavicular adenopathy  Psychiatric:m not really engaging    DEVICES:  - DENTURES   +IV R / L     - ETUBE   -TRACH   -CTUBE  R / L    LABS:      TPro  4.4<L>  /  Alb  2.1<L>  /  TBili  0.9  /  DBili  x   /  AST  37  /  ALT  25  /  AlkPhos  49  09-11    PT/INR - ( 09 Sep 2022 22:20 )   PT: 17.1 sec;   INR: 1.43          PTT - ( 09 Sep 2022 22:20 )  PTT:27.5 sec  RADIOLOGY & ADDITIONAL STUDIES (The following images were personally reviewed): < from: Xray Chest 1 View- PORTABLE-Urgent (Xray Chest 1 View- PORTABLE-Urgent .) (09.09.22 @ 22:34) >  ACC: [id number] EXAM:  XR CHEST PORTABLE URGENT 1V                          PROCEDURE DATE:  09/09/2022          INTERPRETATION:  Clinical History: GI bleed    Frontal examination of the chest demonstrates the heart to be within   normal limits in transverse diameter. Silhouetting left hemidiaphragm   which reflect infiltrate and/or effusion. Degenerative changes thoracic   spine. Calcification aortic knob.    IMPRESSION: Silhouetting left hemidiaphragm which may reflect infiltrate   and/or effusion    < end of copied text >

## 2022-09-10 NOTE — CONSULT NOTE ADULT - ASSESSMENT
89yo M w/ a PMH TIA, restrictive CM, CHF, HTN, HLD, Afib (on Eliquis), CKD (baseline Scr 2.0), BPH, urinary retention presenting from nursing home after episode of melena. Found to have Hgb of 5.9 (previously 7.2 one week prior to admission. ICU consulted for UGIB.     #UGIB  Patient presented to ED w/ melena. Hgb dropped from 7.2 on Sept 4 to 5.9 on day of admission.  and Plt 355. INR 1.43. Patient on home eliquis which was reversed in ED. He has a history of dementia and was unable to fully answer provider's questions.   -GI consulted in ED, f/u recs  -2U pRBC  -PPI  -trend CBC  -maintain active T&S  -transfuse if Hgb <7  -recommend holding eliquis and antihypertensives      Dispo: 7L    All recommendations are considered final after attending attestation.

## 2022-09-11 NOTE — PROGRESS NOTE ADULT - PROBLEM SELECTOR PROBLEM 5
Chronic HFrEF (heart failure with reduced ejection fraction)
Chronic atrial fibrillation
Chronic atrial fibrillation

## 2022-09-11 NOTE — PROGRESS NOTE ADULT - SUBJECTIVE AND OBJECTIVE BOX
INTERNAL MEDICINE PROGRESS NOTE    INTERVAL HPI/OVERNIGHT EVENTS:  Patient seen and examined at bedside. Patient states that he hurts all over. He reports eating some breakfast without n/v. One small amount of dark stool overnight.     VITAL SIGNS:  T(F): 97.7 (09-11-22 @ 09:52)  HR: 88 (09-11-22 @ 09:52)  BP: 109/60 (09-11-22 @ 09:52)  RR: 16 (09-11-22 @ 09:52)  SpO2: 98% (09-11-22 @ 09:52)  Wt(kg): --    PHYSICAL EXAM:  Constitutional: NAD  Head: NC/AT  EENT: PERRL, anicteric sclera; oropharynx clear, MMM  Neck: supple, no appreciable JVD  Respiratory: CTA B/L; no W/R/R  Cardiovascular: +S1/S2, RRR  Gastrointestinal: soft, NT/ND  Extremities: RUE erythema and edema, no clubbing or cyanosis  Neurological: awake answering some questions appropriately, AAOx1    MEDICATIONS  (STANDING):  acetaminophen     Tablet .. 650 milliGRAM(s) Oral every 6 hours  atorvastatin 10 milliGRAM(s) Oral at bedtime  fenofibrate Tablet 145 milliGRAM(s) Oral every 24 hours  finasteride 5 milliGRAM(s) Oral daily  lactated ringers. 1000 milliLiter(s) (100 mL/Hr) IV Continuous <Continuous>  pantoprazole  Injectable 40 milliGRAM(s) IV Push every 12 hours  potassium chloride   Powder 40 milliEquivalent(s) Oral once  QUEtiapine 25 milliGRAM(s) Oral at bedtime  sertraline 50 milliGRAM(s) Oral daily  tamsulosin 0.4 milliGRAM(s) Oral at bedtime    MEDICATIONS  (PRN):      Allergies    No Known Allergies    Intolerances        LABS:                        9.2    14.95 )-----------( 258      ( 11 Sep 2022 11:34 )             28.2     09-11    144  |  111<H>  |  103<H>  ----------------------------<  112<H>  3.6   |  21<L>  |  4.05<H>    Ca    9.2      11 Sep 2022 11:34  Phos  3.2     09-11  Mg     2.4     09-11    TPro  4.4<L>  /  Alb  2.1<L>  /  TBili  0.9  /  DBili  x   /  AST  37  /  ALT  25  /  AlkPhos  49  09-11    PT/INR - ( 09 Sep 2022 22:20 )   PT: 17.1 sec;   INR: 1.43          PTT - ( 09 Sep 2022 22:20 )  PTT:27.5 sec      RADIOLOGY & ADDITIONAL TESTS: Reviewed

## 2022-09-11 NOTE — PATIENT PROFILE ADULT - FALL HARM RISK - HARM RISK INTERVENTIONS
Assistance with ambulation/Assistance OOB with selected safe patient handling equipment/Communicate Risk of Fall with Harm to all staff/Discuss with provider need for PT consult/Monitor gait and stability/Reinforce activity limits and safety measures with patient and family/Tailored Fall Risk Interventions/Visual Cue: Yellow wristband and red socks/Bed in lowest position, wheels locked, appropriate side rails in place/Call bell, personal items and telephone in reach/Instruct patient to call for assistance before getting out of bed or chair/Non-slip footwear when patient is out of bed/Cut Off to call system/Physically safe environment - no spills, clutter or unnecessary equipment/Purposeful Proactive Rounding/Room/bathroom lighting operational, light cord in reach

## 2022-09-11 NOTE — PROGRESS NOTE ADULT - PROBLEM SELECTOR PLAN 11
F: s/p 3units pRBCs  E: replete K<4, Mg<2  N: NPO  D: SCDs    Dispo:  [facility]

## 2022-09-11 NOTE — PROGRESS NOTE ADULT - PROBLEM SELECTOR PLAN 7
Home medications Coreg 3.125mg BID, Enalapril 5mg qd, Lasix 20mg qd, Hydralazine 50mg TID were held following previous admission due to SADIQ. Normotensive on admission    Plan:  - Hold BP meds i/s/o SADIQ and soft pressures
Home medication Lipitor 10mg qd    Plan:  - Hold until diet resumed
Home medication Lipitor 10mg qd    Plan:  - Hold until diet resumed

## 2022-09-11 NOTE — PROGRESS NOTE ADULT - ASSESSMENT
89 y/o male PMH TIA, restrictive CM, CHF, HTN, HLD, Afib (on Eliquis), CKD (baseline Scr 2.0), BPH, urinary retention admitted to [facility] for melena and anemia requiring transfusion 89 y/o male PMH TIA, restrictive CM, CHF, HTN, HLD, Afib (on Eliquis), CKD (baseline Scr 2.0), BPH, urinary retention admitted to [facility] for melena and anemia requiring transfusion, now stable and transferred to [facility].

## 2022-09-11 NOTE — PATIENT PROFILE ADULT - FLU SEASON?
54 yr old male with hx of DM, HTN presents with right index finger swelling and redness x 1 week. Pt reports about 2 weeks ago, fish hook stuck in finger, pt removed on his own. Pt reports about 6 days ago symptoms started. Pt seen by pmd and was started on doxycycline, which he took 5 days of. Pt reports not improvement in symptoms. no fever. tetanus unknown. right hand dominant. Pt reports there was a scab to area which he open himself and " pus came out". in ER, VS normal. patient was afebrile. wbc normal. patient had no complaints except above. hospitalist service was called to see whetehr needs IV antibiotic and hence admission.    seen and examined at bedside. reproted right index finger swelling and focal TP over second phalanx. no pain or tenderness oor redness over any joints. BS below 200 at home. complaints with meds    ROS:  no fever/chills/CP/sob/palpitation/dizziness/ abd pain/nausea/vomiting/diarrhea/constipation/headaches. all other ROS neg.    allergy: NKDA    PAST MEDICAL HISTORY:  Diabetes mellitus     Gastric ulcer     Hypertension.     PAST SURGICAL HISTORY:  No significant past surgical history.    meds;  ·  Lantus metformin, Trulicity [ incomplete med list]    Social:  smoking: denied      Vital Signs Last 24 Hrs  T(C): 36.8 (24 Oct 2021 11:23), Max: 36.8 (24 Oct 2021 11:23)  T(F): 98.3 (24 Oct 2021 11:23), Max: 98.3 (24 Oct 2021 11:23)  HR: 95 (24 Oct 2021 11:23) (95 - 95)  BP: 127/87 (24 Oct 2021 11:23) (127/87 - 127/87)  BP(mean): --  RR: 18 (24 Oct 2021 11:23) (18 - 18)  SpO2: 100% (24 Oct 2021 11:23) (100% - 100%)    GENERAL: Not in distress. Alert . afebrile. non-toxic looking  HEENT: Clear conjuctiva, MMM. supple   CVS: NAD. PP+  EXTREMITIES: no edema. no leg or calf TP. right index finger mild swelling. focal area of redness and TP over middle phalanx with healed scab. joints NAD. cap refill <2 sec  SKIN: no rash. or skin break or ulcer. + cellulitis.  NEUROLOGIC: AAO*3. grossly intact.    PSYCHIATRIC: Calm.  No agitation.          
Yes...

## 2022-09-11 NOTE — PROGRESS NOTE ADULT - PROBLEM SELECTOR PLAN 3
Patient is agitated at baseline w/ history of dementia.     - Haldol 2.5 mg IV provided once with effect  - Start seroquel 25mg at bedtime, reassess in AM
Patient is agitated at baseline w/ history of dementia.     - Haldol 2.5 mg IV provided once
Patient is agitated at baseline w/ history of dementia.   - seroquel 25mg at bedtime

## 2022-09-11 NOTE — PROGRESS NOTE ADULT - ASSESSMENT
89 yo M with PMhx TIA, restrictive cardiomyopathy/chronic HFmEF, Afib on Eliquis, CKD (baseline SCr 2), admitted for melena and associated anemia.    #GIB: Presumably UGI source given melena with 1 small volume dark stool noted overnight.  Hgb stable 9.4-->9.1 since 3 units pRBC transfused for Hgb 5.9.  Hemodynamically stable.    Pts daughter (POA) wishes to defer endoscopy at this point with focus on med mgmt and comfort.  Small melena at this time and hemodynamic stability reassuring.    Recommendations:  -C/w PPI BID  -Please repeat CBC/BMP today  -Maintain active T &S, transfusion threshold per primary team  -Continue to hold anticoag in setting of GIB  -Defer endoscopic eval given pt's expressed wishes as per POA; re-eval with change in status    RUE edema noted on exam d/w with IM team. Diagnostic eval pending.  Further workup per hospitalist team.    Vega Peoples DO, FACP  Gastroenterology Fellow  Pager: [phone]

## 2022-09-11 NOTE — PROGRESS NOTE ADULT - PROBLEM SELECTOR PLAN 9
Home meds: Sertraline 50mg     Plan:  - Continue home meds
Home meds: finasteride 5 mg qd and Flomax 0.4 mg qd    Plan:  - Cont home meds
Home meds: Sertraline 50mg     Plan:  - Continue home meds

## 2022-09-11 NOTE — PROGRESS NOTE ADULT - PROBLEM SELECTOR PLAN 6
Home medication: Eliquis 2.5mg BID    Plan:  - Hold i/s/o GIB
Home medications Coreg 3.125mg BID, Enalapril 5mg qd, Lasix 20mg qd, Hydralazine 50mg TID were held following previous admission due to SADIQ. Normotensive on admission    Plan:  - Cont home meds
Home medications Coreg 3.125mg BID, Enalapril 5mg qd, Lasix 20mg qd, Hydralazine 50mg TID were held following previous admission due to SADIQ. Normotensive on admission    Plan:  - Hold BP meds i/s/o SADIQ and soft pressures

## 2022-09-11 NOTE — PROGRESS NOTE ADULT - ASSESSMENT
ASSESSMENT/PLAN89 y/o male PMH TIA, restrictive CM, CHF, HTN, HLD, Afib (on Eliquis), CKD (baseline Scr 2.0), BPH, urinary retention, limited mobility, brought from [facility] for melena, symptomatic anemia c  sob, , recent aspiration pneumonitis,  UTI, SADIQ on CKD     1. O2 continue  off   2. Bronchodilators:  Atrovent/ albuterol q 4 – 6 hours as needed  3. Corticosteroids: off  4. ID/Antibiotics:   5. Cardiac/HTN: optimize BP   6. GI: Rx/ prophylaxis c PPI/H2B  7. Heme: Rx/VT prophylaxis c SQH/SCD/hold  Eliquis, use SCD if Doppler negative   8. Aspiration precautions,   9. Nephrology feedback     Discussed c managing team

## 2022-09-11 NOTE — PROGRESS NOTE ADULT - PROBLEM SELECTOR PLAN 1
Pt presenting from [facility] with melena, Hgb 7.6 on arrival to the ED with downtrend to 5.9 while in ED. S/p 3UpRBC's w/ Hgb now to 9.4. Hematemesis not reported on admission, however, pt noted to have dark residue on tongue and periorally. Was placed on PPI gtt. Pt s/p Eliquis reversal. Complains of pain all over but can be 2/2 to dementia.    Plan:  - PPI 40 mg q12h  - F/U GI recs  - Transfuse goal <7  - Maintain active T&S  - Acetaminophen 650 mg q6h standing   - Trend CBC

## 2022-09-11 NOTE — PROGRESS NOTE ADULT - PROBLEM SELECTOR PLAN 4
Patient with new edema of RUE. Differentials include infiltration of IV VS DVT  -remove RUE IV and replace in LUE  -RUE doppler
Hx of HFrEF, pr does not appear to be in active exacerbation. Home meds discontinued on previous admission 2/2 to SADIQ on CKD. TTE 8/2022: EF 45-50%    Plan:  - Transfuse pRBCs slowly
Hx of HFrEF, pr does not appear to be in active exacerbation. Home meds discontinued on previous admission 2/2 to SADIQ on CKD. TTE 8/2022: EF 45-50%    Plan:  - Transfuse pRBCs slowly

## 2022-09-11 NOTE — PROGRESS NOTE ADULT - PROBLEM SELECTOR PLAN 2
Cr 3.63 on admission w/ most recent cr of 3.80. Pt noted to have ATN 2/2 SADIQ during previous admission initially pre-renal iso poor PO intake and poor perfusion but also found to have urinary retention requiring rosenbaum w/ progression to post renal SADIQ. Possible etiology of SADIQ during this admission is pre-renal in the setting of hypovolemia 2/2 to GI losses vs post-renal 2/2 continued obstruction.    Plan:   - F/U bladder scans  - F/U urine studies  - Avoid nephrotoxic drugs  - Strict I/Os
Cr 3.63 on admission w/ most recent cr of 3.80. Pt noted to have ATN 2/2 SADIQ during previous admission initially pre-renal iso poor PO intake and poor perfusion but also found to have urinary retention requiring rosenbaum w/ progression to post renal SADIQ. Possible etiology of SADIQ during this admission is pre-renal in the setting of hypovolemia 2/2 to GI losses vs post-renal 2/2 continued obstruction.    Plan:   - Avoid nephrotoxic drugs  - Strict I/Os
Cr 3.63 on admission w/ most recent cr of 3.80. Pt noted to have ATN 2/2 SADIQ during previous admission initially pre-renal iso poor PO intake and poor perfusion but also found to have urinary retention requiring rosenbaum w/ progression to post renal SADIQ. Possible etiology of SADIQ during this admission is pre-renal in the setting of hypovolemia 2/2 to GI losses vs post-renal 2/2 continued obstruction.    Plan:   - F/U bladder scans  - F/U urine studies  - Avoid nephrotoxic drugs  - Strict I/Os

## 2022-09-11 NOTE — PROGRESS NOTE ADULT - PROBLEM SELECTOR PLAN 8
Home meds: finasteride 5 mg qd and Flomax 0.4 mg qd    Plan:  - Cont home meds
Home meds: finasteride 5 mg qd and Flomax 0.4 mg qd    Plan:  - Cont home meds
Home medication Lipitor 10mg qd    Plan:  - Hold until diet resumed

## 2022-09-11 NOTE — PROGRESS NOTE ADULT - PROBLEM SELECTOR PROBLEM 4
Edema of right upper extremity
Chronic HFrEF (heart failure with reduced ejection fraction)
Chronic HFrEF (heart failure with reduced ejection fraction)

## 2022-09-11 NOTE — PROGRESS NOTE ADULT - SUBJECTIVE AND OBJECTIVE BOX
Pt seen and examined at bedside.  Reporting pain and swelling in R arm. New since admission.  Denies abdominal discomfort.  1 small amount of dark stool noted by RN.    Allergies    No Known Allergies    Intolerances        MEDICATIONS:  MEDICATIONS  (STANDING):  acetaminophen     Tablet .. 650 milliGRAM(s) Oral every 6 hours  atorvastatin 10 milliGRAM(s) Oral at bedtime  fenofibrate Tablet 145 milliGRAM(s) Oral every 24 hours  finasteride 5 milliGRAM(s) Oral daily  pantoprazole  Injectable 40 milliGRAM(s) IV Push every 12 hours  QUEtiapine 25 milliGRAM(s) Oral at bedtime  sertraline 50 milliGRAM(s) Oral daily  tamsulosin 0.4 milliGRAM(s) Oral at bedtime    MEDICATIONS  (PRN):  HYDROmorphone   Tablet 2 milliGRAM(s) Oral every 6 hours PRN Severe Pain (7 - 10)      Vital Signs Last 24 Hrs  T(C): 36.5 (11 Sep 2022 09:52), Max: 36.9 (10 Sep 2022 15:42)  T(F): 97.7 (11 Sep 2022 09:52), Max: 98.4 (10 Sep 2022 15:42)  HR: 88 (11 Sep 2022 09:52) (66 - 94)  BP: 109/60 (11 Sep 2022 09:52) (100/44 - 146/65)  BP(mean): 84 (10 Sep 2022 14:55) (84 - 94)  RR: 16 (11 Sep 2022 09:52) (16 - 18)  SpO2: 98% (11 Sep 2022 09:52) (93% - 98%)    Parameters below as of 11 Sep 2022 09:52  Patient On (Oxygen Delivery Method): room air        09-10 @ 07:01  -  09-11 @ 07:00  --------------------------------------------------------  IN: 40 mL / OUT: 0 mL / NET: 40 mL        PHYSICAL EXAM:    General: No acute distress  HEENT: MMM, conjunctiva and sclera clear  Gastrointestinal: Soft non-tender non-distended. No rebound or guarding  Extremities: Edematous RUE from shoulder to hand, darker in color compared to L  Skin: Warm and dry.     LABS:  CBC Full  -  ( 10 Sep 2022 18:37 )  WBC Count : 14.26 K/uL  RBC Count : 2.86 M/uL  Hemoglobin : 9.1 g/dL  Hematocrit : 27.5 %  Platelet Count - Automated : 285 K/uL  Mean Cell Volume : 96.2 fl  Mean Cell Hemoglobin : 31.8 pg  Mean Cell Hemoglobin Concentration : 33.1 gm/dL  Auto Neutrophil # : x  Auto Lymphocyte # : x  Auto Monocyte # : x  Auto Eosinophil # : x  Auto Basophil # : x  Auto Neutrophil % : x  Auto Lymphocyte % : x  Auto Monocyte % : x  Auto Eosinophil % : x  Auto Basophil % : x    09-10    143  |  112<H>  |  94<H>  ----------------------------<  99  4.4   |  19<L>  |  3.80<H>    Ca    9.1      10 Sep 2022 06:35  Phos  3.4     09-10  Mg     2.8     09-10    TPro  4.5<L>  /  Alb  2.1<L>  /  TBili  0.6  /  DBili  x   /  AST  48<H>  /  ALT  27  /  AlkPhos  53  09-09    PT/INR - ( 09 Sep 2022 22:20 )   PT: 17.1 sec;   INR: 1.43          PTT - ( 09 Sep 2022 22:20 )  PTT:27.5 sec

## 2022-09-11 NOTE — PROGRESS NOTE ADULT - SUBJECTIVE AND OBJECTIVE BOX
Interventional, Pulmonary, Critical, Chest Special Procedures.    Pt was seen and fully examined by myself.     Time spent with patient in minutes: 67    Patient is a 89y old  Male who presents with a chief complaint of GIB (11 Sep 2022 10:19) The patient more awake today, sinle word answers, ill appearing.     HPI:  89 y/o M with a PMHx of Dementia, Anemia, bladder CA, CVA, heart failure, Afib on Eliquis, chronic kidney disease, BPH presents to the ED after being sent in for large amount of black tarry stool. Pt with dementia and unable to contribute to history effectively. Pt complains of pain everywhere but unable to fully participate in ROS and subjective portion of assessment. Pt received 3u pRBCs in the ED without any further noted episodes of melena or hematochezia while in the ED.    In the ED:  Initial vital signs: T: 98.3F, HR: 73, BP: 113/55, R: 18, SpO2: 97% on RA  Labs: significant for WBC 14.73 w/ neutrophil predominance 91.4%, Hgb 7.2>>5.9, Hct 19, .1, Plt 355, PT 17.1, INR 1.43, BUN 98, Cr 3.63, GFR 15  Imaging:   CXR: no acute cardiopulmonary findings, cardiomegaly  EKG: Afib | , QTc 476  Medications: protonix gtt, 3U pRBCs, KCentra  Consults: GI (10 Sep 2022 00:58)      REVIEW OF SYSTEMS:  Constitutional: No fever, weight loss, chills + fatigue  Eyes: No eye pain, visual disturbances, or discharge  ENMT:  + difficulty hearing, tinnitus, vertigo; No sinus or throat pain. No epistaxis, +dysphagia, dysphonia, hoarseness no odynophagia  Neck: No pain, stiffness or neck swelling.  No masses or deformities  Respiratory: +cough, no wheezing, hemoptysis  - COPD  - ILD   - PE   - ASTHMA     - PNEUMONIA  Cardiovascular: No chest pain, AF dysrhythmia, palpitations, dizziness or edema - CAD   + CHF   + HTN  Gastrointestinal: No abdominal or epigastric pain. No nausea, vomiting or hematemesis; No diarrhea or constipation. No melena or hematochezia, Icterus.          Genitourinary: No dysuria, frequency, hematuria or incontinence   +CKD/SADIQ      - ESRD  Neurological: No headaches, memory loss, loss of strength, numbness or tremors      +DEMENTIA     - STROKE    - SEIZURE  Skin: No itching, burning, rashes or lesions   Lymph Nodes: No enlarged glands  Endocrine: No heat or cold intolerance; No hair loss       - DM     - THYROID DISORDER  Musculoskeletal: No joint pain or swelling; No muscle, back or extremity pain, No edema  Psychiatric: No depression, anxiety, mood swings or difficulty sleeping  Heme/Lymph: No easy bruising or bleeding gums         - ANEMIA      - CANCER   -COAGULOPATHY  Allergy and Immunologic: No hives or eczema    PAST MEDICAL & SURGICAL HISTORY:  Chronic CHF      Hypertension      Hyperlipidemia      Chronic kidney disease (CKD)      BPH (benign prostatic hyperplasia)      Chronic atrial fibrillation        FAMILY HISTORY:    SOCIAL HISTORY:      - Tobacco     - ETOH    Allergies    No Known Allergies    Intolerances      Vital Signs Last 24 Hrs  T(C): 36.5 (11 Sep 2022 09:52), Max: 36.9 (10 Sep 2022 15:42)  T(F): 97.7 (11 Sep 2022 09:52), Max: 98.4 (10 Sep 2022 15:42)  HR: 88 (11 Sep 2022 09:52) (66 - 94)  BP: 109/60 (11 Sep 2022 09:52) (100/44 - 134/93)  BP(mean): 84 (10 Sep 2022 14:55) (84 - 84)  RR: 16 (11 Sep 2022 09:52) (16 - 18)  SpO2: 98% (11 Sep 2022 09:52) (93% - 98%)    Parameters below as of 11 Sep 2022 09:52  Patient On (Oxygen Delivery Method): room air        09-10 @ 07:01  -  09-11 @ 07:00  --------------------------------------------------------  IN: 40 mL / OUT: 0 mL / NET: 40 mL          MEDICATIONS:  MEDICATIONS  (STANDING):  acetaminophen     Tablet .. 650 milliGRAM(s) Oral every 6 hours  atorvastatin 10 milliGRAM(s) Oral at bedtime  fenofibrate Tablet 145 milliGRAM(s) Oral every 24 hours  finasteride 5 milliGRAM(s) Oral daily  lactated ringers. 1000 milliLiter(s) (100 mL/Hr) IV Continuous <Continuous>  pantoprazole  Injectable 40 milliGRAM(s) IV Push every 12 hours  potassium chloride   Powder 40 milliEquivalent(s) Oral once  QUEtiapine 25 milliGRAM(s) Oral at bedtime  sertraline 50 milliGRAM(s) Oral daily  tamsulosin 0.4 milliGRAM(s) Oral at bedtime    MEDICATIONS  (PRN):      PHYSICAL EXAM:  Un Comfortable, no distress  Eyes: PERRL, EOM intact; conjunctiva and sclera clear  Head: Normocephalic;  No Trauma  ENMT: No nasal discharge, hoarseness, +cough no hemoptysis  Neck: Supple; non tender; no masses or deformities.    No JVD  Respiratory:   - WHEEZING   + RHONCHI  - RALES  + CRACKLES.  Diminished breath sounds  BILATERAL  RIGHT  LEFT bases   Cardiovascular: Regular rate and rhythm. S1 and S2 Normal; No murmurs, gallops or rubs     - PPM/AICD  Gastrointestinal: Soft non-tender, non-distended; Normal bowel sounds; No hepatosplenomegaly.     -PEG    -  GT   - MOMIN  Genitourinary: No costovertebral angle tenderness. No dysuria  Extremities: AROM, No clubbing, cyanosis or edema    Vascular: Peripheral pulses palpable 2+ bilaterally  Neurological: Alert and responisve to stimuli   Skin: Warm and dry. No obvious rash  Lymph Nodes: No acute cervical or supraclavicular adenopathy  Psychiatric:m not really engaging    DEVICES:  - DENTURES   +IV R / L     - ETUBE   -TRACH   -CTUBE  R / L    LABS:                          9.2    14.95 )-----------( 258      ( 11 Sep 2022 11:34 )             28.2     09-11    144  |  111<H>  |  103<H>  ----------------------------<  112<H>  3.6   |  21<L>  |  4.05<H>    Ca    9.2      11 Sep 2022 11:34  Phos  3.2     09-11  Mg     2.4     09-11    TPro  4.4<L>  /  Alb  2.1<L>  /  TBili  0.9  /  DBili  x   /  AST  37  /  ALT  25  /  AlkPhos  49  09-11    PT/INR - ( 09 Sep 2022 22:20 )   PT: 17.1 sec;   INR: 1.43          PTT - ( 09 Sep 2022 22:20 )  PTT:27.5 sec  RADIOLOGY & ADDITIONAL STUDIES (The following images were personally reviewed):

## 2022-09-11 NOTE — PROGRESS NOTE ADULT - PROBLEM SELECTOR PLAN 10
Home meds: Sertraline 50mg     Plan:  - Continue home meds
F: pending 2U pRBCs  E: replete K<4, Mg<2  N: NPO  D: SCDs    Dispo: 7L
F: s/p 3units pRBCs  E: replete K<4, Mg<2  N: NPO  D: SCDs    Dispo:  7L -> [facility]

## 2022-09-11 NOTE — PATIENT PROFILE ADULT - NSPROMEDSADMININFO_GEN_A_NUR
"    Dear Gagan Herrera    After reviewing your responses, I've been able to diagnose you with \"Bronchitis\" which is a common infection of your lungs. While this is most commonly caused by a virus, the symptoms you have given suggest you should be treated with antibiotics.     I have sent zpak and tessalon perles to your pharmacy to treat this infection.     It is important that you take all of your prescribed medication even if your symptoms are improving after a few doses. Taking all of your medicine helps prevent the symptoms from returning.     If your symptoms worsen, you develop chest pain or shortness of breath, fevers over 101, or are not improving in 5 days, please contact your primary care provider for an appointment or visit any of our convenient Walk-in Care or Urgent Care Centers to be seen which can be found on our website here.    Thanks again for choosing us as your health care partner,    Luz Marina Cuellar    " crush pills for administration

## 2022-09-11 NOTE — PROGRESS NOTE ADULT - PROBLEM SELECTOR PLAN 5
Hx of HFrEF, pr does not appear to be in active exacerbation. Home meds discontinued on previous admission 2/2 to SADIQ on CKD. TTE 8/2022: EF 45-50%    Plan:  - caution with fluid administration
Home medication: Eliquis 2.5mg BID    Plan:  - Hold i/s/o GIB
Home medication: Eliquis 2.5mg BID    Plan:  - Hold i/s/o GIB

## 2022-09-12 NOTE — DISCHARGE NOTE PROVIDER - INSTRUCTIONS
Supplement with Ensure as tolerated  Nephro-liz tablet  Pending goals of care discussion, modified barium swallow vs comfort feeds Supplement with Ensure clear BID as tolerated  Nephro-liz tablet  Pending goals of care discussion, modified barium swallow to help decide on PO diet, vs comfort feeds

## 2022-09-12 NOTE — PROGRESS NOTE ADULT - SUBJECTIVE AND OBJECTIVE BOX
Interventional, Pulmonary, Critical, Chest Special Procedures.    Pt was seen and fully examined by myself.     Time spent with patient in minutes:32    Patient is a 89y old  Male who presents with a chief complaint of GIB (12 Sep 2022 10:41) The patient ill appearing,, more awake today and eupneic on RA when seen.     HPI:  89 y/o M with a PMHx of Dementia, Anemia, bladder CA, CVA, heart failure, Afib on Eliquis, chronic kidney disease, BPH presents to the ED after being sent in for large amount of black tarry stool. Pt with dementia and unable to contribute to history effectively. Pt complains of pain everywhere but unable to fully participate in ROS and subjective portion of assessment. Pt received 3u pRBCs in the ED without any further noted episodes of melena or hematochezia while in the ED.    In the ED:  Initial vital signs: T: 98.3F, HR: 73, BP: 113/55, R: 18, SpO2: 97% on RA  Labs: significant for WBC 14.73 w/ neutrophil predominance 91.4%, Hgb 7.2>>5.9, Hct 19, .1, Plt 355, PT 17.1, INR 1.43, BUN 98, Cr 3.63, GFR 15  Imaging:   CXR: no acute cardiopulmonary findings, cardiomegaly  EKG: Afib | , QTc 476  Medications: protonix gtt, 3U pRBCs, KCentra  Consults: GI (10 Sep 2022 00:58)      REVIEW OF SYSTEMS:  Constitutional: No fever, weight loss, chills + fatigue  Eyes: No eye pain, visual disturbances, or discharge  ENMT:  + difficulty hearing, tinnitus, vertigo; No sinus or throat pain. No epistaxis, +dysphagia, dysphonia, hoarseness no odynophagia  Neck: No pain, stiffness or neck swelling.  No masses or deformities  Respiratory: +cough, no wheezing, hemoptysis  - COPD  - ILD   - PE   - ASTHMA     - PNEUMONIA  Cardiovascular: No chest pain, AF dysrhythmia, palpitations, dizziness or edema - CAD   + CHF   + HTN  Gastrointestinal: No abdominal or epigastric pain. No nausea, vomiting or hematemesis; No diarrhea or constipation. No melena or hematochezia, Icterus.          Genitourinary: No dysuria, frequency, hematuria or incontinence   +CKD/SADIQ      - ESRD  Neurological: No headaches, memory loss, loss of strength, numbness or tremors      +DEMENTIA     - STROKE    - SEIZURE  Skin: No itching, burning, rashes or lesions   Lymph Nodes: No enlarged glands  Endocrine: No heat or cold intolerance; No hair loss       - DM     - THYROID DISORDER  Musculoskeletal: No joint pain or swelling; No muscle, back or extremity pain, No edema  Psychiatric: No depression, anxiety, mood swings or difficulty sleeping  Heme/Lymph: No easy bruising or bleeding gums         - ANEMIA      - CANCER   -COAGULOPATHY  Allergy and Immunologic: No hives or eczema    PAST MEDICAL & SURGICAL HISTORY:  Chronic CHF      Hypertension      Hyperlipidemia      Chronic kidney disease (CKD)      BPH (benign prostatic hyperplasia)      Chronic atrial fibrillation        FAMILY HISTORY:    SOCIAL HISTORY:      - Tobacco     - ETOH    Allergies    No Known Allergies    Intolerances      Vital Signs Last 24 Hrs  T(C): 36.4 (12 Sep 2022 09:06), Max: 36.4 (11 Sep 2022 15:43)  T(F): 97.6 (12 Sep 2022 09:06), Max: 97.6 (12 Sep 2022 09:06)  HR: 76 (12 Sep 2022 09:06) (67 - 76)  BP: 114/55 (12 Sep 2022 09:06) (111/60 - 114/55)  BP(mean): --  RR: 18 (12 Sep 2022 09:06) (16 - 18)  SpO2: 97% (12 Sep 2022 09:06) (96% - 98%)    Parameters below as of 12 Sep 2022 09:06  Patient On (Oxygen Delivery Method): room air        09-11 @ 07:01  -  09-12 @ 07:00  --------------------------------------------------------  IN: 0 mL / OUT: 600 mL / NET: -600 mL          MEDICATIONS:  MEDICATIONS  (STANDING):  acetaminophen     Tablet .. 650 milliGRAM(s) Oral every 6 hours  atorvastatin 10 milliGRAM(s) Oral at bedtime  cephalexin 500 milliGRAM(s) Oral four times a day  fenofibrate Tablet 145 milliGRAM(s) Oral every 24 hours  finasteride 5 milliGRAM(s) Oral daily  pantoprazole  Injectable 40 milliGRAM(s) IV Push every 12 hours  QUEtiapine 25 milliGRAM(s) Oral at bedtime  sertraline 50 milliGRAM(s) Oral daily  tamsulosin 0.4 milliGRAM(s) Oral at bedtime    MEDICATIONS  (PRN):      PHYSICAL EXAM:  Un Comfortable, no distress  Eyes: PERRL, EOM intact; conjunctiva and sclera clear  Head: Normocephalic;  No Trauma  ENMT: No nasal discharge, hoarseness, +cough no hemoptysis  Neck: Supple; non tender; no masses or deformities.    No JVD  Respiratory:   - WHEEZING   + RHONCHI  - RALES  + CRACKLES.  Diminished breath sounds  BILATERAL  RIGHT  LEFT bases   Cardiovascular: Regular rate and rhythm. S1 and S2 Normal; No murmurs, gallops or rubs     - PPM/AICD  Gastrointestinal: Soft non-tender, non-distended; Normal bowel sounds; No hepatosplenomegaly.     -PEG    -  GT   - MOMIN  Genitourinary: No costovertebral angle tenderness. No dysuria  Extremities: AROM, No clubbing, cyanosis or edema    Vascular: Peripheral pulses palpable 2+ bilaterally  Neurological: Alert and responisve to stimuli   Skin: Warm and dry. No obvious rash  Lymph Nodes: No acute cervical or supraclavicular adenopathy  Psychiatric:m not really engaging  DEVICES:  - DENTURES   +IV R / L     - ETUBE   -TRACH   -CTUBE  R / L    LABS:                          9.5    13.90 )-----------( 258      ( 12 Sep 2022 08:03 )             29.6     09-12    144  |  111<H>  |  98<H>  ----------------------------<  87  4.1   |  21<L>  |  3.88<H>    Ca    9.2      12 Sep 2022 08:03  Phos  3.2     09-11  Mg     2.4     09-11    TPro  4.4<L>  /  Alb  2.1<L>  /  TBili  0.9  /  DBili  x   /  AST  37  /  ALT  25  /  AlkPhos  49  09-11      RADIOLOGY & ADDITIONAL STUDIES (The following images were personally reviewed):

## 2022-09-12 NOTE — DISCHARGE NOTE PROVIDER - NSDCFUSCHEDAPPT_GEN_ALL_CORE_FT
Aidan Manzanares  [facility]  [address]  Scheduled Appointment: 10/03/2022

## 2022-09-12 NOTE — SWALLOW BEDSIDE ASSESSMENT ADULT - SLP GENERAL OBSERVATIONS
Pt received awake in bed, being fed by PCA. PCA reported consistent coughing with liquids (head neutral). Pt oriented to self and place with verbal choices. However, confused and considered to be an unreliable historian. Unable to respond as to whether or not he has been using a chin tuck

## 2022-09-12 NOTE — SWALLOW BEDSIDE ASSESSMENT ADULT - SWALLOW EVAL: RECOMMENDED DIET
NPO pending final decision re: long-term goals re: feeding. If GOC are to continue PO despite the risk of prandial asp./PNA, please allow pt to continue her preferred diet. If family wants to pursue comfort PO but is amenable to diet modifications, can consider a MBS.

## 2022-09-12 NOTE — DIETITIAN INITIAL EVALUATION ADULT - OTHER INFO
89 y/o male PMH TIA, restrictive CM, CHF, HTN, HLD, Afib (on Eliquis), CKD (baseline Scr 2.0), BPH, urinary retention admitted to [facility] for melena and anemia requiring transfusion. Active: melena, anemia    Pt seen on 4UR at bedside. RD could not obtain information from pt, as pt was A&O x 0 per RN. Information could not be obtained from pt's emergency contacts (pt's daughters). Information obtained from RN, EMR data and RD team at [facility] (pt has been a long-term resident at that facility). Per RDs at [facility], UBW was ~180# as of 3 months ago in June 2022, however pt noted with recent wt loss of ~30# (wt was 149# on 09/06/22 per RD at [facility]), which is significant. Wt upon admission appears to be inaccurate (90.7kg, 200#). No appetite data noted per pt, nurse or EMR, however it is suspected appetite may be low r/t recent wt loss and AMS. Per RD team at [facility] facility, pt noted with variable intakes. Preferences: per RD team at [facility], pt enjoys yogurt as a snack and fortified hot cereal in the morning. They also noted pt was receiving Suplena 1x/day at [facility]. GI: s/s fecal incontinence noted; last BM noted on 09/11/22; no N/V noted by RN. Curly: 10. Skin integrity: redness blanchable per flowsheets data; unstageable pressure injury to sacrum, L buttocks unstageable pressure injury, L hip DTI, L heel DTI, R heel DTI. R hand and R arm edema 3+ noted. I/O: 1020/600 (420). Denies pain/discomfort. NKFA. Per SLP documentation, pt noted with coughing with liquids. Pertinent lab values: low H&H, elevated BUN, Creat, low eGFR; will monitor. Pt is receiving a Clear Liquid diet. SLP recommended NPO on this date. Pertinent nutrition-related medications/supplements: pt is receiving setraline, and IV fluids for hydration/electrolyte repletion. Dietitian unable to conduct full nutrition focused physical exam; will f/u with pt; upon visual inspection, no s/s of malnutrition observed. However d/t significant weight loss and inadequate PO intakes, per ASPEN guidelines, pt meets criteria for malnutrition. Additional nutrition recommendations listed below to follow.

## 2022-09-12 NOTE — DISCHARGE NOTE NURSING/CASE MANAGEMENT/SOCIAL WORK - NSDCPEFALRISK_GEN_ALL_CORE
For information on Fall & Injury Prevention, visit: [email or web address] OR  [email or web address] OR  https://www.cdc.gov/steadi/patient.html

## 2022-09-12 NOTE — DIETITIAN INITIAL EVALUATION ADULT - PERTINENT LABORATORY DATA
09-12    144  |  111<H>  |  98<H>  ----------------------------<  87  4.1   |  21<L>  |  3.88<H>    Ca    9.2      12 Sep 2022 08:03  Phos  3.2     09-11  Mg     2.4     09-11    TPro  4.4<L>  /  Alb  2.1<L>  /  TBili  0.9  /  DBili  x   /  AST  37  /  ALT  25  /  AlkPhos  49  09-11

## 2022-09-12 NOTE — PROGRESS NOTE ADULT - ASSESSMENT
ASSESSMENT/PLAN89 y/o male PMH TIA, restrictive CM, CHF, HTN, HLD, Afib (on Eliquis), CKD (baseline Scr 2.0), BPH, urinary retention, limited mobility, brought from [facility] for melena, symptomatic anemia c  sob, , recent aspiration pneumonitis,  UTI, SADIQ on CKD     1. O2 continue  off   2. Bronchodilators:  Atrovent/ albuterol q 4 – 6 hours as needed  3. Corticosteroids: off  4. ID/Antibiotics:   5. Cardiac/HTN: optimize BP   6. GI: Rx/ prophylaxis c PPI/H2B  7. Heme: Rx/VT prophylaxis c SQH/SCD/hold  Eliquis, use SCD if Doppler negative   8. Aspiration precautions,   9. Nephrology feedback     Discussed c managing team

## 2022-09-12 NOTE — DISCHARGE NOTE PROVIDER - DISCHARGE DIET
Regular Diet - No restrictions/Pureed Diet/Moderately Thick Liquids Pureed Diet/Moderately Thick Liquids/Other Diet Instructions

## 2022-09-12 NOTE — CONSULT NOTE ADULT - PROBLEM SELECTOR RECOMMENDATION 2
Patient present with GIB bleed, had received 3 units of blood. Current counts appears stable. Continue care as per primary team.  Per discussion with daughters, they would not want to transfuse him again and want to keep him comfortable if he can get to [facility]. Referral to be made.

## 2022-09-12 NOTE — SWALLOW BEDSIDE ASSESSMENT ADULT - SLP PERTINENT HISTORY OF CURRENT PROBLEM
PMH TIA, restrictive CM, CHF, HTN, HLD, Afib (on Eliquis), CKD (baseline Scr 2.0), BPH, urinary retention admitted to [facility] for melena and anemia requiring transfusion, now stable and transferred to [facility]. PMH TIA, restrictive CM, CHF, HTN, HLD, Afib (on Eliquis), CKD (baseline Scr 2.0), BPH, urinary retention admitted to [facility] for melena and anemia requiring transfusion, now stable and transferred to [facility]. CXR 9/9 revealed, "Silhouetting left hemidiaphragm which may reflect infiltrate."

## 2022-09-12 NOTE — DISCHARGE NOTE PROVIDER - NSDCCPCAREPLAN_GEN_ALL_CORE_FT
PRINCIPAL DISCHARGE DIAGNOSIS  Diagnosis: Upper GI bleed  Assessment and Plan of Treatment: You were admitted to the hospital because you were having black tarry stools, which is a symptom of an upper GI bleed. You were also found to have a low hemoglobin level of 5.9. You received a blood transfusion and your hemoglobin increased appropriately. However, after discussion with your family, it was decided to not pursue endoscopy. Your hemoglobin remained stable throughout your admission. Your eliquis was held as it would increase your risk for  bleeding. If you should begin to experience any blood in the stool or vomit, notice black tarry stools, fatigue, or lethargy, you should notify a staff member immediately and return to the hospital.       PRINCIPAL DISCHARGE DIAGNOSIS  Diagnosis: Upper GI bleed  Assessment and Plan of Treatment: You were admitted to the hospital because you were having black tarry stools, which is a symptom of an upper GI bleed. You were also found to have a low hemoglobin level of 5.9. You received a blood transfusion and your hemoglobin increased appropriately. However, after discussion with your family, it was decided to not pursue endoscopy. Your hemoglobin remained stable throughout your admission. Your eliquis was held as it would increase your risk for  bleeding; this was discussed with your daughter Celina. Because this increases the risk of stroke from atrial fibrillation, please follow up with your cardiologist or primary care provider to discuss if and when to resume the eliquis. If you should begin to experience any blood in the stool or vomit, notice black tarry stools, fatigue, or lethargy, you should notify a staff member immediately and return to the hospital.      SECONDARY DISCHARGE DIAGNOSES  Diagnosis: Encounter for palliative care  Assessment and Plan of Treatment: Palliative care team was consulted to assist with goals of care planning. You will be receiving hospice services at [facility]. Regarding your diet, you were evaluated by the speech and swallow team and seen to be at elevated risk of aspiration of food. You will be discharged on a pureed with thick liquids to minimize risk of aspiration, which was discussed with your daughter Celina. You can do a modified barium swallow test outside the hospital to help decide if your diet can be advanced.    Diagnosis: Cellulitis  Assessment and Plan of Treatment: You had redness and swelling of your right arm suspicious for cellulitis, or infection of the skin. Ultrasound was negative for a blood clot. Please continue to take KEFLEX, a type of antibiotic, 250mg twice a day through 9/16/22.

## 2022-09-12 NOTE — CHART NOTE - NSCHARTNOTEFT_GEN_A_CORE
Discussed case with primary team. Per GOC, no endoscopic evaluation to be pursued for patient's melena. Please call GI back with any questions, concerns, or if patient's GOC were to change.     Kirti Juares MD  PGY-6, Gastroenterology Fellow  pager: [phone]

## 2022-09-12 NOTE — PHYSICAL THERAPY INITIAL EVALUATION ADULT - PERTINENT HX OF CURRENT PROBLEM, REHAB EVAL
89M  presents to the ED after being sent in for large amount of black tarry stool. Pt with dementia and unable to contribute to history effectively. Pt complains of pain everywhere but unable to fully participate in ROS and subjective portion of assessment. Pt received 3u pRBCs in the ED without any further noted episodes of melena or hematochezia while in the ED.

## 2022-09-12 NOTE — PHYSICAL THERAPY INITIAL EVALUATION ADULT - ADDITIONAL COMMENTS
unable to obtain prior PLOF 2/2 patient confused, as per Psychosocial Assessment, patient from NH, patient wheelchair ambulatory x 1 person assist since June 2022, total assist for ADLs

## 2022-09-12 NOTE — CONSULT NOTE ADULT - CONVERSATION DETAILS
In addition to the EM visit, an advance care planning meeting was performed  Start time: 1050am  End time: 1106am  Total time: 16 minutes   A telephone meeting to discuss advance care planning was held today regarding: JOHN LIMON  Primary decision maker:   Patient is unable to make decisions for himself.   Alternate/surrogate: Emerita and Celina (daughters)  Discussed advance directives including, but not limited to, healthcare proxy and code status.  Decision regarding code status: DNR/DNI  Documentation completed today: DUSTY already in chart     Spoke to patients daughter today regarding their father, they confirmed that he should remain a DNR/DNI. Discussed possibility of Hospice at this Nursing home as well as [facility].  They seemed interested in referral to [facility] and understand that if he bleeds again they will ensure he is comfortable and not transfuse him and they are ok with it. Emotional support was provided.

## 2022-09-12 NOTE — SWALLOW BEDSIDE ASSESSMENT ADULT - NS SPL SWALLOW CLINIC TRIAL FT
Adequate bolus containment but with slow prolonged bolus manipulation. Laryngeal movement palpated. ~2 swallows per bolus suggestive of pharyngeal clearance deficits which is consistent with pt's complaints of sticking sensation (again, unclear if pt is a reliable historian). Immediate and delayed coughing following ~75% of liquid trials suggestive of aspiration. Of important note, pt unable to perform a chin tuck during trials d/t reduced head/neck ROM.

## 2022-09-12 NOTE — DISCHARGE NOTE PROVIDER - CARE PROVIDER_API CALL
Aidan Manzanares (MD)  Internal Medicine; Nephrology  [address], [place] [address]  Phone: [phone]  Fax: [phone]  Scheduled Appointment: 10/03/2022

## 2022-09-12 NOTE — SWALLOW BEDSIDE ASSESSMENT ADULT - SWALLOW EVAL: DIAGNOSIS
Clinical signs of pharyngeal dysphagia in pt with known hx of deficits. Despite MBS findings of SILENT aspiration, suspect pt is now sensate to airway protection deficits. Overall, there appears to have been an exacerbation in symptoms compared to last assessment. However, palliative care involved in pt's care. As per most recent palliative note and medical resident, family appears to be leaning more towards comfort measures. Consequently, instrumental swallow study unlikely to alter POC.

## 2022-09-12 NOTE — DISCHARGE NOTE PROVIDER - DETAILS OF MALNUTRITION DIAGNOSIS/DIAGNOSES
This patient has been assessed with a concern for Malnutrition and was treated during this hospitalization for the following Nutrition diagnosis/diagnoses:     -  09/12/2022: Severe protein-calorie malnutrition

## 2022-09-12 NOTE — PROGRESS NOTE ADULT - ATTENDING COMMENTS
seen by bedside, slightly agitated, denies abdominal pain and rest of ROS limited due to dementia. Unable to recall if any melanotic stools overnight, but denies feeling dizzy, light-headedness, chest pain. He is having pain in RUE and does report swelling is new    Physical Exam:  General: NAD  Resp: no increased WOB, CTAB  CVS: RRR, no m/r/g, no pitting edema  GI: +BS, nt/nd  Neuro: alert and oriented to person and place; +facial droop some dysarthria and 2/5 LUE strength, 0/5 RUE strength.  Skin: erythema and swelling of RUE, pulses intact, no open wounds/abrasions    A/P:  1. UGIB - conservative measures only, off AC and s/p 3 u PRBCs, H/H stable. Palliative consulted for GOC, recommendations appreciated, family interested in hospice; c/w PPI bid for now  2. chronic Afib on AC -- elevated [id number] score 5, 7.2% risk of stroke per year however  given dementia hx, limited mobility, hx of restrictive CM and CHF, stroke ppx in setting of Afib vs. bleeding risk must be weighed. HAS-BLED score 4 with high risk of bleeding. Functional status unlikely to improve with ongoing AC and poses high bleeding risk, therefore we will hold of on AC.  3. Dementia with agitation - c/w seroquel and melatonin  4. RUE cellulitis and edema - discontinue PIV, dopplers negative for DVT, ace-wrap, elevate, Ice and start keflex po  5. CHFrEF, stable  6. SADIQ, resolving  7. essential HTN --  BP meds held for now.  6. CKD, stable  7. stable MDD
Seen/discussed with fellow  Agree with above
89 yo M w/ dementia, CM, AF, HTN, CKD presented with urinary retention and anemia 2/2 GIB (+ melena), hemodynamically stable, no recurrent melena, responded appropriately to transfusions.     #Blood loss anemia 2/2 GIB - PPI changed to IV BID, will trend CBC, hold AC. GI following, family does not want invasive procedures such as endoscopy  #Hyperactive delerium - remains delirious although at baseline per family, will trial Seroquel QHS. Bladder scan showing no retention of urine. Tylenol PRN for pain  #AFIB - holding AC in setting of GIB  #BPH - CW finasteride, flomax  #HTN - holding antihypertensives in setting of GIB  #GOC - DNR/DNI, no invasive interventions, Palliative care consult on Monday
89 yo M w/ dementia, CM, AF, HTN, CKD presented with urinary retention and anemia 2/2 GIB (+ melena), hemodynamically stable, no recurrent melena, responded appropriately to transfusions. Remains delirious although at baseline per family. DNR/DNI, family does not want to proceed with scope at this time. Given stability of vitals, improved H/H, for patient comfort and appropriateness of care will transfer to [facility]. Palliative care consult on Monday. Trial low dose PRN haldol. Bladder scan to ensure no overflow incontinence and bladder distention contributing to agitation. PRN tylenol for pain.

## 2022-09-12 NOTE — CONSULT NOTE ADULT - PROBLEM SELECTOR PROBLEM 1
NOTIFICATION RETURN TO WORK / SCHOOL 
 
9/10/2019 8:49 AM 
 
Mr. Aline Altamirano [facility] [address] To Whom It May Concern: 
 
Rio Tavarez. is currently under the care of [facility]. He will return to work/school on: 9/12/2019 If there are questions or concerns please have the patient contact our office. Sincerely, Fatou Prince, DO 
 
                                
 
 Debility

## 2022-09-12 NOTE — DIETITIAN INITIAL EVALUATION ADULT - OTHER CALCULATIONS
Based on Standards of Care, pt within % IBW, however, pt's wt on admission may be skewed, thus ideal body weight used for all calculations. Needs adjusted for advanced age, pressure injuries and malnutrition. Protein recs adjusted r/t CKD status. Fluid recs per team.

## 2022-09-12 NOTE — DISCHARGE NOTE NURSING/CASE MANAGEMENT/SOCIAL WORK - PATIENT PORTAL LINK FT
You can access the FollowMyHealth Patient Portal offered by [facility] by registering at the following website: [email or web address]. By joining [facility]’s FollowMyHealth portal, you will also be able to view your health information using other applications (apps) compatible with our system.

## 2022-09-12 NOTE — DISCHARGE NOTE PROVIDER - ATTENDING DISCHARGE PHYSICAL EXAMINATION:
Physical Exam:  General: NAD  Eyes: +orbital pallor  Resp: no increased WOB, CTAB  CVS: RRR, no m/r/g, no pitting edema  GI: +BS, nt/nd  Neuro: alert and oriented to person place only

## 2022-09-12 NOTE — DIETITIAN INITIAL EVALUATION ADULT - PERTINENT MEDS FT
MEDICATIONS  (STANDING):  acetaminophen     Tablet .. 650 milliGRAM(s) Oral every 6 hours  atorvastatin 10 milliGRAM(s) Oral at bedtime  cephalexin 500 milliGRAM(s) Oral four times a day  fenofibrate Tablet 145 milliGRAM(s) Oral every 24 hours  finasteride 5 milliGRAM(s) Oral daily  pantoprazole  Injectable 40 milliGRAM(s) IV Push every 12 hours  QUEtiapine 25 milliGRAM(s) Oral at bedtime  sertraline 50 milliGRAM(s) Oral daily  tamsulosin 0.4 milliGRAM(s) Oral at bedtime    MEDICATIONS  (PRN):

## 2022-09-12 NOTE — SWALLOW BEDSIDE ASSESSMENT ADULT - COMMENTS
Pt known to our service during recent admission in August. MBS was completed on 8/18 which revealed moderate oropharyngeal dysphagia. Per my note, laryngeal penetration and SILENT aspiration of all consistencies and bolus stasis (solids>purees>liquids). A chin tuck was effective in improving swallow safety and efficiency. Recommendations at that time were for a modified diet of minced and moist solids and thin liquids using a chin tuck.

## 2022-09-12 NOTE — CONSULT NOTE ADULT - PROBLEM SELECTOR RECOMMENDATION 5
Hourly rounding performed. Assessed patient complaints and Bathroom/comfort needs.  Pt states that HA is getting more severe, asking about pain meds   16 minutes were spent discussing advance care planning over the phone with the patients daughters.

## 2022-09-12 NOTE — CONSULT NOTE ADULT - PROBLEM SELECTOR RECOMMENDATION 6
Patient remains a DNR/DNI. Please see GOC above for details.  As discussed during the palliative IDT meeting, the patients PSSA screening did not identify any current psychosocial need or spiritual support deficits.  - Orthodox/Spiritual practice: Unknown   - Coping: [ ] well [ ] with difficulty [ ] poor coping [x] unable to obtain  - Support system: [ x] strong [ ] adequate [ ] inadequate  - All questions answered, emotional support provided  - dw primary team   - Please contact Palliative Medicine 24/7 at [phone] for any acute symptoms or further questions  - Will continue to follow with you Patient remains a DNR/DNI. Please see GOC above for details. [facility] did not accept patient. Plan for UES with hospice services.   As discussed during the palliative IDT meeting, the patients PSSA screening did not identify any current psychosocial need or spiritual support deficits.  - Presybeterian/Spiritual practice: Unknown   - Coping: [ ] well [ ] with difficulty [ ] poor coping [x] unable to obtain  - Support system: [ x] strong [ ] adequate [ ] inadequate  - All questions answered, emotional support provided  -  primary team   - Please contact Palliative Medicine 24/7 at [phone] for any acute symptoms or further questions  - Will continue to follow with you

## 2022-09-12 NOTE — DISCHARGE NOTE PROVIDER - PREFACE STATEMENT FOR MINUTES SPENT
-- DO NOT REPLY / DO NOT REPLY ALL --  -- Message is from the [facility]--    General Patient Message      Reason for Call: Patient is advising she believes she has strep because her children do. She has swollen glands and it is painful and hard for her to swallow. She is requesting Amoxicillin be sent to the pharmacy.     Caller Information       Type Contact Phone    03/13/2022 12:55 PM CDT Phone (Incoming) [employer] (Self) [phone] (M)          Alternative phone number: [phone]    Turnaround time given to caller:   \"This message will be sent to [state Provider's name]. The clinical team will fulfill your request as soon as they review your message.\"    
[phone]    Onset: Today   Location / description: Swollen gland and hard to swallow  Precipitating Factors: Both of her kids have strep  Pain Scale (1-10), 10 highest: 4/10  Associated Symptoms: None  What improves / worsens symptoms:  Symptom specific medications:   LMP : No LMP recorded.  Are you pregnant or breast feeding:   Recent visits (last 3-4 weeks) for same reason or recent surgery:     PLAN:   See/Call Provider within 24 hours -Refused -Requesting antibiotics today    Patient/Caller refuses to follow recommendations.    Reason for Disposition  • [1] Sore throat AND [2] strep throat EXPOSURE (i.e., meets definition) within past 10 days    Protocols used: STREP THROAT EXPOSURE-A-      
VV ok for today  
I personally spent

## 2022-09-12 NOTE — DIETITIAN INITIAL EVALUATION ADULT - ADD RECOMMEND
1. Continue with current diet order *align with SLP recommendations and provide nutrition as able*  >>Ensure Clear BID (480kcal, 16gPRO)  *IF diet is advanced, recommend Suplena BID (840kcal, 22gPRO)*  2. Encourage pt to meet nutritional needs as able   3. Monitor PO intakes, trend weights (weekly), monitor skin integrity, monitor labs (electrolytes, CMP), monitor GI fxn   4. Encourage adherence to diet education (reinforce as able)   5. Recommend Nephrovite daily  >>Recommend Zinc 220mg/day and Vitamin C 500mg daily for wound healing   6. Pain and bowel regimen per team   7. Will continue to assess/honor preferences as able   8. Align nutrition interventions with GOC at all times

## 2022-09-12 NOTE — PHYSICAL THERAPY INITIAL EVALUATION ADULT - MANUAL MUSCLE TESTING RESULTS, REHAB EVAL
Is This A New Presentation, Or A Follow-Up?: Phototherapy Treatment
functional observation against gravity ~1/5 MMT/grossly assessed due to

## 2022-09-12 NOTE — DISCHARGE NOTE PROVIDER - HOSPITAL COURSE
#Discharge: do not delete    89 y/o male PMH TIA, restrictive CM, CHF, HTN, HLD, Afib (on Eliquis), CKD (baseline Scr 2.0), BPH, urinary retention admitted to med tele for melena and anemia requiring transfusion.     Problem List/Main Diagnoses (system-based):     #GIB (gastrointestinal bleeding).   Pt presenting from [facility] with melena, Hgb 7.6 on arrival to the ED with downtrend to 5.9 while in ED. S/p 3UpRBC's w/ Hgb responding to 9.4. Hematemesis not reported on admission, however, pt noted to have dark residue on tongue and periorally. Per patient's family, decision was made to not pursue endoscopy with GI. Was placed on PPI gtt. Pt s/p Eliquis reversal, and eliquis will be held on discharge after discussion with family, determined that risk of bleed greater than risk of stroke from afib.  - Continue with PPI 40 mg q12h  - Transfuse goal <7  - Acetaminophen 650 mg q6h standing    #Acute kidney injury superimposed on CKD.   Cr 3.63 on admission w/ most recent cr of 3.80. Pt noted to have ATN 2/2 SADIQ during previous admission initially pre-renal iso poor PO intake and poor perfusion but also found to have urinary retention requiring rosenbaum w/ progression to post renal SADIQ. Possible etiology of SADIQ during this admission is pre-renal in the setting of hypovolemia 2/2 to GI losses vs post-renal 2/2 continued obstruction.     #Agitation due to dementia.   Patient is agitated at baseline w/ history of dementia.   - seroquel 25mg at bedtime.    #Edema of right upper extremity.   Patient with new edema of RUE. Differentials include infiltration of IV vs DVT. Upper right extremity dopplers negative for DVT. Concern for cellulitis, started on 7 day course of keflex 250mg q12hrs.    #Chronic HFrEF (heart failure with reduced ejection fraction).   Hx of HFrEF, pr does not appear to be in active exacerbation. Home meds discontinued on previous admission 2/2 to SADIQ on CKD. TTE 8/2022: EF 45-50%  - caution with fluid administration.    #Chronic atrial fibrillation.   Home medication: Eliquis 2.5mg BID  - Hold i/s/o GIB. Discussed risks of stroke vs bleed with family, agreeable with holding eliquis given recent concern for bleed. Will reassess at rehab facility regarding restarting eliquis.    #HTN (hypertension).   Home medications Coreg 3.125mg BID, Enalapril 5mg qd, Lasix 20mg qd, Hydralazine 50mg TID were held following previous admission due to SADIQ. Normotensive on admission  - Hold BP meds i/s/o SADIQ and soft pressures. Restart as necessary.    #HLD (hyperlipidemia)   Home medication Lipitor 10mg qd  - Cont home meds    #BPH (benign prostatic hyperplasia).   Home meds: finasteride 5 mg qd and Flomax 0.4 mg qd  - Cont home meds.    #Major depression.   Home meds: Sertraline 50mg   - Continue home meds.    Inpatient treatment course: Patient admitted for concern of melena, Hgb 5.9 on admission. Transfused with 3units of pRBCs, Hgb responded to 9.4. Per patient's family, decision was made to not pursue endoscopy. Patient was treated with PPI gtt initially, transitioned to BID. H&H stable. For SADIQ on CKD, likely pre-renal in etiology, started on IVF with improvement in Cr. For agitation, patient started on seroquel 25mg qhs.     Patient was discharged to: [facility]    New medications: Keflex 250mg for 7 days, seroquel 25mg at bedtime    Changes to old medications: STOP eliquis    Medications that were stopped: STOP taking eliquis    Items to Follow up as outpatient: CBC, CMP    Physical exam at time of discharge: A&Ox0-1, heart RRR, lungs CTAB, abdomen soft, ND, tender throughout. No focal deficits.   #Discharge: do not delete    89 y/o male PMH TIA, restrictive CM, CHF, HTN, HLD, Afib (on Eliquis), CKD (baseline Scr 2.0), BPH, urinary retention admitted to med tele for melena and anemia requiring transfusion.     Problem List/Main Diagnoses (system-based):     #GIB (gastrointestinal bleeding).   Pt presenting from [facility] with melena, Hgb 7.6 on arrival to the ED with downtrend to 5.9 while in ED. S/p 3UpRBC's w/ Hgb responding to 9.4. Hematemesis not reported on admission, however, pt noted to have dark residue on tongue and periorally. Per patient's family, decision was made to not pursue endoscopy with GI. Was placed on PPI gtt. Pt s/p Eliquis reversal, and eliquis will be held on discharge after discussion with family, determined that risk of bleed greater than risk of stroke from afib.  - Continue with PPI 40 mg q12h  - Transfuse goal <7  - Acetaminophen 650 mg q6h standing    #Acute kidney injury superimposed on CKD.   Cr 3.63 on admission w/ most recent cr of 3.80. Pt noted to have ATN 2/2 SADIQ during previous admission initially pre-renal iso poor PO intake and poor perfusion but also found to have urinary retention requiring rosenbaum w/ progression to post renal SADIQ. Possible etiology of SADIQ during this admission is pre-renal in the setting of hypovolemia 2/2 to GI losses vs post-renal 2/2 continued obstruction.     #Agitation due to dementia.   Patient is agitated at baseline w/ history of dementia.   - seroquel 25mg at bedtime.    #Edema of right upper extremity.   Patient with new edema of RUE. Differentials include infiltration of IV vs DVT. Upper right extremity dopplers negative for DVT. Concern for cellulitis, started on 5 day course of keflex 250mg q12hrs, last day on 9/16.    #Chronic HFrEF (heart failure with reduced ejection fraction).   Hx of HFrEF, pr does not appear to be in active exacerbation. Home meds discontinued on previous admission 2/2 to SADIQ on CKD. TTE 8/2022: EF 45-50%  - caution with fluid administration.    #Chronic atrial fibrillation.   Home medication: Eliquis 2.5mg BID  - Hold i/s/o GIB. Discussed risks of stroke vs bleed with family, agreeable with holding eliquis given recent concern for bleed. Will reassess at rehab facility regarding restarting eliquis.    #HTN (hypertension).   Home medications Coreg 3.125mg BID, Enalapril 5mg qd, Lasix 20mg qd, Hydralazine 50mg TID were held following previous admission due to SADIQ. Normotensive on admission  - Hold BP meds i/s/o SADIQ and soft pressures. Restart as necessary.    #HLD (hyperlipidemia)   Home medication Lipitor 10mg qd  - Cont home meds    #BPH (benign prostatic hyperplasia).   Home meds: finasteride 5 mg qd and Flomax 0.4 mg qd  - Cont home meds.    #Major depression.   Home meds: Sertraline 50mg   - Cont home meds.    Inpatient treatment course: Patient admitted for concern of melena, Hgb 5.9 on admission. Transfused with 3units of pRBCs, Hgb responded to 9.4. Per patient's family, decision was made to not pursue endoscopy. Patient was treated with PPI gtt initially, transitioned to BID. H&H stable. For SADIQ on CKD, likely pre-renal in etiology, started on IVF with improvement in Cr. For agitation, patient started on seroquel 25mg qhs.     Patient was discharged to: [facility]    New medications: Keflex 250mg for 5 days, seroquel 25mg at bedtime    Changes to old medications: STOP eliquis    Medications that were stopped: STOP taking eliquis    Items to Follow up as outpatient: CBC, CMP    Physical exam at time of discharge: A&Ox0-1, heart RRR, lungs CTAB, abdomen soft, ND, tender throughout. No focal deficits.   #Discharge: do not delete    89 y/o male PMH TIA, restrictive CM, CHF, HTN, HLD, Afib (on Eliquis), CKD (baseline Scr 2.0), BPH, urinary retention admitted to med tele for melena and anemia requiring transfusion.     Problem List/Main Diagnoses (system-based):     #GIB (gastrointestinal bleeding).   Pt presenting from [facility] with melena, Hgb 7.6 on arrival to the ED with downtrend to 5.9 while in ED. S/p 3UpRBC's w/ Hgb responding to 9.4. Hematemesis not reported on admission, however, pt noted to have dark residue on tongue and periorally. Per patient's family, decision was made to not pursue endoscopy with GI. Was placed on PPI gtt. Pt s/p Eliquis reversal, and eliquis will be held on discharge after discussion with family, determined that risk of bleed greater than risk of stroke from afib.  - Continue with PPI 40 mg q12h  - Transfuse goal <7  - Acetaminophen 650 mg q6h standing    #Acute kidney injury superimposed on CKD.   Cr 3.63 on admission w/ most recent cr of 3.80. Pt noted to have ATN 2/2 SADIQ during previous admission initially pre-renal iso poor PO intake and poor perfusion but also found to have urinary retention requiring rosenbaum w/ progression to post renal SADIQ. Possible etiology of SADIQ during this admission is pre-renal in the setting of hypovolemia 2/2 to GI losses vs post-renal 2/2 continued obstruction.     #Agitation due to dementia.   Patient is agitated at baseline w/ history of dementia.   - seroquel 25mg at bedtime.    #Edema of right upper extremity.   Patient with new edema of RUE. Differentials include infiltration of IV vs DVT. Upper right extremity dopplers negative for DVT. Concern for cellulitis, started on 5 day course of keflex 250mg q12hrs, last day on 9/16.    #Chronic HFrEF (heart failure with reduced ejection fraction).   Hx of HFrEF, pr does not appear to be in active exacerbation. Home meds discontinued on previous admission 2/2 to SADIQ on CKD. TTE 8/2022: EF 45-50%  - caution with fluid administration.    #Chronic atrial fibrillation.   Home medication: Eliquis 2.5mg BID  - Hold i/s/o GIB. Discussed risks of stroke vs bleed with family, agreeable with holding eliquis given recent concern for bleed. Will reassess at rehab facility regarding restarting eliquis.    #HTN (hypertension).   Home medications Coreg 3.125mg BID, Enalapril 5mg qd, Lasix 20mg qd, Hydralazine 50mg TID were held following previous admission due to SADIQ. Normotensive on admission  - Hold BP meds i/s/o SADIQ and soft pressures. Restart as necessary.    #HLD (hyperlipidemia)   Home medication Lipitor 10mg qd  - Cont home meds    #BPH (benign prostatic hyperplasia).   Home meds: finasteride 5 mg qd and Flomax 0.4 mg qd  - Cont home meds.    #Major depression.   Home meds: Sertraline 50mg   - Cont home meds.    Inpatient treatment course: Patient admitted for concern of melena, Hgb 5.9 on admission. Transfused with 3units of pRBCs, Hgb responded to 9.4. Per patient's family, decision was made to not pursue endoscopy. Patient was treated with PPI gtt initially, transitioned to BID. H&H stable. For SADIQ on CKD, likely pre-renal in etiology, started on IVF with improvement in Cr. For agitation, patient started on seroquel 25mg qhs.     Patient was discharged to: [facility]    New medications: Keflex 250mg for 5 days, seroquel 25mg at bedtime, protonix 40mg BID for 6 weeks, then switch to once daily    Changes to old medications: STOP eliquis    Medications that were stopped: STOP taking eliquis    Items to Follow up as outpatient: CBC, CMP    Physical exam at time of discharge: A&Ox0-1, heart RRR, lungs CTAB, abdomen soft, ND, tender throughout. No focal deficits.

## 2022-09-12 NOTE — PHYSICAL THERAPY INITIAL EVALUATION ADULT - BALANCE DISTURBANCE, IDENTIFIED IMPAIRMENT CONTRIBUTE, REHAB EVAL
impaired motor control/abnormal muscle tone/impaired postural control/decreased ROM/impaired sensory feedback/decreased strength

## 2022-09-12 NOTE — DISCHARGE NOTE NURSING/CASE MANAGEMENT/SOCIAL WORK - NSPROEXTENSIONSOFSELF_GEN_A_NUR
ASSESSMENT:  Diabetes mellitus, type 1, diagnosed in 1997 and uncontrolled on the basis of HgbA1c and home glucose records.  Complications include mild non proliferative diabetic retinopathy OU.  He changed back to the Omnipod pump 4 weeks ago.  Based on current home glucose records, discussed increasing his basal rate so that he is at the same dose throughout the 24 hrs.  He makes the change in his pump during the visit.  He reports that the documentation required per insurance provider to continue covering the Omnipod pump is in progress.      PLAN:  1.  Change Humalog insulin per Omnipod insulin pump as follows:  Basal Insulin Settings:  0:00    0.9  9:00    0.9  13:00  0.9  Sensitivity Factor:  55  Carbohydrate Ratio:  15  Blood Sugar Goals:  150  Active Insulin Time:  4:00   2.  Download pump or contact this office with glucoses in about 2 weeks.  3.  He will be contacted with results and further recommendations as indicated.  4.  Follow up visit in 3 months with a HgbA1c on return.      CHIEF COMPLAINT:  Chief Complaint   Patient presents with   • Diabetes     Follow up DMT1 on Insulin Pump       HISTORY OF PRESENT ILLNESS:  This 52 year old White male returns today for scheduled followup for known type 1 diabetes.  He is currently on Humalog insulin per Omnipod insulin pump as follows:  Basal Insulin Settings:  0:00    0.9  9:00    0.7  13:00  0.9  Sensitivity Factor:  55  Carbohydrate Ratio:  15  Blood Sugar Goals:  150  Active Insulin Time:  4:00   He tried the Minimed 670G pump and did not like the tubing, as it \"caught on everything\" and did not like the frequent alarms.  He switched back to the Omnipod.  His insurance provider required a peer to peer phone contact (done 11/8/18) regarding the pump change and further requires proof that he has returned the Minimed pump to the [employer] before covering his Omnipod again.  He denies any severe hypoglycemia.  He works 3rd shift, 9 PM - 6 AM and is  looking for a position within the same company that is day shift.  He sleeps from about 9 AM - 4 PM.    He changes his pod every 3 days.  DIABETIC HEALTH MAINTENANCE    Date of last Ophthalmology exam 7/12/18  Date of last dental exam 9/2018  Is patient on an ACE/ARB?  Yes  Is patient on aspirin therapy?  No  Is patient on Statin/Fibrate therapy?  Yes  Has patient had foot care education?  Yes  Has patient had Nutrition/Diet/Diabetes Mellitus education within the last 1 year(s)? Yes, training for Minimed insulin pump in 4/2018.  Is patient up-to-date with immunizations including Influenza and Pneumococcal vaccinations?  Yes  Does the patient have a Medical ID?  Yes.  He has been informed of its benefit.    Patient Active Problem List   Diagnosis   • Hyperlipidemia   • HTN (hypertension)   • Tobacco use disorder   • Unspecified hypothyroidism   • Myopia with presbyopia   • Diabetes mellitus type 1 (CMS/HCC)   • Intervertebral cervical disc disorder with myelopathy, cervical region   • Cervical radiculopathy at C6   • Myopia with astigmatism and presbyopia   • Atopic contact dermatitis   • Atopic conjunctivitis, acute   • Eyelid dermatitis, allergic/contact   • Strain of right knee and leg   • Filamentary keratitis   • Knee strain   • Chronic allergic conjunctivitis   • MMT (medial meniscus tear)       HISTORIES:    KNEE SCOPE,DIAGNOSTIC                           11/10/2008      Comment: Knee Arthroscopy-right    WRIST ARTHROSCOP,DIAGNOSTIC                                     Comment: 2012-right    TONSILLECTOMY AND ADENOIDECTOMY                               CERVICAL SPINE SURGERY                                          Comment: discectomy/disc replacement    REINSERT BI/TRICEPS TENDON,DISTAL               01/27/2017      Comment: [facility]    EMPLOYER:  [employer]    ALLERGIES:  Allergies as of 11/19/2018 - Reviewed 11/19/2018   Allergen Reaction Noted   • Bactrim [sulfamethoxazole w/trimethoprim] RASH 04/04/2016   •  Celebrex [celecoxib] Other (See Comments)        MEDICATIONS PREVIOUS TO THIS VISIT:  Current Outpatient Medications   Medication Sig Dispense Refill   • Insulin Infusion Pump Supplies Misc      • blood glucose test strip Test blood sugar 12 times daily as directed. Diagnosis: E10.9. Meter: Freestyle Lite 1100 each 3   • lisinopril (ZESTRIL) 2.5 MG tablet Take 1 tablet by mouth daily. 90 tablet 3   • insulin glargine (LANTUS) 100 UNIT/ML injectable solution Give 20 units subcutaneously once daily in the event of insulin pump failure. 10 mL 12   • insulin lispro (HUMALOG) 100 UNIT/ML injectable solution USE WITH INSULIN PUMP. MAXIMUM DOSE IS 70 UNITS PER DAY 90 mL 1   • levothyroxine (SYNTHROID, LEVOTHROID) 125 MCG tablet Take 1 tablet by mouth daily. 30 tablet 11   • atorvastatin (LIPITOR) 10 MG tablet Take 1 tablet by mouth daily. 30 tablet 11   • insulin subQ pump Inject  into the skin continuous.     • Insulin Disposable Pump (OMNIPOD) Misc Omnipod Insulin Pump.     • clobetasol (TEMOVATE) 0.05 % cream Apply twice daily to affected areas 30 g 1     No current facility-administered medications for this visit.        MEDICATIONS AFTER THIS VISIT:  Current Outpatient Medications   Medication Sig Dispense Refill   • Insulin Infusion Pump Supplies Misc      • blood glucose test strip Test blood sugar 12 times daily as directed. Diagnosis: E10.9. Meter: Freestyle Lite 1100 each 3   • lisinopril (ZESTRIL) 2.5 MG tablet Take 1 tablet by mouth daily. 90 tablet 3   • insulin glargine (LANTUS) 100 UNIT/ML injectable solution Give 20 units subcutaneously once daily in the event of insulin pump failure. 10 mL 12   • insulin lispro (HUMALOG) 100 UNIT/ML injectable solution USE WITH INSULIN PUMP. MAXIMUM DOSE IS 70 UNITS PER DAY 90 mL 1   • levothyroxine (SYNTHROID, LEVOTHROID) 125 MCG tablet Take 1 tablet by mouth daily. 30 tablet 11   • atorvastatin (LIPITOR) 10 MG tablet Take 1 tablet by mouth daily. 30 tablet 11   • insulin  subQ pump Inject  into the skin continuous.     • Insulin Disposable Pump (OMNIPOD) Misc Omnipod Insulin Pump.     • clobetasol (TEMOVATE) 0.05 % cream Apply twice daily to affected areas 30 g 1     No current facility-administered medications for this visit.        REVIEW OF SYSTEMS:  Reviewed in nursing notes, and I agree.    OBJECTIVE:  General:  This is a(n) well developed, well nourished White male in no distress, who is alert, oriented to person with normal eye contact, normal affect, normal thought processes and clear speech.  He ambulates normally without assistance.  Non-labored respirations.  Vital Signs:    Visit Vitals  /80   Pulse 72   Ht 6' (1.829 m)   Wt 86 kg   BMI 25.70 kg/m²     Finger stick glucose is 246, 2 hours after eating.  Abdomen:  Injection sites are benign with no evidence of lipohypertrophy.  Resolving ecchymoses consistent with injection history.  Pod is on the left upper arm and the site is clean.  Extremities:  Skin texture is normal.  Nails are normal.  Palms are warm and dry.  There is no upper extremity fine tremor.  Deep tendon reflexes are 2+ with a normal appearing return phase in the upper extremities and at the ankles.  The skin of the bare feet is intact.  Pedal pulses 2+ bilaterally.  Normal distribution of hair over the dorsum of the feet and toes.  Toenails are normal.  Capillary refill is normal.  Monofilament testing is normal on the dorsal and plantar surfaces of the feet.  There is no peripheral edema.  Diabetic Foot Exam Documentation   Normal Bilateral Foot Exam: Skin integrity is normal. Dorsalis pedis and posterior tibial pulses are present.  Pressure sensation using the Spelter-Jose monofilament is present.      LABORATORY DATA:    TSH (mcUnits/mL)   Date Value   12/09/2017 10.700 (H)     Lab Results   Component Value Date    SODIUM 137 12/09/2017    POTASSIUM 4.1 12/09/2017    CHLORIDE 99 12/09/2017    CO2 28 12/09/2017    BUN 14 12/09/2017     CREATININE 0.71 12/09/2017    GLUCOSE 192 (H) 12/09/2017    URMIC 1.61 07/19/2016    .00 07/19/2016    MALBCR 9.8 07/19/2016     Hemoglobin A1C (no units)   Date Value   11/19/2018 8.3   05/22/2018 8.0     Hemoglobin A1C POC (%)   Date Value   01/30/2017 7.5 (A)   07/18/2016 8.3 (A)       Home glucose readings:  Downloaded from the patient's home glucose meter for the past 2 weeks and reviewed.  Total readings 39, range , average 217.  Patterns: best readings fasting with average of 126, the only low glucose was 7-8 AM, glucoses stable but high the remainder of the day, with averages of 235-250.       none

## 2022-09-12 NOTE — CHART NOTE - NSCHARTNOTEFT_GEN_A_CORE
PALLIATIVE MEDICINE COORDINATION OF CARE NOTE FOR JOHN LIMON  [  ] ED Trigger   [  ] MICU Trigger     [  ] Consult    [  X] AI Comanagement    Patient last assessed: __8/17/2022___  to manage: GOC/AD, Symptoms, and Support was recommended: __Support____    ___30___ Minutes; Start: _0700am____  End: _0730am___, of non-face-to-face prolonged service provided that relates to (face-to-face) care that has or will occur and ongoing patient management, including one or more of the following:   - Reviewed records from other physicians or other health care professional services, including one or more of the following: other medical records and diagnostic / radiology study results     HPI:  89 y/o M with a PMHx of Dementia, Anemia, bladder CA, CVA, heart failure, Afib on Eliquis, chronic kidney disease, BPH presents to the ED after being sent in for large amount of black tarry stool. Pt with dementia and unable to contribute to history effectively. Pt complains of pain everywhere but unable to fully participate in ROS and subjective portion of assessment. Pt received 3u pRBCs in the ED without any further noted episodes of melena or hematochezia while in the ED.    In the ED:  Initial vital signs: T: 98.3F, HR: 73, BP: 113/55, R: 18, SpO2: 97% on RA  Labs: significant for WBC 14.73 w/ neutrophil predominance 91.4%, Hgb 7.2>>5.9, Hct 19, .1, Plt 355, PT 17.1, INR 1.43, BUN 98, Cr 3.63, GFR 15  Imaging:   CXR: no acute cardiopulmonary findings, cardiomegaly  EKG: Afib | , QTc 476  Medications: protonix gtt, 3U pRBCs, KCentra  Consults: GI (10 Sep 2022 00:58)      - Other: iStop reviewed.    Rx found on iStop review. Ref #:  [id number]    - Other: Medication reviewed.    The patient HAS NOT used PRN's in the last 24h.    MEDICATIONS  (STANDING):  acetaminophen     Tablet .. 650 milliGRAM(s) Oral every 6 hours  atorvastatin 10 milliGRAM(s) Oral at bedtime  fenofibrate Tablet 145 milliGRAM(s) Oral every 24 hours  finasteride 5 milliGRAM(s) Oral daily  pantoprazole  Injectable 40 milliGRAM(s) IV Push every 12 hours  QUEtiapine 25 milliGRAM(s) Oral at bedtime  sertraline 50 milliGRAM(s) Oral daily  tamsulosin 0.4 milliGRAM(s) Oral at bedtime    MEDICATIONS  (PRN):      - Other: Advanced directives     DNR DNI     MOLST form found on patient window scanned documents MOLST 8/26/2022     No documented HCP form found on patient window     No Living will / POA / Advance directives found on [place] / patient window     GOC notes on [facility] 8/17/2022    - Other: Coordination/Plan of care     __2__ admissions in 1 year     Current admission LOS: _2__ days     LACE score: _12___ ADVANCE ILLNESS PATIENT.     Patient previously seen by palliative medicine consult service.      Consult request for: Advanced Illness Comanagement     Full consult to follow within 24h.

## 2022-09-12 NOTE — CONSULT NOTE ADULT - PROBLEM SELECTOR RECOMMENDATION 3
Patient with chronic heart failure. Last TTE on 8/18 showed EF 50%. Continue care as per primary team.

## 2022-09-12 NOTE — PROGRESS NOTE ADULT - PROVIDER SPECIALTY LIST ADULT
Internal Medicine
Internal Medicine
Pulmonology
Pulmonology
Gastroenterology
Pulmonology
Internal Medicine
Internal Medicine

## 2022-09-12 NOTE — DISCHARGE NOTE PROVIDER - NSDCMRMEDTOKEN_GEN_ALL_CORE_FT
ascorbic acid 500 mg oral tablet: 1 tab(s) orally once a day  cephalexin 250 mg oral capsule: 1 cap(s) orally every 12 hours, last day on 9/21  docusate potassium 100 mg oral capsule: 1 cap(s) orally 2 times a day  doxepin 3 mg oral tablet: 1 tab(s) orally once a day (at bedtime)  fenofibrate 145 mg oral tablet: 1 tab(s) orally once a day  finasteride 5 mg oral tablet: 1 tab(s) orally once a day  Flomax 0.4 mg oral capsule: 1 cap(s) orally once a day  lidocaine 4% patch: Apply topically to lower back once a day  Lipitor 10 mg oral tablet: 1 tab(s) orally once a day  pantoprazole 40 mg intravenous injection: 40 milligram(s) intravenous every 12 hours  QUEtiapine 25 mg oral tablet: 1 tab(s) orally once a day (at bedtime)  sertraline 50 mg oral tablet: 1 tab(s) orally once a day  Tylenol 325 mg oral tablet: 2 tab(s) orally every 6 hours, As Needed for pain   ascorbic acid 500 mg oral tablet: 1 tab(s) orally once a day  cephalexin 250 mg oral capsule: 1 cap(s) orally every 12 hours, last day on 9/16  docusate potassium 100 mg oral capsule: 1 cap(s) orally 2 times a day  doxepin 3 mg oral tablet: 1 tab(s) orally once a day (at bedtime)  fenofibrate 145 mg oral tablet: 1 tab(s) orally once a day  finasteride 5 mg oral tablet: 1 tab(s) orally once a day  Flomax 0.4 mg oral capsule: 1 cap(s) orally once a day  lidocaine 4% patch: Apply topically to lower back once a day  Lipitor 10 mg oral tablet: 1 tab(s) orally once a day  pantoprazole 40 mg oral delayed release tablet: 1 tab(s) orally 2 times a day   QUEtiapine 25 mg oral tablet: 1 tab(s) orally once a day (at bedtime)  sertraline 50 mg oral tablet: 1 tab(s) orally once a day  Tylenol 325 mg oral tablet: 2 tab(s) orally every 6 hours, As Needed for pain   ascorbic acid 500 mg oral tablet: 1 tab(s) orally once a day  cephalexin 250 mg oral capsule: 1 cap(s) orally every 12 hours, last day on 9/16  docusate potassium 100 mg oral capsule: 1 cap(s) orally 2 times a day  doxepin 3 mg oral tablet: 1 tab(s) orally once a day (at bedtime)  fenofibrate 145 mg oral tablet: 1 tab(s) orally once a day  finasteride 5 mg oral tablet: 1 tab(s) orally once a day  Flomax 0.4 mg oral capsule: 1 cap(s) orally once a day  lidocaine 4% patch: Apply topically to lower back once a day  Lipitor 10 mg oral tablet: 1 tab(s) orally once a day  pantoprazole 40 mg oral delayed release tablet: 1 tab(s) orally 2 times a day for 6 weeks, then switch to once daily  QUEtiapine 25 mg oral tablet: 1 tab(s) orally once a day (at bedtime)  sertraline 50 mg oral tablet: 1 tab(s) orally once a day  Tylenol 325 mg oral tablet: 2 tab(s) orally every 6 hours, As Needed for pain   ascorbic acid 500 mg oral tablet: 1 tab(s) orally once a day  cephalexin 250 mg oral capsule: 1 cap(s) orally every 12 hours, last day on 9/16  docusate potassium 100 mg oral capsule: 1 cap(s) orally 2 times a day  doxepin 3 mg oral tablet: 1 tab(s) orally once a day (at bedtime)  fenofibrate 145 mg oral tablet: 1 tab(s) orally once a day  finasteride 5 mg oral tablet: 1 tab(s) orally once a day  Flomax 0.4 mg oral capsule: 1 cap(s) orally once a day  lidocaine 4% patch: Apply topically to lower back once a day  Lipitor 10 mg oral tablet: 1 tab(s) orally once a day  pantoprazole 40 mg oral delayed release tablet: 1 tab(s) orally 2 times a day for 6 weeks, then switch to once daily  QUEtiapine 25 mg oral tablet: 1 tab(s) orally once a day (at bedtime)  sertraline 50 mg oral tablet: 1 tab(s) orally once a day  Tylenol 325 mg oral tablet: 2 tab(s) orally every 6 hours, As Needed for pain  Zinc: 1 tab(s) orally once a day (220mg)

## 2022-09-12 NOTE — DIETITIAN INITIAL EVALUATION ADULT - NUTRITIONGOAL OUTCOME1
Pt to consistently meet at least 75% of EEE via tolerated route that is consistent with GOC; pt will no longer exhibit s/s of malnutrition

## 2022-09-12 NOTE — CONSULT NOTE ADULT - SUBJECTIVE AND OBJECTIVE BOX
JOHN LIMON          MRN-[id number]            ([id number])    HPI:  89 y/o M with a PMHx of Dementia, Anemia, bladder CA, CVA, heart failure, Afib on Eliquis, chronic kidney disease, BPH presents to the ED after being sent in for large amount of black tarry stool. Pt with dementia and unable to contribute to history effectively. Pt complains of pain everywhere but unable to fully participate in ROS and subjective portion of assessment. Pt received 3u pRBCs in the ED without any further noted episodes of melena or hematochezia while in the ED.    In the ED:  Initial vital signs: T: 98.3F, HR: 73, BP: 113/55, R: 18, SpO2: 97% on RA  Labs: significant for WBC 14.73 w/ neutrophil predominance 91.4%, Hgb 7.2>>5.9, Hct 19, .1, Plt 355, PT 17.1, INR 1.43, BUN 98, Cr 3.63, GFR 15  Imaging:   CXR: no acute cardiopulmonary findings, cardiomegaly  EKG: Afib | , QTc 476  Medications: protonix gtt, 3U pRBCs, KCentra  Consults: GI (10 Sep 2022 00:58)    PAST MEDICAL & SURGICAL HISTORY:  Chronic CHF    Hypertension    Hyperlipidemia    Chronic kidney disease (CKD)    BPH (benign prostatic hyperplasia)    Chronic atrial fibrillation    FAMILY HISTORY:   Reviewed and found non contributory in mother or father    SOCIAL HISTORY: Unable to obtain due to Cognitive impairment     PSYCHOSOCIAL ASSESSMENT:  Moravian/Spiritual practice: Unknown   Role of organized Church [ ] important [ ] some [x ] unable to assess dt pt mentation   Coping: [ ] well [ ] with difficulty [ ] poor coping [x ] unable to assess dt pt mentation  Support system: [ ] strong [x ] adequate [ ] inadequate    ROS:    Unable to attain due to:  Cognitive impairment     Dyspnea (Jose Enrique 0-10):     0                   N/V (Y/N):      N                        Secretions (Y/N) :  N              Agitation(Y/N): N  Pain (Y/N):     N  -Provocation/Palliation: n/a   -Quality/Quantity: n/a  -Radiating: n/a  -Severity: n/a  -Timing/Frequency: n/a   -Impact on ADLs: n/a     General:  unable to obtain  HEENT:     unable to obtain  Neck:   unable to obtain  CVS:  unable to obtain  Resp:   unable to obtain  GI:   unable to obtain  :  unable to obtain  Musc:  unable to obtain  Neuro:   unable to obtain  Psych:   unable to obtain  Skin:   unable to obtain  Lymph: unable to obtain    Allergies    No Known Allergies    Intolerances    Opiate Naive (Y/N):  Y  -iStop reviewed (Y/N): Y (Ref#:   [id number]         )    Medications:      MEDICATIONS  (STANDING):  acetaminophen     Tablet .. 650 milliGRAM(s) Oral every 6 hours  atorvastatin 10 milliGRAM(s) Oral at bedtime  fenofibrate Tablet 145 milliGRAM(s) Oral every 24 hours  finasteride 5 milliGRAM(s) Oral daily  pantoprazole  Injectable 40 milliGRAM(s) IV Push every 12 hours  QUEtiapine 25 milliGRAM(s) Oral at bedtime  sertraline 50 milliGRAM(s) Oral daily  tamsulosin 0.4 milliGRAM(s) Oral at bedtime    MEDICATIONS  (PRN):    Labs:    CBC:                        9.5    13.90 )-----------( 258      ( 12 Sep 2022 08:03 )             29.6     CMP:        144  |  111<H>  |  98<H>  ----------------------------<  87  4.1   |  21<L>  |  3.88<H>    Ca    9.2      12 Sep 2022 08:03  Phos  3.2     -  Mg     2.4     -    TPro  4.4<L>  /  Alb  2.1<L>  /  TBili  0.9  /  DBili  x   /  AST  37  /  ALT  25  /  AlkPhos  49  09-11    Imaging:    < from: Xray Chest 1 View- PORTABLE-Urgent (Xray Chest 1 View- PORTABLE-Urgent .) (22 @ 22:34) >    ACC: [id number] EXAM:  XR CHEST PORTABLE URGENT 1V                          PROCEDURE DATE:  2022    IMPRESSION: Silhouetting left hemidiaphragm which may reflect infiltrate   and/or effusion    --- End of Report ---    < end of copied text >    < from: US Duplex Venous Upper Ext Ltd, Right (22 @ 18:26) >    ACC: [id number] EXAM:  US DPLX UPR EXT VEINS LTD RT                          PROCEDURE DATE:  2022  IMPRESSION:  No evidence of right upper extremity deep venous thrombosis.    < end of copied text >    PEx:  T(C): 36.4 (22 @ 09:06), Max: 36.4 (22 @ 15:43)  HR: 76 (22 @ 09:06) (67 - 76)  BP: 114/55 (22 @ 09:06) (111/60 - 114/55)  RR: 18 (22 @ 09:06) (16 - 18)  SpO2: 97% (22 @ 09:06) (96% - 98%)  Wt(kg): 90.7kG  Daily       CAPILLARY BLOOD GLUCOSE    I&O's Summary    11 Sep 2022 07:01  -  12 Sep 2022 07:00  --------------------------------------------------------  IN: 0 mL / OUT: 600 mL / NET: -600 mL    GENERAL:  [x ]Alert  [x ]Oriented x 1  [ ]Lethargic  [ ]Cachexia  [ ]Unarousable  [ x]Verbal  [ ]Non-Verbal  Behavioral:   [ ] Anxiety  [ ] Delirium [ ] Agitation [x ] Other - calm   HEENT:  [ ]Normal   [x ]Dry mouth   [ ]ET Tube/Trach  [ ]Oral lesions  PULMONARY:   [ x]Clear  [ ]Tachypnea  [ ]Audible excessive secretions   [ ]Rhonchi        [ ]Right [ ]Left [ ]Bilateral  [ ]Crackles        [ ]Right [ ]Left [ ]Bilateral  [ ]Wheezing     [ ]Right [ ]Left [ ]Bilateral  CARDIOVASCULAR:    [x ]Regular [ ]Irregular [ ]Tachy  [ ]Ariel [ ]Murmur [ ]Other  GASTROINTESTINAL:  [ x]Soft  [ ]Distended   [ ]+BS  [ ]Non tender [x ]Tender  [ ]PEG [ ]OGT/ NGT  Last BM:   GENITOURINARY:  [ ]Normal [ x] Incontinent   [ ]Oliguria/Anuria   [ ]Epstein  MUSCULOSKELETAL:   [ ]Normal   [ ]Weakness  [x ]Bed/Wheelchair bound [ ]Edema  NEUROLOGIC:   [ ]No focal deficits  [x ] Cognitive impairment  [ ] Dysphagia [ ]Dysarthria [ ] Paresis [ ]Other   SKIN:   [ ]Normal   [x ]Pressure injuries - please see RN documentation   [ ]Rash    Preadmit Karnofsky: 40 %           Current Karnofsky:   40  %  Cachexia (Y/N): N  BMI: 27.9kg/m2    Advanced Directives:     DNR/DNI     MOLST    DECISION MAKER: Patient is unable to make any decisions for himself  LEGAL SURROGATE: [place] [phone] and [facility] [phone]    GOALS OF CARE DISCUSSION       Palliative care info/counseling provided	           Family meeting       Advanced Directives addressed please see Advance Care Planning Note	           See previous Palliative Medicine Note       Documentation of GOC: 	DNR/DNI          REFERRALS	        Unit SW/Case Mgmt              Speech/Swallow       Patient/Family Support       Hospice       Nutrition       Dietician       PT/OT

## 2022-09-29 NOTE — ED ADULT NURSE NOTE - CAS TRG GENERAL NORM CIRC DET
Denies known Latex allergy or symptoms of Latex sensitivity.  Medications verified, no changes.  Allergies verified, no changes.   Tobacco use verified, no changes.   PCP verified, no changes.   Hemoglobin A1C (%)   Date Value   12/30/2019 7.0 (H)     Fu visit.  Diabetic pt here for a toenail trim.  Vitals deferred by MD    Past Medical History:   Diagnosis Date   • Adenomyosis    • Anal fissure 08/14/2000   • Asthma    • Atrial fibrillation (CMS/[facility])     family history-mother has not patient, on prophylactically metoprolol   • Blepharitis 08/03/2000   • Bronchitis 09/28/2000   • Cellulitis 07/24/2011   • Chest pain 11/28/2018   • Chronic pain     abdominal   • Disruption of external surgical wound 01/31/2011   • DM (diabetes mellitus) (CMS/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   • IUD (intrauterine device) in place 9/14   • Lump or mass in breast 08/25/2009   • Menorrhagia 07/28/2014   • Nail hypertrophy 01/30/2017   • Obesity, unspecified    • Pneumonia 10/16/2000   • Reflux esophagitis 11/13/2000   • Sinusitis, chronic    • Trochanteric bursitis 05/08/2012   • Umbilical hernia 03/28/2018    recurrent   • Umbilical hernia 10/22/2010   • Urinary frequency 02/23/2018   • UTI (urinary tract infection) 03/28/2012   • Vaginal discharge 02/14/2011   • Venous insufficiency of both lower extremities 01/18/2016   • Ventral hernia without obstruction or gangrene 05/29/2019   • Wears reading eyeglasses          
Nurses note reviewed and agreed with.  Patient RTC today with a CC of painful, inflammed toenails which the patient states hurt inside the shoes. The pain is related as being a pressure type pain occurring when the tops of the shoes rub on the toenails. The pain is related as being mild to moderate, and is made worse with dressier shoes.  She has diabetic shoes and likes them.  No new foot issues to report today.    Physical exam reveals nails that are thickened, lytic,brittle, yellow-brown discolored with marked subungual debris. There is noted pain to palpation of the nails. The nails are flaky and also exhibit an odor consistent with onchychomycosis. Nails affected are left 1, right 1.  Pedal pulses are present.  Sensation is WNL.  Callus formation on each forefoot with no ulceration or infection.  ROM is WNL on each foot.  Ambulation is with a heel to toe gait.  She is alert and orientated x 3.    Diagnosis is DM and dermatophytosis of the nails with report of pain.    All the affected nails were debrided and thinned in a dorsal-plantar direction using an electric . The diseased nail plate was removed to the bed using the . There were no cuts.   Diabetic foot care discussed.  We discussed shoes and padding for her callus.  Patient is instructed to RTC in 2 months.  
Strong peripheral pulses

## 2022-09-29 NOTE — H&P ADULT - PROBLEM SELECTOR PLAN 2
Multiple pressure ulcers per nursing assessment. Patient admits to pain everywhere.   - Dilaudid 0.5 IVP q4h   - Patient would benefit for [facility] for wound care and pain control

## 2022-09-29 NOTE — H&P ADULT - NSVTERISKREFERASSESS_GEN_ALL_CORE
"-- DO NOT REPLY / DO NOT REPLY ALL --  -- Message is from the ROIÂ²--    General Patient Message      Reason for Call: patient mom is calling to let you know that the patient is in the emergency room. He is at [id number] [employer] [address]    12/01/2021 10:02 AM CST Phone (Incoming) Demetrius Lau (Mother) [phone]          Alternative phone number: none     Turnaround time given to caller: ""This message will be sent to [facility] Provider's name]. The clinical team will fulfill your request as soon as they review your message. \""    " Refer to the Assessment tab to view/cancel completed assessment.

## 2022-09-29 NOTE — ED ADULT NURSE NOTE - OBJECTIVE STATEMENT
patient presents to ED from NH for AMS, confused and oriented x2(name and place). unable to answering appropriately.

## 2022-09-29 NOTE — GOALS OF CARE CONVERSATION - ADVANCED CARE PLANNING - CONVERSATION DETAILS
I spoke to patient's daughter (POA), Celina Bass updating her about Mr. Mendes's current hospital presentation. We discussed goals of care and Ms. Bass stated that her and her sister are his POA and have been in contact with his rehab center to get hospice services. Ms. Bass confirmed code status of DNR/DNI. Family's primary goal is pursing hospice care and maximizing comfort. Ideally would want transfer to hospice for wound care and pain control. NO antibiotics, feeding tube or noninvasive ventilation. Family okay with IV fluids if indicated for comfort. Palliative consult in the morning.

## 2022-09-29 NOTE — H&P ADULT - NSHPLABSRESULTS_GEN_ALL_CORE
LABS:                        9.4    9.29  )-----------( 305      ( 29 Sep 2022 18:29 )             30.2     09-29    153<H>  |  122<H>  |  117<H>  ----------------------------<  97  4.4   |  16<L>  |  5.99<H>    Ca    7.6<L>      29 Sep 2022 21:42    TPro  4.2<L>  /  Alb  1.6<L>  /  TBili  1.2  /  DBili  x   /  AST  57<H>  /  ALT  38  /  AlkPhos  67  09-29    PT/INR - ( 29 Sep 2022 18:29 )   PT: 14.2 sec;   INR: 1.19          PTT - ( 29 Sep 2022 18:29 )  PTT:24.3 sec      CT Head No Cont (09.29.22 @ 20:51)     IMPRESSION: No definite acute intracranial pathology on this motion   degraded examination.      Xray Chest 1 View-PORTABLE IMMEDIATE (09.29.22 @ 18:58)     IMPRESSION: Moderate right-sided pleural effusion.

## 2022-09-29 NOTE — ED ADULT NURSE REASSESSMENT NOTE - NS ED NURSE REASSESS COMMENT FT1
multiple pressure ulcers noted as follows:  - L buttocks: unstageable  - L near rectal area: stage 3  - sacrum: unstageable  - BL heel: unstagable   - BL medial first metatarsal: DTI  - R hip: skin tear    Dressing clean, dry and intact. Patient turned and repositioned q 2. Bony prominences elevated with cushion.
Received report from FARZANEH Padgett. Received patient in stretcher. AOX2. Vital signs as noted in flowsheet.  All needs attended. Pt on cardiac monitor. Fall risk precautions maintained. Purposeful proactive hourly rounding in progress. Incontinent care done. BM X 1. Moisture barrier cream applied. Turned and repositioned q 2 and PRN.     Epstein catheter inserted using sterile technique, draining by gravity, secured with StatLock. Second RN Hanges present to confirm sterility. Initial output of 250cc dark yellow urine noted.

## 2022-09-29 NOTE — H&P ADULT - ASSESSMENT
89 y/o M with a PMHx of Dementia, anemia, bladder CA, CVA, heart failure, Afib on Eliquis, chronic kidney disease, BPH, recent admission for GI bleed, presents to ED from nursing home for AMS and poor PO intake. Patient at baseline mental status at this time, however in worsening kidney failure. Family wishes to purse with comfort care at this time - admitted for possible inpatient hospice.

## 2022-09-29 NOTE — H&P ADULT - NSHPPHYSICALEXAM_GEN_ALL_CORE
VITALS:   T(C): 37.2 (09-29-22 @ 22:30), Max: 37.2 (09-29-22 @ 22:30)  HR: 72 (09-30-22 @ 01:27) (72 - 123)  BP: 65/47 (09-30-22 @ 01:27) (65/47 - 110/58)  RR: 16 (09-30-22 @ 01:27) (16 - 18)  SpO2: 96% (09-30-22 @ 01:27) (85% - 100%)    PHYSICAL EXAM:  GENERAL: Elderly, chronically ill appearing, in pain   HEAD:  Atraumatic, Normocephalic  EYES: EOMI, PERRLA, conjunctiva and sclera clear  ENMT: No tonsillar erythema, exudates, or enlargement; Dry MM   NECK: Supple, No JVD  HEART: Irregularly irregular   RESPIRATORY: Unlabored respirations. Decreased breath sounds at bases   ABDOMEN: Soft, Nontender, Nondistended; Bowel sounds present  EXTREMITIES:  2+ Peripheral Pulses, No clubbing, cyanosis, or edema  SKIN: Per nursing notes - multiple pressure ulcers including unstagable sacral   NEUROLOGY: A&Ox1, unable to further assess due to mental status

## 2022-09-29 NOTE — H&P ADULT - ATTENDING COMMENTS
#Comfort measures: advanced dementia p/w worsening mental status/kidney function, deemed hospice candidate on previous admission. Per family discussion, comfort measures only, plan for home vs inpt hospice. Paliaitve consult

## 2022-09-29 NOTE — H&P ADULT - HISTORY OF PRESENT ILLNESS
89 y/o M with a PMHx of Dementia, anemia, bladder CA, CVA, heart failure, Afib on Eliquis, chronic kidney disease, BPH, recent admission for GI bleed, presents to ED from nursing home for AMS and poor PO intake. Patient says "yes" when asked if he's in pain and says "everywhere" when asked where. Unable to obtain further history due to baseline dementia.     ED Course:  T: 97.1 (rectal), , BP 93/50, RR 18, 96% on room air   Labs: Hgb 9.4, Na 151, CO2 16, AG 18, , Cr 6.40, AST/ALT 65/46, Lactate 2.3  Imaging: Xray Chest Moderate right-sided pleural effusion. CT Head: No definite acute intracranial pathology on this motion degraded examination.

## 2022-09-29 NOTE — ED PROVIDER NOTE - OBJECTIVE STATEMENT
89 M w ams- ho ckd, afib, oa, tia, cva, pvd, cognitive impairment  sent in for poor po intake x 2-3 days  no other reported abnml from nh

## 2022-09-29 NOTE — ED PROVIDER NOTE - PROGRESS NOTE DETAILS
Comments: multiple pressure ulcers noted as follows:  - L buttocks: unstageable  - L near rectal area: stage 3  - sacrum: unstageable  - BL heel: unstagable   - BL medial first metatarsal: DTI  - R hip: skin tear

## 2022-09-29 NOTE — H&P ADULT - PROBLEM SELECTOR PLAN 1
SADIQ on CKD. Presented for AMS and poor po intake. Cr 6.40 on admission (3.88 on 9/12). Could be prerenal 2/2 poor po intake last few days. 1L given in ED   - No further labs or workup at this time. Family wishes to purse comfort measures and possible transfer to inpatient hospice.  - Palliative consult in AM

## 2022-09-29 NOTE — H&P ADULT - PROBLEM SELECTOR PLAN 3
Per NH, pt was brought in for worsening mental status. CT head unremarkable. On exam pt was AOx1 which was his mental status upon discharge on 9/12 (hx of dementia)

## 2022-09-29 NOTE — ED PROVIDER NOTE - CLINICAL SUMMARY MEDICAL DECISION MAKING FREE TEXT BOX
ams w marked dehydration- imp sepsis- stat ivf/ iv abx,, cxr, ua, CT head ams w marked dehydration- imp sepsis- stat ivf/ iv abx,, cxr, ua, CT head  extensive d/w daughter- px was dnr/dni- but will now proceed w comfort care + ivf only at this point- pain meds, oxygen by mask- comfort care only- as per d/w daughter poa Celina Bass- additional care/rx would be futile

## 2022-09-30 NOTE — PROGRESS NOTE ADULT - PROBLEM SELECTOR PLAN 2
Multiple pressure ulcers per nursing assessment. Patient admits to pain everywhere.   - Dilaudid 0.5 IVP q4h   -f/u wound care consult reccs  - Patient would benefit for [facility] for wound care and pain control Multiple pressure ulcers per nursing assessment. Patient admits to pain everywhere.   - Dilaudid 0.5 IVP q4h   - f/u wound care consult reccs  - Patient would benefit from [facility] for wound care and pain control

## 2022-09-30 NOTE — CONSULT NOTE ADULT - PROBLEM SELECTOR RECOMMENDATION 3
Patient with dementia, with a fast of >7C and an Albumin of 1.6. Would benefit from hospice services and [facility] given non healing wounds.

## 2022-09-30 NOTE — PROGRESS NOTE ADULT - SUBJECTIVE AND OBJECTIVE BOX
O/N Events: no acute events overnight.    Subjective/ROS: Patient seen and examined at bedside. Unable to obtain ROS due to patient's lack of response.     VITALS  Vital Signs Last 24 Hrs  T(C): 37.2 (29 Sep 2022 22:30), Max: 37.2 (29 Sep 2022 22:30)  T(F): 98.9 (29 Sep 2022 22:30), Max: 98.9 (29 Sep 2022 22:30)  HR: 72 (30 Sep 2022 01:27) (72 - 123)  BP: 65/47 (30 Sep 2022 01:27) (65/47 - 110/58)  BP(mean): --  RR: 16 (30 Sep 2022 01:27) (16 - 18)  SpO2: 96% (30 Sep 2022 01:27) (85% - 100%)    Parameters below as of 30 Sep 2022 01:27  Patient On (Oxygen Delivery Method): room air        CAPILLARY BLOOD GLUCOSE      POCT Blood Glucose.: 95 mg/dL (29 Sep 2022 18:00)      PHYSICAL EXAM- INCOMPLETE  General: NAD  Head: NC/AT; MMM; PERRL; EOMI;  Neck: Supple; no JVD  Respiratory: CTAB; no wheezes/rales/rhonchi  Cardiovascular: Regular rhythm/rate; S1/S2+, no murmurs, rubs gallops   Gastrointestinal: Soft; NTND; bowel sounds normal and present  Extremities: WWP; no edema/cyanosis  Neurological: A&Ox3, CNII-XII grossly intact; no obvious focal deficits    MEDICATIONS  (STANDING):  ascorbic acid 500 milliGRAM(s) Oral daily  finasteride 5 milliGRAM(s) Oral daily  lidocaine   4% Patch 1 Patch Transdermal daily  pantoprazole    Tablet 40 milliGRAM(s) Oral before breakfast  polyethylene glycol 3350 17 Gram(s) Oral daily  QUEtiapine 25 milliGRAM(s) Oral at bedtime  senna 2 Tablet(s) Oral at bedtime  sertraline 50 milliGRAM(s) Oral daily  tamsulosin 0.4 milliGRAM(s) Oral at bedtime    MEDICATIONS  (PRN):  HYDROmorphone  Injectable 0.5 milliGRAM(s) IV Push every 4 hours PRN Moderate pain (4-6), Severe pain (7-10), Respiratory rate greater than 22      No Known Allergies      LABS                        9.4    9.29  )-----------( 305      ( 29 Sep 2022 18:29 )             30.2         153<H>  |  122<H>  |  117<H>  ----------------------------<  97  4.4   |  16<L>  |  5.99<H>    Ca    7.6<L>      29 Sep 2022 21:42    TPro  4.2<L>  /  Alb  1.6<L>  /  TBili  1.2  /  DBili  x   /  AST  57<H>  /  ALT  38  /  AlkPhos  67      PT/INR - ( 29 Sep 2022 18:29 )   PT: 14.2 sec;   INR: 1.19          PTT - ( 29 Sep 2022 18:29 )  PTT:24.3 sec  Urinalysis Basic - ( 29 Sep 2022 20:28 )    Color: Yellow / Appearance: Clear / S.025 / pH: x  Gluc: x / Ketone: NEGATIVE  / Bili: Negative / Urobili: 0.2 E.U./dL   Blood: x / Protein: 30 mg/dL / Nitrite: NEGATIVE   Leuk Esterase: NEGATIVE / RBC: < 5 /HPF / WBC < 5 /HPF   Sq Epi: x / Non Sq Epi: 0-5 /HPF / Bacteria: Present /HPF      Culture - Blood (collected 22 @ 19:29)  Source: .Blood Blood-Peripheral  Preliminary Report (22 @ 08:00):    No growth at 12 hours    Culture - Blood (collected 22 @ 19:29)  Source: .Blood Blood-Peripheral  Preliminary Report (22 @ 08:00):    No growth at 12 hours        IMAGING/EKG/ETC   O/N Events: no acute events overnight.    Subjective/ROS: Patient seen and examined at bedside. Unable to obtain ROS due to patient's lack of response. Pt noticed to be sleeping comfortably.     VITALS  Vital Signs Last 24 Hrs  T(C): 37.2 (29 Sep 2022 22:30), Max: 37.2 (29 Sep 2022 22:30)  T(F): 98.9 (29 Sep 2022 22:30), Max: 98.9 (29 Sep 2022 22:30)  HR: 72 (30 Sep 2022 01:) (72 - 123)  BP: 65/47 (30 Sep 2022 01:) (65/47 - 110/58)  BP(mean): --  RR: 16 (30 Sep 2022 01:27) (16 - 18)  SpO2: 96% (30 Sep 2022 01:27) (85% - 100%)    Parameters below as of 30 Sep 2022 01:27  Patient On (Oxygen Delivery Method): room air        CAPILLARY BLOOD GLUCOSE      POCT Blood Glucose.: 95 mg/dL (29 Sep 2022 18:00)      PHYSICAL EXAM:  GENERAL: Elderly, chronically ill appearing, in pain   HEAD:  Atraumatic, Normocephalic  EYES: EOMI, PERRLA, conjunctiva and sclera clear  ENMT: No tonsillar erythema, exudates, or enlargement; Dry MM   NECK: Supple, No JVD  HEART: Irregularly irregular   RESPIRATORY: Unlabored respirations. Decreased breath sounds at bases   ABDOMEN: Soft, Nontender, Nondistended; Bowel sounds present  EXTREMITIES:  2+ Peripheral Pulses, No clubbing, cyanosis. +2 pitting edema of the extremities b/l.   SKIN: b/l pedal pressure ulcers, sacral ulcers.   NEUROLOGY: A&Ox1, unable to further assess due to mental status  MEDICATIONS  (STANDING):  ascorbic acid 500 milliGRAM(s) Oral daily  finasteride 5 milliGRAM(s) Oral daily  lidocaine   4% Patch 1 Patch Transdermal daily  pantoprazole    Tablet 40 milliGRAM(s) Oral before breakfast  polyethylene glycol 3350 17 Gram(s) Oral daily  QUEtiapine 25 milliGRAM(s) Oral at bedtime  senna 2 Tablet(s) Oral at bedtime  sertraline 50 milliGRAM(s) Oral daily  tamsulosin 0.4 milliGRAM(s) Oral at bedtime    MEDICATIONS  (PRN):  HYDROmorphone  Injectable 0.5 milliGRAM(s) IV Push every 4 hours PRN Moderate pain (4-6), Severe pain (7-10), Respiratory rate greater than 22      No Known Allergies      LABS                        9.4    9.29  )-----------( 305      ( 29 Sep 2022 18:29 )             30.2         153<H>  |  122<H>  |  117<H>  ----------------------------<  97  4.4   |  16<L>  |  5.99<H>    Ca    7.6<L>      29 Sep 2022 21:42    TPro  4.2<L>  /  Alb  1.6<L>  /  TBili  1.2  /  DBili  x   /  AST  57<H>  /  ALT  38  /  AlkPhos  67      PT/INR - ( 29 Sep 2022 18:29 )   PT: 14.2 sec;   INR: 1.19          PTT - ( 29 Sep 2022 18:29 )  PTT:24.3 sec  Urinalysis Basic - ( 29 Sep 2022 20:28 )    Color: Yellow / Appearance: Clear / S.025 / pH: x  Gluc: x / Ketone: NEGATIVE  / Bili: Negative / Urobili: 0.2 E.U./dL   Blood: x / Protein: 30 mg/dL / Nitrite: NEGATIVE   Leuk Esterase: NEGATIVE / RBC: < 5 /HPF / WBC < 5 /HPF   Sq Epi: x / Non Sq Epi: 0-5 /HPF / Bacteria: Present /HPF      Culture - Blood (collected 22 @ 19:29)  Source: .Blood Blood-Peripheral  Preliminary Report (22 @ 08:00):    No growth at 12 hours    Culture - Blood (collected 22 @ 19:29)  Source: .Blood Blood-Peripheral  Preliminary Report (22 @ 08:00):    No growth at 12 hours        IMAGING/EKG/ETC   O/N Events: no acute events overnight.    Subjective/ROS: Patient seen and examined at bedside. Unable to obtain ROS due to patient's lack of response. Pt noticed to be sleeping comfortably.     VITALS  Vital Signs Last 24 Hrs  T(C): 37.2 (29 Sep 2022 22:30), Max: 37.2 (29 Sep 2022 22:30)  T(F): 98.9 (29 Sep 2022 22:30), Max: 98.9 (29 Sep 2022 22:30)  HR: 72 (30 Sep 2022 01:) (72 - 123)  BP: 65/47 (30 Sep 2022 01:) (65/47 - 110/58)  BP(mean): --  RR: 16 (30 Sep 2022 01:27) (16 - 18)  SpO2: 96% (30 Sep 2022 01:27) (85% - 100%)    Parameters below as of 30 Sep 2022 01:27  Patient On (Oxygen Delivery Method): room air        CAPILLARY BLOOD GLUCOSE      POCT Blood Glucose.: 95 mg/dL (29 Sep 2022 18:00)      PHYSICAL EXAM:  GENERAL: Elderly, chronically ill appearing, in pain   HEAD:  Atraumatic, Normocephalic  EYES: EOMI, PERRLA, conjunctiva and sclera clear  ENMT: No tonsillar erythema, exudates, or enlargement; Dry MM   NECK: Supple, No JVD  HEART: Irregularly irregular   RESPIRATORY: Unlabored respirations. Decreased breath sounds at bases   ABDOMEN: Soft, Nontender, Nondistended; Bowel sounds present  EXTREMITIES:  2+ Peripheral Pulses, No clubbing, cyanosis. +2 pitting edema of the extremities b/l.   SKIN: b/l pedal pressure ulcers, sacral ulcers.   NEUROLOGY: A&Ox1, unable to further assess due to mental status    MEDICATIONS  (STANDING):  ascorbic acid 500 milliGRAM(s) Oral daily  finasteride 5 milliGRAM(s) Oral daily  lidocaine   4% Patch 1 Patch Transdermal daily  pantoprazole    Tablet 40 milliGRAM(s) Oral before breakfast  polyethylene glycol 3350 17 Gram(s) Oral daily  QUEtiapine 25 milliGRAM(s) Oral at bedtime  senna 2 Tablet(s) Oral at bedtime  sertraline 50 milliGRAM(s) Oral daily  tamsulosin 0.4 milliGRAM(s) Oral at bedtime    MEDICATIONS  (PRN):  HYDROmorphone  Injectable 0.5 milliGRAM(s) IV Push every 4 hours PRN Moderate pain (4-6), Severe pain (7-10), Respiratory rate greater than 22      No Known Allergies      LABS                        9.4    9.29  )-----------( 305      ( 29 Sep 2022 18:29 )             30.2         153<H>  |  122<H>  |  117<H>  ----------------------------<  97  4.4   |  16<L>  |  5.99<H>    Ca    7.6<L>      29 Sep 2022 21:42    TPro  4.2<L>  /  Alb  1.6<L>  /  TBili  1.2  /  DBili  x   /  AST  57<H>  /  ALT  38  /  AlkPhos  67      PT/INR - ( 29 Sep 2022 18:29 )   PT: 14.2 sec;   INR: 1.19          PTT - ( 29 Sep 2022 18:29 )  PTT:24.3 sec  Urinalysis Basic - ( 29 Sep 2022 20:28 )    Color: Yellow / Appearance: Clear / S.025 / pH: x  Gluc: x / Ketone: NEGATIVE  / Bili: Negative / Urobili: 0.2 E.U./dL   Blood: x / Protein: 30 mg/dL / Nitrite: NEGATIVE   Leuk Esterase: NEGATIVE / RBC: < 5 /HPF / WBC < 5 /HPF   Sq Epi: x / Non Sq Epi: 0-5 /HPF / Bacteria: Present /HPF      Culture - Blood (collected 22 @ 19:29)  Source: .Blood Blood-Peripheral  Preliminary Report (22 @ 08:00):    No growth at 12 hours    Culture - Blood (collected 22 @ 19:29)  Source: .Blood Blood-Peripheral  Preliminary Report (22 @ 08:00):    No growth at 12 hours        IMAGING/EKG/ETC

## 2022-09-30 NOTE — CONSULT NOTE ADULT - ASSESSMENT
89 year old  year old man with pain, functional quadriplegia,  Dementia, advance care planning and encounter for palliative care.

## 2022-09-30 NOTE — PATIENT PROFILE ADULT - FALL HARM RISK - HARM RISK INTERVENTIONS
Assistance with ambulation/Assistance OOB with selected safe patient handling equipment/Communicate Risk of Fall with Harm to all staff/Discuss with provider need for PT consult/Monitor for mental status changes/Monitor gait and stability/Reinforce activity limits and safety measures with patient and family/Reorient to person, place and time as needed/Tailored Fall Risk Interventions/Use of alarms - bed, chair and/or voice tab/Visual Cue: Yellow wristband and red socks/Bed in lowest position, wheels locked, appropriate side rails in place/Call bell, personal items and telephone in reach/Instruct patient to call for assistance before getting out of bed or chair/Non-slip footwear when patient is out of bed/[place] to call system/Physically safe environment - no spills, clutter or unnecessary equipment/Purposeful Proactive Rounding/Room/bathroom lighting operational, light cord in reach

## 2022-09-30 NOTE — PATIENT PROFILE ADULT - FUNCTIONAL ASSESSMENT - BASIC MOBILITY 6.
1-calculated by average/Not able to assess (calculate score using [facility] averaging method)

## 2022-09-30 NOTE — PROGRESS NOTE ADULT - PROBLEM SELECTOR PLAN 3
Per NH, pt was brought in for worsening mental status. CT head unremarkable. On exam pt was AOx1 which was his mental status upon discharge on 9/12 (hx of dementia) Per NH, pt was brought in for worsening mental status. CT head unremarkable. On exam pt was AOx1 which was his mental status upon discharge on 9/12 (hx of dementia)  - progressive decline in mental status, compared to previous admissions

## 2022-09-30 NOTE — CONSULT NOTE ADULT - PROBLEM SELECTOR RECOMMENDATION 9
Patient in pain, based on no verbal pain indicators,. most likely secondary to multiple Pressure ulcers. Would recommend Dilaudid 0.5mg q6h ATC and Dilaudid 0.5mg q3h IV PRN.

## 2022-09-30 NOTE — ADVANCED PRACTICE NURSE CONSULT - ASSESSMENT
89 y/o M with a PMHx of Dementia, anemia, bladder CA, CVA, heart failure, Afib on Eliquis, chronic kidney disease, BPH, recent admission for GI bleed, presents to ED from nursing home for AMS and poor PO intake. Pt presents with the following pressure injuries:  1. Left lateral foot unstageable 2 x 1.2 cm with soft brown eschar and slough in wound bed  2. DTI on dorsum of right foot beneath 5th toe 1 x 1 cm  3. Unstageable pressure injury left heel (scab) 1 x 1 cm  4. DTI lateral right foot 2 x 1 cm  5. Sacral unstageable 8 x 6 cm covered 100% with soft black eschar  6. right heel unstageable 2 x 5.5  7. Right medial hallux 1 x 1.5 cm DTI  8. Right trochanter evolving DTI 10 x 15 cm  9. Right elbow with pinpoint hole draining serous drainage when palpated  10. Right lower back DTI  11. Right lateral foot DTI 1.5 x 2 cm  Scattered areas of denudement over bilat buttocks, scattered excoriations over back

## 2022-09-30 NOTE — CONSULT NOTE ADULT - CONVERSATION DETAILS
In addition to the EM visit, an advance care planning meeting was performed  Start time: 1055am  End time: 1111am  Total time: 16 minutes   A telephone meeting to discuss advance care planning was held today regarding: JOHN LIMON  Primary decision maker:  Patient unable to make decisions for himself  Alternate/surrogate: Celina and Emerita  Discussed advance directives including, but not limited to, healthcare proxy and code status.  Decision regarding code status: DNR/DNI  Documentation completed today: DUSTY in chart    Discussion had with patient daughter over the phone, they reiterated that she does not want to see her dad suffer and wants to keep him comfortable. Explained that he will be getting IV opioids for his comfort and she was in agreement. Discussed [facility] referral and she is agreement with that. Emotional support was provided.

## 2022-09-30 NOTE — PATIENT PROFILE ADULT - PUBLIC BENEFITS
[employer] Complex Repair Preamble Text (Leave Blank If You Do Not Want): Extensive wide undermining was performed. no

## 2022-09-30 NOTE — PROGRESS NOTE ADULT - PROBLEM SELECTOR PLAN 1
SADIQ on CKD. Presented for AMS and poor po intake. Cr 6.40 on admission (3.88 on 9/12). Could be prerenal 2/2 poor po intake last few days. 1L given in ED   - No further labs or workup at this time. Family wishes to purse comfort measures and possible transfer to inpatient hospice.  - Palliative consult in AM SADIQ on CKD. Presented for AMS and poor po intake. Cr 6.40 on admission (3.88 on 9/12). Could be prerenal 2/2 poor po intake last few days. 1L given in ED   - No further labs or workup at this time. Family wishes to purse comfort measures and possible transfer to inpatient hospice.  - f/u palliative consult

## 2022-09-30 NOTE — PROGRESS NOTE ADULT - SUBJECTIVE AND OBJECTIVE BOX
Interventional, Pulmonary, Critical, Chest Special Procedures. For      Pt was seen and fully examined by myself.  The pt well known to me     Time spent with patient in minutes: 67    Patient is a 89y old  Male who presents with a chief complaint of AMS (30 Sep 2022 10:53) Events leading to this admission reviewed. The patient emaciated, one word response, eupneic on RA    HPI:  89 y/o M with a PMHx of Dementia, anemia, bladder CA, CVA, heart failure, Afib on Eliquis, chronic kidney disease, BPH, recent admission for GI bleed, presents to ED from nursing home for AMS and poor PO intake. Patient says "yes" when asked if he's in pain and says "everywhere" when asked where. Unable to obtain further history due to baseline dementia.     ED Course:  T: 97.1 (rectal), , BP 93/50, RR 18, 96% on room air   Labs: Hgb 9.4, Na 151, CO2 16, AG 18, , Cr 6.40, AST/ALT 65/46, Lactate 2.3  Imaging: Xray Chest Moderate right-sided pleural effusion. CT Head: No definite acute intracranial pathology on this motion degraded examination. (29 Sep 2022 22:41)      REConstitutional: No fever, weight loss, chills + fatigue  Eyes: No eye pain, visual disturbances, or discharge  ENMT:  + difficulty hearing, tinnitus, vertigo; No sinus or throat pain. No epistaxis, +dysphagia, dysphonia, hoarseness no odynophagia  Neck: No pain, stiffness or neck swelling.  No masses or deformities  Respiratory: +cough, no wheezing, hemoptysis  - COPD  - ILD   - PE   - ASTHMA     - PNEUMONIA  Cardiovascular: No chest pain, AF dysrhythmia, palpitations, dizziness or edema - CAD   + CHF   + HTN  Gastrointestinal: No abdominal or epigastric pain. No nausea, vomiting or hematemesis; No diarrhea or constipation. No melena or hematochezia, Icterus.          Genitourinary: No dysuria, frequency, hematuria or incontinence   +CKD/SADIQ      - ESRD  Neurological: No headaches, memory loss, loss of strength, numbness or tremors      +DEMENTIA     - STROKE    - SEIZURE  Skin: No itching, burning, rashes or lesions   Lymph Nodes: No enlarged glands  Endocrine: No heat or cold intolerance; No hair loss       - DM     - THYROID DISORDER  Musculoskeletal: No joint pain or swelling; No muscle, back or extremity pain, No edema  Psychiatric: No depression, anxiety, mood swings or difficulty sleeping  Heme/Lymph: No easy bruising or bleeding gums         - ANEMIA      + bladder CANCER   -COAGULOPATHY  Allergy and Immunologic: No hives or eczemaVIEW OF SYSTEMS:    PAST MEDICAL & SURGICAL HISTORY:  Chronic CHF      Hypertension      Hyperlipidemia      Chronic kidney disease (CKD)      BPH (benign prostatic hyperplasia)      Chronic atrial fibrillation        FAMILY HISTORY:    SOCIAL HISTORY:      - Tobacco     - ETOH    Allergies    No Known Allergies    Intolerances      Vital Signs Last 24 Hrs  T(C): 37.2 (29 Sep 2022 22:30), Max: 37.2 (29 Sep 2022 22:30)  T(F): 98.9 (29 Sep 2022 22:30), Max: 98.9 (29 Sep 2022 22:30)  HR: 72 (30 Sep 2022 01:27) (72 - 123)  BP: 65/47 (30 Sep 2022 01:27) (65/47 - 110/58)  BP(mean): --  RR: 16 (30 Sep 2022 01:27) (16 - 18)  SpO2: 96% (30 Sep 2022 01:27) (85% - 100%)    Parameters below as of 30 Sep 2022 01:27  Patient On (Oxygen Delivery Method): room air            MEDICATIONS:  MEDICATIONS  (STANDING):  HYDROmorphone  Injectable 0.5 milliGRAM(s) IV Push every 6 hours    MEDICATIONS  (PRN):  artificial  tears Solution 2 Drop(s) Both EYES every 1 hour PRN Dry Eyes  glycopyrrolate Injectable 0.4 milliGRAM(s) IV Push four times a day PRN Secretions  HYDROmorphone  Injectable 0.5 milliGRAM(s) IV Push every 3 hours PRN Moderate pain (4-6), Severe pain (7-10), Respiratory rate greater than 22  LORazepam   Injectable 0.5 milliGRAM(s) IV Push every 4 hours PRN Anxiety  ondansetron Injectable 4 milliGRAM(s) IV Push every 6 hours PRN Nausea and/or Vomiting      PHYSICAL EXAM:  Un Comfortable, no immediate distress  Eyes: PERRL, EOM intact; conjunctiva and sclera clear  Head: Normocephalic;  No Trauma  ENMT: No nasal discharge, hoarseness, +cough no hemoptysis  Neck: Supple; non tender; no masses or deformities.    No JVD  Respiratory:   - WHEEZING   + RHONCHI  - RALES  + CRACKLES.  Diminished breath sounds  BILATERAL  RIGHT  LEFT bases   Cardiovascular: Regular rate and rhythm. S1 and S2 Normal; No murmurs, gallops or rubs     - PPM/AICD  Gastrointestinal: Soft non-tender, non-distended; Normal bowel sounds; No hepatosplenomegaly.     -PEG    -  GT   - MOMIN  Genitourinary: No costovertebral angle tenderness. No dysuria  Extremities: AROM, No clubbing, cyanosis or edema    Vascular: Peripheral pulses palpable 2+ bilaterally  Neurological: Alert and responisve to stimuli   Skin: Warm and dry. No obvious rash  Lymph Nodes: No acute cervical or supraclavicular adenopathy  Psychiatric: not engaging     DEVICES:  - DENTURES   +IV R / L     - ETUBE   -TRACH   -CTUBE  R / L    LABS:                          9.4    9.29  )-----------( 305      ( 29 Sep 2022 18:29 )             30.2     09-29    153<H>  |  122<H>  |  117<H>  ----------------------------<  97  4.4   |  16<L>  |  5.99<H>    Ca    7.6<L>      29 Sep 2022 21:42    TPro  4.2<L>  /  Alb  1.6<L>  /  TBili  1.2  /  DBili  x   /  AST  57<H>  /  ALT  38  /  AlkPhos  67  09-29    PT/INR - ( 29 Sep 2022 18:29 )   PT: 14.2 sec;   INR: 1.19          PTT - ( 29 Sep 2022 18:29 )  PTT:24.3 sec  RADIOLOGY & ADDITIONAL STUDIES (The following images were personally reviewed):< from: Xray Chest 1 View-PORTABLE IMMEDIATE (09.29.22 @ 18:58) >    ACC: [id number] EXAM:  XR CHEST PORTABLE IMMED 1V                          PROCEDURE DATE:  09/29/2022          INTERPRETATION:  TECHNIQUE: Single portable view of the chest.    COMPARISON:  8/19/2022 and 9/9/2022    CLINICAL HISTORY: Sepsis    FINDINGS:    Single frontal view of the chest demonstrates moderate right-sided   pleural effusion. The cardiomediastinal silhouette is enlarged. No acute   osseous abnormalities. Overlying EKG leads and wires are noted    IMPRESSION: Moderate right-sided pleural effusion.    < end of copied text >

## 2022-09-30 NOTE — PROGRESS NOTE ADULT - PROBLEM SELECTOR PLAN 4
- Continue Flomax and Finasteride for comfort reasons Patient presents with pmhx of BPH    - Continue Flomax and Finasteride for comfort reasons

## 2022-09-30 NOTE — PROGRESS NOTE ADULT - PROBLEM SELECTOR PLAN 5
F: none  E: No labs  N: Comfort feds if tolerating  DVT: none  DISPO: [facility]>pending [facility] F: none  E: No labs  N: Comfort feds if tolerating  DVT: none  DISPO: [facility]>pending [facility]    DNR/DNI, MOLST in chart

## 2022-09-30 NOTE — ADVANCED PRACTICE NURSE CONSULT - RECOMMEDATIONS
REcommend Triad ointment over all pressure injuries except bilat heels (Cavilon), cover with foam dressings. Spoke with FARZANEH Weber and house staff.

## 2022-09-30 NOTE — CHART NOTE - NSCHARTNOTEFT_GEN_A_CORE
PALLIATIVE MEDICINE COORDINATION OF CARE NOTE FOR JOHN LIMON  [  ] ED Trigger   [  ] MICU Trigger     [X  ] Consult    [  ] AI Comanagement    Patient last assessed: __9/12___  to manage: GOC/AD, Symptoms, and Support was recommended:_Hospice_____    __30____ Minutes; Start: __0700am___  End: _0730am___, of non-face-to-face prolonged service provided that relates to (face-to-face) care that has or will occur and ongoing patient management, including one or more of the following:   - Reviewed records from other physicians or other health care professional services, including one or more of the following: other medical records and diagnostic / radiology study results     HPI:  89 y/o M with a PMHx of Dementia, anemia, bladder CA, CVA, heart failure, Afib on Eliquis, chronic kidney disease, BPH, recent admission for GI bleed, presents to ED from nursing home for AMS and poor PO intake. Patient says "yes" when asked if he's in pain and says "everywhere" when asked where. Unable to obtain further history due to baseline dementia.     ED Course:  T: 97.1 (rectal), , BP 93/50, RR 18, 96% on room air   Labs: Hgb 9.4, Na 151, CO2 16, AG 18, , Cr 6.40, AST/ALT 65/46, Lactate 2.3  Imaging: Xray Chest Moderate right-sided pleural effusion. CT Head: No definite acute intracranial pathology on this motion degraded examination. (29 Sep 2022 22:41)    - Other: [facility]Stop reviewed.    Rx found on iStop review. Ref #: [id number]    - Other: Medication reviewed.    The patient HAS NOT used PRN's in the last 24h.    MEDICATIONS  (STANDING):  ascorbic acid 500 milliGRAM(s) Oral daily  finasteride 5 milliGRAM(s) Oral daily  lidocaine   4% Patch 1 Patch Transdermal daily  pantoprazole    Tablet 40 milliGRAM(s) Oral before breakfast  polyethylene glycol 3350 17 Gram(s) Oral daily  QUEtiapine 25 milliGRAM(s) Oral at bedtime  senna 2 Tablet(s) Oral at bedtime  sertraline 50 milliGRAM(s) Oral daily  tamsulosin 0.4 milliGRAM(s) Oral at bedtime    MEDICATIONS  (PRN):  artificial  tears Solution 2 Drop(s) Both EYES every 1 hour PRN Dry Eyes  glycopyrrolate Injectable 0.4 milliGRAM(s) IV Push four times a day PRN Secretions  HYDROmorphone  Injectable 0.5 milliGRAM(s) IV Push every 4 hours PRN Moderate pain (4-6), Severe pain (7-10), Respiratory rate greater than 22  LORazepam   Injectable 0.5 milliGRAM(s) IV Push every 4 hours PRN Anxiety  ondansetron Injectable 4 milliGRAM(s) IV Push every 6 hours PRN Nausea and/or Vomiting    - Other: Advanced directives     DNR/DNI     MOLST form found on patient window on 9/4/2022     No documented HCP form found on patient window     No Living will / POA / Advance directives found on [place] / patient window     GOC notes on [facility] 9/29/2022      - Other: Coordination/Plan of care     __3__ admissions in 1 year     Current admission LOS: _1__ days     LACE score: _12___ ADVANCE ILLNESS PATIENT.     Patient previously seen by palliative medicine consult service.        Discussed with primary team.   Consult request for: "  Presenting from NH with AMS. Discussed GOC with daughter Celina upon pt's arrival (refer to chart note). Family wants to purse comfort measures and interested in hospice. Would benefit from going to [facility] due to multiple pressure wounds.  "    Full consult to follow within 24h.

## 2022-09-30 NOTE — CONSULT NOTE ADULT - PROBLEM SELECTOR RECOMMENDATION 5
Patient remains a DNR/DNI. Goal remains keeping patient comfortable. Please see Goals of care note for details.  As discussed during the palliative IDT meeting, the patients PSSA screening did not identify any current psychosocial need or spiritual support deficits.  - Yazidism/Spiritual practice: unknown  - Coping: [ ] well [ ] with difficulty [ ] poor coping  [x] unable to obtain  - Support system: [ ] strong [x ] adequate [ ] inadequate  - All questions answered, emotional support provided  -  primary team   - Please contact Palliative Medicine 24/7 at [phone] for any acute symptoms or further questions  - Will continue to follow with you

## 2022-10-01 NOTE — PROGRESS NOTE ADULT - PROBLEM SELECTOR PLAN 1
SADIQ on CKD. Presented for AMS and poor po intake. Cr 6.40 on admission (3.88 on 9/12). Could be prerenal 2/2 poor po intake last few days. 1L given in ED   - No further labs or workup at this time. Family wishes to purse comfort measures and possible transfer to inpatient hospice.  - palliative consult appreciated

## 2022-10-01 NOTE — PROGRESS NOTE ADULT - PROBLEM SELECTOR PLAN 5
F: none  E: No labs  N: Comfort feds if tolerating  DVT: none  DISPO: [facility]>pending [facility]    DNR/DNI, MOLST in chart

## 2022-10-01 NOTE — PROGRESS NOTE ADULT - PROBLEM SELECTOR PLAN 2
Multiple pressure ulcers per nursing assessment. Patient admits to pain everywhere.   - Dilaudid 0.5 IVP q4h   - f/u wound care consult recs  - Patient would benefit from [facility] for wound care and pain control

## 2022-10-01 NOTE — PROGRESS NOTE ADULT - SUBJECTIVE AND OBJECTIVE BOX
Patient is a 89y old  Male who presents with a chief complaint of AMS (30 Sep 2022 13:43)    INTERVAL EVENTS: NAEON    ENCOUNTERED TIME: 7:00am     SUBJECTIVE:  Patient was seen and examined at bedside. Pt resting comfortably in bed, rosenbaum in place, eyes open but not responsive to verbal stimuli, no tachypnea.     Review of systems: No fever, chills, dizziness, HA, Changes in vision, CP, dyspnea, nausea or vomiting, dysuria, changes in bowel movements, LE edema. Rest of 12 point Review of systems negative unless otherwise documented elsewhere in note.     Diet, Regular:   Supplement Feeding Modality:  Oral  Ensure Clear Cans or Servings Per Day:  3       Frequency:  Two Times a day (22 @ 08:28) [Active]      MEDICATIONS:  MEDICATIONS  (STANDING):  HYDROmorphone  Injectable 0.5 milliGRAM(s) IV Push every 6 hours    MEDICATIONS  (PRN):  artificial  tears Solution 2 Drop(s) Both EYES every 1 hour PRN Dry Eyes  glycopyrrolate Injectable 0.4 milliGRAM(s) IV Push four times a day PRN Secretions  HYDROmorphone  Injectable 0.5 milliGRAM(s) IV Push every 3 hours PRN Moderate pain (4-6), Severe pain (7-10), Respiratory rate greater than 22  LORazepam   Injectable 0.5 milliGRAM(s) IV Push every 4 hours PRN Anxiety  ondansetron Injectable 4 milliGRAM(s) IV Push every 6 hours PRN Nausea and/or Vomiting      Allergies    No Known Allergies    Intolerances        OBJECTIVE:  Vital Signs Last 24 Hrs  T(C): 36.8 (01 Oct 2022 06:20), Max: 36.9 (30 Sep 2022 23:00)  T(F): 98.3 (01 Oct 2022 06:20), Max: 98.5 (30 Sep 2022 23:00)  HR: 70 (01 Oct 2022 06:20) (68 - 77)  BP: 98/54 (01 Oct 2022 06:20) (98/54 - 115/78)  BP(mean): --  RR: 18 (01 Oct 2022 06:20) (16 - 18)  SpO2: 93% (01 Oct 2022 06:20) (93% - 95%)    Parameters below as of 30 Sep 2022 23:00  Patient On (Oxygen Delivery Method): room air      I&O's Summary      PHYSICAL EXAM:  Gen: Reclining in bed at time of exam, appears stated age  HEENT: NCAT, MMM, clear OP  Neck: supple, trachea at midline  CV: RRR, +S1/S2  Pulm: adequate respiratory effort, no increase in work of breathing  Abd: soft, NTND  Skin: warm and dry,   Ext: WWP, no LE edema  Neuro: AOx3, no gross focal neurological deficits  Psych: affect and behavior appropriate, pleasant at time of interview  :     LABS:                        9.4    9.29  )-----------( 305      ( 29 Sep 2022 18:29 )             30.2     09    153<H>  |  122<H>  |  117<H>  ----------------------------<  97  4.4   |  16<L>  |  5.99<H>    Ca    7.6<L>      29 Sep 2022 21:42    TPro  4.2<L>  /  Alb  1.6<L>  /  TBili  1.2  /  DBili  x   /  AST  57<H>  /  ALT  38  /  AlkPhos  67      LIVER FUNCTIONS - ( 29 Sep 2022 21:42 )  Alb: 1.6 g/dL / Pro: 4.2 g/dL / ALK PHOS: 67 U/L / ALT: 38 U/L / AST: 57 U/L / GGT: x           PT/INR - ( 29 Sep 2022 18:29 )   PT: 14.2 sec;   INR: 1.19          PTT - ( 29 Sep 2022 18:29 )  PTT:24.3 sec  CAPILLARY BLOOD GLUCOSE        Urinalysis Basic - ( 29 Sep 2022 20:28 )    Color: Yellow / Appearance: Clear / S.025 / pH: x  Gluc: x / Ketone: NEGATIVE  / Bili: Negative / Urobili: 0.2 E.U./dL   Blood: x / Protein: 30 mg/dL / Nitrite: NEGATIVE   Leuk Esterase: NEGATIVE / RBC: < 5 /HPF / WBC < 5 /HPF   Sq Epi: x / Non Sq Epi: 0-5 /HPF / Bacteria: Present /HPF        MICRODATA:    Culture - Urine (collected 29 Sep 2022 20:28)  Source: Clean Catch Clean Catch (Midstream)  Preliminary Report (30 Sep 2022 10:07):    No growth to date.    Culture - Blood (collected 29 Sep 2022 19:29)  Source: .Blood Blood-Peripheral  Preliminary Report (30 Sep 2022 20:01):    No growth at 1 day.    Culture - Blood (collected 29 Sep 2022 19:29)  Source: .Blood Blood-Peripheral  Preliminary Report (30 Sep 2022 20:01):    No growth at 1 day.        RADIOLOGY/OTHER STUDIES:

## 2022-10-01 NOTE — PROGRESS NOTE ADULT - PROBLEM SELECTOR PLAN 3
Per NH, pt was brought in for worsening mental status. CT head unremarkable. On exam pt was AOx1 which was his mental status upon discharge on 9/12 (hx of dementia)  - progressive decline in mental status, compared to previous admissions

## 2022-10-01 NOTE — PROGRESS NOTE ADULT - ASSESSMENT
ASSESSMENT/PLAN89 y/o male PMH TIA, restrictive CM, CHF, HTN, HLD, Afib (on Eliquis), CKD (baseline Scr 2.0), BPH, urinary retention, limited mobility, brought from [facility] for melena, symptomatic anemia c  sob, , recent aspiration pneumonitis,  UTI, SADIQ on CKD, Right pleural effusion    1. [id number] off   2. Bronchodilators:  Atrovent/ albuterol q 4 – 6 hours as needed  3. Corticosteroids: off  4. ID/Antibiotics:   5. Cardiac/HTN: optimize BP   6. GI: Rx/ prophylaxis c PPI/H2B  7. Heme: Rx/VT prophylaxis off Eliquis / GOC reviewed/   8. Aspiration precautions,   9.No intervention on R pleural effusion as per GOC     Discussed c managing team, Palliative feedback appreciated 
89 y/o M with a PMHx of Dementia, anemia, bladder CA, CVA, heart failure, Afib on Eliquis, chronic kidney disease, BPH, recent admission for GI bleed, presents to ED from nursing home for AMS and poor PO intake. Patient at baseline mental status at this time, however in worsening kidney failure. Family wishes to purse with comfort care at this time - pending transfer for [facility]. 
89 y/o M with a PMHx of Dementia, anemia, bladder CA, CVA, heart failure, Afib on Eliquis, chronic kidney disease, BPH, recent admission for GI bleed, presents to ED from nursing home for AMS and poor PO intake. Patient at baseline mental status at this time, however in worsening kidney failure. Family wishes to purse with comfort care at this time - pending transfer for [facility].

## 2022-10-02 NOTE — DISCHARGE NOTE FOR THE EXPIRED PATIENT - HOSPITAL COURSE
Called to see patient for unresponsiveness. On exam the patient did not respond to verbal or physical stimuli. Absent heart and breath sounds. Absent peripheral pulses. Pupils are fixed and dilated. Patient pronounced dead at 05:20. Dr. Block notified. Next of kin/family Celina Bass notified.

## 2022-10-03 ENCOUNTER — APPOINTMENT (OUTPATIENT)
Dept: NEPHROLOGY | Facility: CLINIC | Age: 87
End: 2022-10-03

## 2022-10-04 LAB
CULTURE RESULTS: SIGNIFICANT CHANGE UP
CULTURE RESULTS: SIGNIFICANT CHANGE UP
SPECIMEN SOURCE: SIGNIFICANT CHANGE UP
SPECIMEN SOURCE: SIGNIFICANT CHANGE UP

## 2022-10-06 DIAGNOSIS — M19.90 UNSPECIFIED OSTEOARTHRITIS, UNSPECIFIED SITE: ICD-10-CM

## 2022-10-06 DIAGNOSIS — R62.7 ADULT FAILURE TO THRIVE: ICD-10-CM

## 2022-10-06 DIAGNOSIS — S71.011A LACERATION WITHOUT FOREIGN BODY, RIGHT HIP, INITIAL ENCOUNTER: ICD-10-CM

## 2022-10-06 DIAGNOSIS — Z79.899 OTHER LONG TERM (CURRENT) DRUG THERAPY: ICD-10-CM

## 2022-10-06 DIAGNOSIS — I42.5 OTHER RESTRICTIVE CARDIOMYOPATHY: ICD-10-CM

## 2022-10-06 DIAGNOSIS — I13.0 HYPERTENSIVE HEART AND CHRONIC KIDNEY DISEASE WITH HEART FAILURE AND STAGE 1 THROUGH STAGE 4 CHRONIC KIDNEY DISEASE, OR UNSPECIFIED CHRONIC KIDNEY DISEASE: ICD-10-CM

## 2022-10-06 DIAGNOSIS — I48.20 CHRONIC ATRIAL FIBRILLATION, UNSPECIFIED: ICD-10-CM

## 2022-10-06 DIAGNOSIS — L89.620 PRESSURE ULCER OF LEFT HEEL, UNSTAGEABLE: ICD-10-CM

## 2022-10-06 DIAGNOSIS — Z79.01 LONG TERM (CURRENT) USE OF ANTICOAGULANTS: ICD-10-CM
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
## 2022-10-06 DIAGNOSIS — Z51.5 ENCOUNTER FOR PALLIATIVE CARE: ICD-10-CM

## 2022-10-06 DIAGNOSIS — N18.9 CHRONIC KIDNEY DISEASE, UNSPECIFIED: ICD-10-CM

## 2022-10-06 DIAGNOSIS — L89.893 PRESSURE ULCER OF OTHER SITE, STAGE 3: ICD-10-CM

## 2022-10-06 DIAGNOSIS — I73.9 PERIPHERAL VASCULAR DISEASE, UNSPECIFIED: ICD-10-CM

## 2022-10-06 DIAGNOSIS — R53.2 FUNCTIONAL QUADRIPLEGIA: ICD-10-CM

## 2022-10-06 DIAGNOSIS — E78.5 HYPERLIPIDEMIA, UNSPECIFIED: ICD-10-CM

## 2022-10-06 DIAGNOSIS — N40.1 BENIGN PROSTATIC HYPERPLASIA WITH LOWER URINARY TRACT SYMPTOMS: ICD-10-CM

## 2022-10-06 DIAGNOSIS — L89.896 PRESSURE-INDUCED DEEP TISSUE DAMAGE OF OTHER SITE: ICD-10-CM
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
## 2022-10-06 DIAGNOSIS — F03.90 UNSPECIFIED DEMENTIA WITHOUT BEHAVIORAL DISTURBANCE: ICD-10-CM

## 2022-10-06 DIAGNOSIS — L89.150 PRESSURE ULCER OF SACRAL REGION, UNSTAGEABLE: ICD-10-CM

## 2022-10-06 DIAGNOSIS — L89.320 PRESSURE ULCER OF LEFT BUTTOCK, UNSTAGEABLE: ICD-10-CM

## 2022-10-06 DIAGNOSIS — R33.8 OTHER RETENTION OF URINE: ICD-10-CM

## 2022-10-06 DIAGNOSIS — I50.9 HEART FAILURE, UNSPECIFIED: ICD-10-CM

## 2022-10-06 DIAGNOSIS — Z66 DO NOT RESUSCITATE: ICD-10-CM

## 2022-10-06 DIAGNOSIS — E86.0 DEHYDRATION: ICD-10-CM

## 2022-10-06 DIAGNOSIS — Z20.822 CONTACT WITH AND (SUSPECTED) EXPOSURE TO COVID-19: ICD-10-CM

## 2022-10-06 DIAGNOSIS — L89.610 PRESSURE ULCER OF RIGHT HEEL, UNSTAGEABLE: ICD-10-CM

## 2022-10-06 DIAGNOSIS — N17.9 ACUTE KIDNEY FAILURE, UNSPECIFIED: ICD-10-CM

## 2022-10-06 DIAGNOSIS — R64 CACHEXIA: ICD-10-CM

## 2022-10-06 DIAGNOSIS — Z85.51 PERSONAL HISTORY OF MALIGNANT NEOPLASM OF BLADDER: ICD-10-CM

## 2022-10-20 PROCEDURE — 36415 COLL VENOUS BLD VENIPUNCTURE: CPT

## 2022-10-20 PROCEDURE — 80053 COMPREHEN METABOLIC PANEL: CPT

## 2022-10-20 PROCEDURE — 96375 TX/PRO/DX INJ NEW DRUG ADDON: CPT

## 2022-10-20 PROCEDURE — 84132 ASSAY OF SERUM POTASSIUM: CPT

## 2022-10-20 PROCEDURE — 71045 X-RAY EXAM CHEST 1 VIEW: CPT

## 2022-10-20 PROCEDURE — 99285 EMERGENCY DEPT VISIT HI MDM: CPT | Mod: 25

## 2022-10-20 PROCEDURE — 84295 ASSAY OF SERUM SODIUM: CPT

## 2022-10-20 PROCEDURE — 82330 ASSAY OF CALCIUM: CPT

## 2022-10-20 PROCEDURE — 87040 BLOOD CULTURE FOR BACTERIA: CPT

## 2022-10-20 PROCEDURE — 81001 URINALYSIS AUTO W/SCOPE: CPT

## 2022-10-20 PROCEDURE — 82803 BLOOD GASES ANY COMBINATION: CPT

## 2022-10-20 PROCEDURE — 83605 ASSAY OF LACTIC ACID: CPT

## 2022-10-20 PROCEDURE — 85610 PROTHROMBIN TIME: CPT

## 2022-10-20 PROCEDURE — 82962 GLUCOSE BLOOD TEST: CPT

## 2022-10-20 PROCEDURE — 85730 THROMBOPLASTIN TIME PARTIAL: CPT

## 2022-10-20 PROCEDURE — 96365 THER/PROPH/DIAG IV INF INIT: CPT

## 2022-10-20 PROCEDURE — 87086 URINE CULTURE/COLONY COUNT: CPT

## 2022-10-20 PROCEDURE — 87635 SARS-COV-2 COVID-19 AMP PRB: CPT

## 2022-10-20 PROCEDURE — 70450 CT HEAD/BRAIN W/O DYE: CPT | Mod: MA

## 2022-10-20 PROCEDURE — 85025 COMPLETE CBC W/AUTO DIFF WBC: CPT

## 2023-04-27 NOTE — CONSULT NOTE ADULT - ASSESSMENT
Pt arrives ambulatory, new onset afib last week. Had ECG done today, cardiologist wanted her sent to ED due to changes in ECG. +chest pressure. A&O x4. 89 year old man with Debility, GIB, Heart failure, Dementia and encounter for palliative care.

## 2024-12-17 NOTE — ED PROVIDER NOTE - COVID-19 ORDERING FACILITY
Pt sent [facility] message asking for the following refills, however Vitamin D and Vitamin B12 are not on pt's active medication list.  Please review and advise:        5/21/24 10:12 AM  I do need refills for both Vit D and B12. I also need metoprolol, famotadine, esomeprazole, montelukast and zofran called in as well.  
[facility]
5 (moderate pain)

## 2025-06-04 NOTE — ED ADULT NURSE REASSESSMENT NOTE - COMFORT CARE
- Take two of the 1mg tablets of Prandin to give 2mg before each meal      
repositioned/warm blanket provided